# Patient Record
Sex: MALE | Race: WHITE | NOT HISPANIC OR LATINO
[De-identification: names, ages, dates, MRNs, and addresses within clinical notes are randomized per-mention and may not be internally consistent; named-entity substitution may affect disease eponyms.]

---

## 2017-02-13 ENCOUNTER — APPOINTMENT (OUTPATIENT)
Dept: ORTHOPEDIC SURGERY | Facility: CLINIC | Age: 52
End: 2017-02-13

## 2017-02-13 VITALS
SYSTOLIC BLOOD PRESSURE: 125 MMHG | WEIGHT: 190 LBS | DIASTOLIC BLOOD PRESSURE: 74 MMHG | BODY MASS INDEX: 25.73 KG/M2 | HEART RATE: 76 BPM | HEIGHT: 72 IN

## 2017-03-13 ENCOUNTER — APPOINTMENT (OUTPATIENT)
Dept: ORTHOPEDIC SURGERY | Facility: CLINIC | Age: 52
End: 2017-03-13

## 2017-07-10 ENCOUNTER — APPOINTMENT (OUTPATIENT)
Dept: ORTHOPEDIC SURGERY | Facility: CLINIC | Age: 52
End: 2017-07-10

## 2017-07-18 ENCOUNTER — FORM ENCOUNTER (OUTPATIENT)
Age: 52
End: 2017-07-18

## 2017-07-19 ENCOUNTER — APPOINTMENT (OUTPATIENT)
Dept: RADIOLOGY | Facility: HOSPITAL | Age: 52
End: 2017-07-19

## 2017-07-19 ENCOUNTER — OUTPATIENT (OUTPATIENT)
Dept: OUTPATIENT SERVICES | Facility: HOSPITAL | Age: 52
LOS: 1 days | End: 2017-07-19
Payer: COMMERCIAL

## 2017-07-19 DIAGNOSIS — M51.24 OTHER INTERVERTEBRAL DISC DISPLACEMENT, THORACIC REGION: ICD-10-CM

## 2017-07-19 DIAGNOSIS — M54.12 RADICULOPATHY, CERVICAL REGION: ICD-10-CM

## 2017-07-19 PROCEDURE — 62305 MYELOGRAPHY LUMBAR INJECTION: CPT

## 2017-07-19 PROCEDURE — 72129 CT CHEST SPINE W/DYE: CPT

## 2017-07-19 PROCEDURE — 72126 CT NECK SPINE W/DYE: CPT

## 2017-07-21 ENCOUNTER — TRANSCRIPTION ENCOUNTER (OUTPATIENT)
Age: 52
End: 2017-07-21

## 2017-07-24 ENCOUNTER — APPOINTMENT (OUTPATIENT)
Dept: ORTHOPEDIC SURGERY | Facility: CLINIC | Age: 52
End: 2017-07-24

## 2017-07-24 DIAGNOSIS — M51.24 OTHER INTERVERTEBRAL DISC DISPLACEMENT, THORACIC REGION: ICD-10-CM

## 2017-07-24 DIAGNOSIS — M50.20 OTHER CERVICAL DISC DISPLACEMENT, UNSPECIFIED CERVICAL REGION: ICD-10-CM

## 2017-11-06 ENCOUNTER — APPOINTMENT (OUTPATIENT)
Dept: ORTHOPEDIC SURGERY | Facility: CLINIC | Age: 52
End: 2017-11-06
Payer: COMMERCIAL

## 2017-11-06 DIAGNOSIS — M47.812 SPONDYLOSIS W/OUT MYELOPATHY OR RADICULOPATHY, CERVICAL REGION: ICD-10-CM

## 2017-11-06 DIAGNOSIS — M47.816 SPONDYLOSIS W/OUT MYELOPATHY OR RADICULOPATHY, CERVICAL REGION: ICD-10-CM

## 2017-11-06 PROCEDURE — 99214 OFFICE O/P EST MOD 30 MIN: CPT

## 2018-07-27 PROBLEM — M47.812 DEGENERATIVE JOINT DISEASE OF CERVICAL AND LUMBAR SPINE: Status: ACTIVE | Noted: 2017-02-13

## 2019-06-27 ENCOUNTER — APPOINTMENT (OUTPATIENT)
Dept: ORTHOPEDIC SURGERY | Facility: CLINIC | Age: 54
End: 2019-06-27
Payer: COMMERCIAL

## 2019-06-27 PROCEDURE — 99215 OFFICE O/P EST HI 40 MIN: CPT

## 2019-06-27 PROCEDURE — 72100 X-RAY EXAM L-S SPINE 2/3 VWS: CPT

## 2019-08-28 ENCOUNTER — APPOINTMENT (OUTPATIENT)
Dept: NEUROSURGERY | Facility: CLINIC | Age: 54
End: 2019-08-28
Payer: COMMERCIAL

## 2019-08-28 PROCEDURE — 99203 OFFICE O/P NEW LOW 30 MIN: CPT

## 2019-08-30 NOTE — CONSULT LETTER
[Dear  ___] : Dear  [unfilled], [Courtesy Letter:] : I had the pleasure of seeing your patient, [unfilled], in my office today. [Sincerely,] : Sincerely, [FreeTextEntry2] : John Francis\par [facility] [address]\[place] [address] [FreeTextEntry1] : Mr. Samm Marie is a 54-year-old male who has a long-standing history of chronic back pain. Patient was recently diagnosed with multiple myeloma in May of 2019. Patient states his back pain started shortly around this time after performing home improvements. Patient also states he is currently on immunotherapy medications. He has noticed that his pain becomes almost unbearable shortly after receiving his immunotherapy.  Patient reports a constant 10/10, at its worst, sharp and shooting lower back pain, right greater then left. He experiences mid back stiffness. Patient does not experience any radiating symptoms down his legs. He denies any numbness, tingling, or weakness to his legs. He denies any mechanical back pain. Patient does have difficulties with balance. He does state that pain worsens with activity. Lying down provides him with some relief. Cyclobenzaprine, tramadol, and gabapentin provide him with some relief.  Patient underwent epidural injections July 3, 2019 and July 24, 2019. He is not sure that this has helped his symptoms. Patient has also undergone a few sessions of acupuncture in August of 2019 with no relief noted.\par \par Patient has MRIs of the cervical, thoracic, and lumbar spine performed at [facility] on June 11, 2019 which indicate degenerative changes and disc bulges throughout the spine.\par \par Patient is alert. No distress noted. Strength of bilateral legs 5/5. Reflexes to bilateral lower extremities equal and normal. Sensation to light touch to bilateral legs symmetric and normal. Negative clonus. Patient is walking without difficulties.\par \par The patient's symptoms do not correlate with the MRI findings at this time. It is possible that his symptoms may be exacerbated due to the immunotherapy medications. I provided patient with a prescription for physical therapy for core strengthening exercises. Dr. Beverly was in to speak to the patient to discuss findings. Patient is aware to call our office with any new or worsening symptoms.\par  [FreeTextEntry3] : Jaqueline Bragg, MSN, FNP-BC\par Nurse Practitioner\par Department of Neurosurgery\par \par [facility]\par [address] [place] [address] [address],\[place] [address]\par \par Office: [phone]\par Fax: [phone]\par

## 2020-01-06 ENCOUNTER — RX RENEWAL (OUTPATIENT)
Age: 55
End: 2020-01-06

## 2020-01-22 ENCOUNTER — APPOINTMENT (OUTPATIENT)
Dept: NEUROSURGERY | Facility: CLINIC | Age: 55
End: 2020-01-22

## 2020-02-26 ENCOUNTER — APPOINTMENT (OUTPATIENT)
Dept: NEUROSURGERY | Facility: CLINIC | Age: 55
End: 2020-02-26

## 2020-05-21 ENCOUNTER — RX RENEWAL (OUTPATIENT)
Age: 55
End: 2020-05-21

## 2021-07-16 ENCOUNTER — TRANSCRIPTION ENCOUNTER (OUTPATIENT)
Age: 56
End: 2021-07-16

## 2021-07-17 ENCOUNTER — EMERGENCY (EMERGENCY)
Facility: HOSPITAL | Age: 56
LOS: 0 days | Discharge: HOME | End: 2021-07-17
Attending: STUDENT IN AN ORGANIZED HEALTH CARE EDUCATION/TRAINING PROGRAM
Payer: COMMERCIAL

## 2021-07-17 VITALS
WEIGHT: 192.02 LBS | OXYGEN SATURATION: 98 % | HEIGHT: 71 IN | HEART RATE: 62 BPM | DIASTOLIC BLOOD PRESSURE: 67 MMHG | RESPIRATION RATE: 14 BRPM | TEMPERATURE: 99 F | SYSTOLIC BLOOD PRESSURE: 118 MMHG

## 2021-07-17 VITALS
RESPIRATION RATE: 16 BRPM | DIASTOLIC BLOOD PRESSURE: 76 MMHG | HEART RATE: 60 BPM | SYSTOLIC BLOOD PRESSURE: 124 MMHG | OXYGEN SATURATION: 99 %

## 2021-07-17 DIAGNOSIS — R42 DIZZINESS AND GIDDINESS: ICD-10-CM

## 2021-07-17 DIAGNOSIS — R06.02 SHORTNESS OF BREATH: ICD-10-CM

## 2021-07-17 DIAGNOSIS — Z87.39 PERSONAL HISTORY OF OTHER DISEASES OF THE MUSCULOSKELETAL SYSTEM AND CONNECTIVE TISSUE: ICD-10-CM

## 2021-07-17 DIAGNOSIS — Z87.891 PERSONAL HISTORY OF NICOTINE DEPENDENCE: ICD-10-CM

## 2021-07-17 DIAGNOSIS — R07.89 OTHER CHEST PAIN: ICD-10-CM

## 2021-07-17 DIAGNOSIS — R06.00 DYSPNEA, UNSPECIFIED: ICD-10-CM

## 2021-07-17 LAB
ALBUMIN SERPL ELPH-MCNC: 4 G/DL — SIGNIFICANT CHANGE UP (ref 3.3–5)
ALP SERPL-CCNC: 83 U/L — SIGNIFICANT CHANGE UP (ref 40–120)
ALT FLD-CCNC: 32 U/L — SIGNIFICANT CHANGE UP (ref 12–78)
ANION GAP SERPL CALC-SCNC: 6 MMOL/L — SIGNIFICANT CHANGE UP (ref 5–17)
AST SERPL-CCNC: 29 U/L — SIGNIFICANT CHANGE UP (ref 15–37)
BASOPHILS # BLD AUTO: 0 K/UL — SIGNIFICANT CHANGE UP (ref 0–0.2)
BASOPHILS NFR BLD AUTO: 0 % — SIGNIFICANT CHANGE UP (ref 0–2)
BILIRUB SERPL-MCNC: 1.4 MG/DL — HIGH (ref 0.2–1.2)
BUN SERPL-MCNC: 15 MG/DL — SIGNIFICANT CHANGE UP (ref 7–23)
CALCIUM SERPL-MCNC: 8.7 MG/DL — SIGNIFICANT CHANGE UP (ref 8.5–10.1)
CHLORIDE SERPL-SCNC: 108 MMOL/L — SIGNIFICANT CHANGE UP (ref 96–108)
CO2 SERPL-SCNC: 27 MMOL/L — SIGNIFICANT CHANGE UP (ref 22–31)
CREAT SERPL-MCNC: 0.92 MG/DL — SIGNIFICANT CHANGE UP (ref 0.5–1.3)
D DIMER BLD IA.RAPID-MCNC: <150 NG/ML DDU — SIGNIFICANT CHANGE UP
EOSINOPHIL # BLD AUTO: 0 K/UL — SIGNIFICANT CHANGE UP (ref 0–0.5)
EOSINOPHIL NFR BLD AUTO: 0 % — SIGNIFICANT CHANGE UP (ref 0–6)
GLUCOSE SERPL-MCNC: 92 MG/DL — SIGNIFICANT CHANGE UP (ref 70–99)
HCT VFR BLD CALC: 40.2 % — SIGNIFICANT CHANGE UP (ref 39–50)
HGB BLD-MCNC: 13.5 G/DL — SIGNIFICANT CHANGE UP (ref 13–17)
IMM GRANULOCYTES NFR BLD AUTO: 0.4 % — SIGNIFICANT CHANGE UP (ref 0–1.5)
LYMPHOCYTES # BLD AUTO: 0.64 K/UL — LOW (ref 1–3.3)
LYMPHOCYTES # BLD AUTO: 12.2 % — LOW (ref 13–44)
MAGNESIUM SERPL-MCNC: 2.4 MG/DL — SIGNIFICANT CHANGE UP (ref 1.6–2.6)
MCHC RBC-ENTMCNC: 33.6 GM/DL — SIGNIFICANT CHANGE UP (ref 32–36)
MCHC RBC-ENTMCNC: 35.2 PG — HIGH (ref 27–34)
MCV RBC AUTO: 104.7 FL — HIGH (ref 80–100)
MONOCYTES # BLD AUTO: 0.87 K/UL — SIGNIFICANT CHANGE UP (ref 0–0.9)
MONOCYTES NFR BLD AUTO: 16.6 % — HIGH (ref 2–14)
NEUTROPHILS # BLD AUTO: 3.71 K/UL — SIGNIFICANT CHANGE UP (ref 1.8–7.4)
NEUTROPHILS NFR BLD AUTO: 70.8 % — SIGNIFICANT CHANGE UP (ref 43–77)
NT-PROBNP SERPL-SCNC: 69 PG/ML — SIGNIFICANT CHANGE UP (ref 0–125)
PLATELET # BLD AUTO: 116 K/UL — LOW (ref 150–400)
POTASSIUM SERPL-MCNC: 4.5 MMOL/L — SIGNIFICANT CHANGE UP (ref 3.5–5.3)
POTASSIUM SERPL-SCNC: 4.5 MMOL/L — SIGNIFICANT CHANGE UP (ref 3.5–5.3)
PROT SERPL-MCNC: 6.4 GM/DL — SIGNIFICANT CHANGE UP (ref 6–8.3)
RBC # BLD: 3.84 M/UL — LOW (ref 4.2–5.8)
RBC # FLD: 12 % — SIGNIFICANT CHANGE UP (ref 10.3–14.5)
SARS-COV-2 RNA SPEC QL NAA+PROBE: SIGNIFICANT CHANGE UP
SODIUM SERPL-SCNC: 141 MMOL/L — SIGNIFICANT CHANGE UP (ref 135–145)
TROPONIN I SERPL-MCNC: <0.015 NG/ML — SIGNIFICANT CHANGE UP (ref 0.01–0.04)
WBC # BLD: 5.24 K/UL — SIGNIFICANT CHANGE UP (ref 3.8–10.5)
WBC # FLD AUTO: 5.24 K/UL — SIGNIFICANT CHANGE UP (ref 3.8–10.5)

## 2021-07-17 PROCEDURE — 83735 ASSAY OF MAGNESIUM: CPT

## 2021-07-17 PROCEDURE — 93010 ELECTROCARDIOGRAM REPORT: CPT

## 2021-07-17 PROCEDURE — 36415 COLL VENOUS BLD VENIPUNCTURE: CPT

## 2021-07-17 PROCEDURE — U0005: CPT

## 2021-07-17 PROCEDURE — 71275 CT ANGIOGRAPHY CHEST: CPT | Mod: 26,MA

## 2021-07-17 PROCEDURE — U0003: CPT

## 2021-07-17 PROCEDURE — 71275 CT ANGIOGRAPHY CHEST: CPT

## 2021-07-17 PROCEDURE — 93970 EXTREMITY STUDY: CPT

## 2021-07-17 PROCEDURE — 99285 EMERGENCY DEPT VISIT HI MDM: CPT

## 2021-07-17 PROCEDURE — 93005 ELECTROCARDIOGRAM TRACING: CPT

## 2021-07-17 PROCEDURE — 99284 EMERGENCY DEPT VISIT MOD MDM: CPT | Mod: 25

## 2021-07-17 PROCEDURE — 80053 COMPREHEN METABOLIC PANEL: CPT

## 2021-07-17 PROCEDURE — 85025 COMPLETE CBC W/AUTO DIFF WBC: CPT

## 2021-07-17 PROCEDURE — 85379 FIBRIN DEGRADATION QUANT: CPT

## 2021-07-17 PROCEDURE — 71045 X-RAY EXAM CHEST 1 VIEW: CPT | Mod: 26

## 2021-07-17 PROCEDURE — 71045 X-RAY EXAM CHEST 1 VIEW: CPT

## 2021-07-17 PROCEDURE — 93970 EXTREMITY STUDY: CPT | Mod: 26

## 2021-07-17 PROCEDURE — 84484 ASSAY OF TROPONIN QUANT: CPT

## 2021-07-17 PROCEDURE — 83880 ASSAY OF NATRIURETIC PEPTIDE: CPT

## 2021-07-17 NOTE — ED PROVIDER NOTE - NS ED ROS FT
Constitutional: No fever.  Neurological: No headache.  Eyes: No vision changes.   Ears, Nose, Mouth, Throat: No congestion.  Cardiovascular: (+) chest tightness/ heaviness   Respiratory: (+) SOB   Gastrointestinal: No nausea or vomiting.  Genitourinary: No dysuria.  Musculoskeletal: No joint pain.  Integumentary (skin and/or breast): No rash.

## 2021-07-17 NOTE — ED PROVIDER NOTE - PHYSICAL EXAMINATION
Vital signs as available reviewed.  General:  Comfortable, no acute distress.  Head:  Normocephalic, atraumatic.  Eyes:  Conjunctiva pink, no icterus.  Cardiovascular:  Regular rate, (+) soft systolic murmur   Respiratory:  Clear to auscultation, good air entry bilaterally.  Abdomen:  Soft, non-tender.  Musculoskeletal:  No deformity or calf tenderness.  Neurologic: Alert and oriented, moving all extremities.  Skin:  Warm and dry.

## 2021-07-17 NOTE — ED ADULT TRIAGE NOTE - CHIEF COMPLAINT QUOTE
Pt comes to the ED complaining of shortness of breath, intermittent lightheadedness, near syncope. Pt was seen at [facility] yesterday and was recommended to come to the ED to r/o PE and be evaluated for MI. Pt refused at the time. Pt comes to the ED today for evaluation.

## 2021-07-17 NOTE — ED PROVIDER NOTE - CLINICAL SUMMARY MEDICAL DECISION MAKING FREE TEXT BOX
here with SOB and chest pressure. and lightheaded. former smoker. hx of cancer. r/o acs and pe. CTA chest. check labs. reassess.

## 2021-07-17 NOTE — ED ADULT NURSE NOTE - NS_ED_NURSE_TEACHING_TOPIC_ED_A_ED
Pt. DC as ordered. No acute or physical distress noted at this time - Pt. and family verbalizes understanding of DC, FU and Worsening of symptoms to return to ED/Respiratory

## 2021-07-17 NOTE — ED PROVIDER NOTE - NSFOLLOWUPINSTRUCTIONS_ED_ALL_ED_FT
Please follow up with your Primary Care Physician / cardiologist as soon as possible.  Please take your medications as prescribed.  If your symptoms persist or worsen, please seek care. Either return to the Emergency Department, go to urgent care or see your primary care doctor.   ====================================================================     Shortness of Breath    WHAT YOU NEED TO KNOW:    Shortness of breath is a feeling that you cannot get enough air when you breathe in. You may have this feeling only during activity, or all the time. Your symptoms can range from mild to severe. Shortness of breath may be a sign of a serious health condition that needs immediate care.    DISCHARGE INSTRUCTIONS:    Return to the emergency department if:     Your signs and symptoms are the same or worse within 24 hours of treatment.       The skin over your ribs or on your neck sinks in when you breathe.       You feel confused or dizzy.    Contact your healthcare provider if:     You have new or worsening symptoms.      You have questions or concerns about your condition or care.    Medicines:     Medicines may be used to treat the cause of your symptoms. You may need medicine to treat a bacterial infection or reduce anxiety. Other medicines may be used to open your airway, reduce swelling, or remove extra fluid. If you have a heart condition, you may need medicine to help your heart beat more strongly or regularly.      Take your medicine as directed. Contact your healthcare provider if you think your medicine is not helping or if you have side effects. Tell him or her if you are allergic to any medicine. Keep a list of the medicines, vitamins, and herbs you take. Include the amounts, and when and why you take them. Bring the list or the pill bottles to follow-up visits. Carry your medicine list with you in case of an emergency.    Manage shortness of breath:     Create an action plan. You and your healthcare provider can work together to create a plan for how to handle shortness of breath. The plan can include daily activities, treatment changes, and what to do if you have severe breathing problems.      Lean forward on your elbows when you sit. This helps your lungs expand and may make it easier to breathe.      Use pursed-lip breathing any time you feel short of breath. Breathe in through your nose and then slowly breathe out through your mouth with your lips slightly puckered. It should take you twice as long to breathe out as it did to breathe in.Breathe in Breathe out           Do not smoke. Nicotine and other chemicals in cigarettes and cigars can cause lung damage and make shortness of breath worse. Ask your healthcare provider for information if you currently smoke and need help to quit. E-cigarettes or smokeless tobacco still contain nicotine. Talk to your healthcare provider before you use these products.      Reach or maintain a healthy weight. Your healthcare provider can help you create a safe weight loss plan if you are overweight.      Exercise as directed. Exercise can help your lungs work more easily. Exercise can also help you lose weight if needed. Try to get at least 30 minutes of exercise most days of the week.    Follow up with your healthcare provider or specialist as directed: Write down your questions so you remember to ask them during your visits.

## 2021-07-17 NOTE — ED ADULT NURSE NOTE - CHIEF COMPLAINT QUOTE
Pt comes to the ED complaining of shortness of breath, intermittent lightheadedness, near syncope. Pt was seen at [facility] yesterday and was recommended to come to the ED to r/o PE and be evaluated for MI. Pt refused at the time. Pt comes to the ED today for evaluation.

## 2021-07-17 NOTE — ED PROVIDER NOTE - PROGRESS NOTE DETAILS
KV: patient offered admission for further work up of his symptoms, he refuses, states he will follow up with his outpatient team. plan for discharge if work up negative. Clifford

## 2021-07-17 NOTE — ED PROVIDER NOTE - PATIENT PORTAL LINK FT
You can access the FollowMyHealth Patient Portal offered by [facility] by registering at the following website: [email or web address]. By joining [facility]’s FollowMyHealth portal, you will also be able to view your health information using other applications (apps) compatible with our system.

## 2021-07-17 NOTE — ED PROVIDER NOTE - CARE PLAN
Principal Discharge DX:	Dyspnea  Secondary Diagnosis:	Dizziness  Secondary Diagnosis:	Chest pain  
no

## 2021-07-17 NOTE — ED ADULT NURSE REASSESSMENT NOTE - NS ED NURSE REASSESS COMMENT FT1
Menu provided and lunch ordered for pt. Pt resting comfortably with call bell within reach.
Plan of care reviewed with pt and girlfriend at bedside. MD made aware of pt wanting to be discharged.
Pt returned from CT scan. Pillow and call bell provided for comfort. Plan of care reviewed with pt and girlfriend at bedside.

## 2021-07-17 NOTE — ED PROVIDER NOTE - OBJECTIVE STATEMENT
55 y/o M with a PMHx of disc degeneration, cervical radiculopathy, mitral valve regurgitation, myeloma presents to the ED ambulatory c/o intermittent SOB x couple days. Reports waking up this morning feeling very SOB. Notes exertion worsens SOB, but also pt reports SOB will have a sudden onset even when he is at rest. Pt also states he feels lightheaded and has chest tightness/heaviness; no CP. Reports he "feels like I will faint" but denies any syncopal episodes. Reports shin/feet pain. Went to an urgent care to receive a COVID test yesterday, and was suggested to come in to the ED to r/o PE. Takes 1 ASA per day. Denies hx of MI. Former smoker, reports at he used to spoke 1.5 ppd. Drinks socially. No drug use. PCP: Dr. John Francis. Cardio: Dr. Rahul Alvarenga. Oncologist: Dr. Mian Chapin

## 2021-07-18 ENCOUNTER — TRANSCRIPTION ENCOUNTER (OUTPATIENT)
Age: 56
End: 2021-07-18

## 2022-02-06 ENCOUNTER — INPATIENT (INPATIENT)
Facility: HOSPITAL | Age: 57
LOS: 2 days | Discharge: HOME | DRG: 194 | End: 2022-02-09
Attending: INTERNAL MEDICINE | Admitting: INTERNAL MEDICINE
Payer: COMMERCIAL

## 2022-02-06 VITALS — WEIGHT: 188.94 LBS | HEIGHT: 71 IN

## 2022-02-06 DIAGNOSIS — J18.9 PNEUMONIA, UNSPECIFIED ORGANISM: ICD-10-CM

## 2022-02-06 PROBLEM — C90.00 MULTIPLE MYELOMA NOT HAVING ACHIEVED REMISSION: Chronic | Status: ACTIVE | Noted: 2021-07-17

## 2022-02-06 PROBLEM — I34.0 NONRHEUMATIC MITRAL (VALVE) INSUFFICIENCY: Chronic | Status: ACTIVE | Noted: 2021-07-17

## 2022-02-06 LAB
ADD ON TEST-SPECIMEN IN LAB: SIGNIFICANT CHANGE UP
ALBUMIN SERPL ELPH-MCNC: 3.1 G/DL — LOW (ref 3.3–5)
ALP SERPL-CCNC: 93 U/L — SIGNIFICANT CHANGE UP (ref 40–120)
ALT FLD-CCNC: 24 U/L — SIGNIFICANT CHANGE UP (ref 12–78)
ANION GAP SERPL CALC-SCNC: 5 MMOL/L — SIGNIFICANT CHANGE UP (ref 5–17)
APTT BLD: 30.8 SEC — SIGNIFICANT CHANGE UP (ref 27.5–35.5)
AST SERPL-CCNC: 14 U/L — LOW (ref 15–37)
BASOPHILS # BLD AUTO: 0.01 K/UL — SIGNIFICANT CHANGE UP (ref 0–0.2)
BASOPHILS NFR BLD AUTO: 0.1 % — SIGNIFICANT CHANGE UP (ref 0–2)
BILIRUB SERPL-MCNC: 1.7 MG/DL — HIGH (ref 0.2–1.2)
BUN SERPL-MCNC: 15 MG/DL — SIGNIFICANT CHANGE UP (ref 7–23)
CALCIUM SERPL-MCNC: 8.9 MG/DL — SIGNIFICANT CHANGE UP (ref 8.5–10.1)
CHLORIDE SERPL-SCNC: 108 MMOL/L — SIGNIFICANT CHANGE UP (ref 96–108)
CO2 SERPL-SCNC: 27 MMOL/L — SIGNIFICANT CHANGE UP (ref 22–31)
CREAT SERPL-MCNC: 1.06 MG/DL — SIGNIFICANT CHANGE UP (ref 0.5–1.3)
D DIMER BLD IA.RAPID-MCNC: 681 NG/ML DDU — HIGH
EOSINOPHIL # BLD AUTO: 0.05 K/UL — SIGNIFICANT CHANGE UP (ref 0–0.5)
EOSINOPHIL NFR BLD AUTO: 0.5 % — SIGNIFICANT CHANGE UP (ref 0–6)
GLUCOSE SERPL-MCNC: 104 MG/DL — HIGH (ref 70–99)
HCT VFR BLD CALC: 39.6 % — SIGNIFICANT CHANGE UP (ref 39–50)
HGB BLD-MCNC: 13.2 G/DL — SIGNIFICANT CHANGE UP (ref 13–17)
IMM GRANULOCYTES NFR BLD AUTO: 0.2 % — SIGNIFICANT CHANGE UP (ref 0–1.5)
INR BLD: 1.19 RATIO — HIGH (ref 0.88–1.16)
LACTATE SERPL-SCNC: 1.1 MMOL/L — SIGNIFICANT CHANGE UP (ref 0.7–2)
LYMPHOCYTES # BLD AUTO: 0.9 K/UL — LOW (ref 1–3.3)
LYMPHOCYTES # BLD AUTO: 9.4 % — LOW (ref 13–44)
MAGNESIUM SERPL-MCNC: 2.4 MG/DL — SIGNIFICANT CHANGE UP (ref 1.6–2.6)
MCHC RBC-ENTMCNC: 33.2 PG — SIGNIFICANT CHANGE UP (ref 27–34)
MCHC RBC-ENTMCNC: 33.3 GM/DL — SIGNIFICANT CHANGE UP (ref 32–36)
MCV RBC AUTO: 99.7 FL — SIGNIFICANT CHANGE UP (ref 80–100)
MONOCYTES # BLD AUTO: 1.12 K/UL — HIGH (ref 0–0.9)
MONOCYTES NFR BLD AUTO: 11.6 % — SIGNIFICANT CHANGE UP (ref 2–14)
NEUTROPHILS # BLD AUTO: 7.52 K/UL — HIGH (ref 1.8–7.4)
NEUTROPHILS NFR BLD AUTO: 78.2 % — HIGH (ref 43–77)
NT-PROBNP SERPL-SCNC: 751 PG/ML — HIGH (ref 0–125)
PLATELET # BLD AUTO: 148 K/UL — LOW (ref 150–400)
POTASSIUM SERPL-MCNC: 4.1 MMOL/L — SIGNIFICANT CHANGE UP (ref 3.5–5.3)
POTASSIUM SERPL-SCNC: 4.1 MMOL/L — SIGNIFICANT CHANGE UP (ref 3.5–5.3)
PROT SERPL-MCNC: 6.7 GM/DL — SIGNIFICANT CHANGE UP (ref 6–8.3)
PROTHROM AB SERPL-ACNC: 13.8 SEC — HIGH (ref 10.6–13.6)
RBC # BLD: 3.97 M/UL — LOW (ref 4.2–5.8)
RBC # FLD: 12.1 % — SIGNIFICANT CHANGE UP (ref 10.3–14.5)
SODIUM SERPL-SCNC: 140 MMOL/L — SIGNIFICANT CHANGE UP (ref 135–145)
TROPONIN I, HIGH SENSITIVITY RESULT: 5.9 NG/L — SIGNIFICANT CHANGE UP
TROPONIN I, HIGH SENSITIVITY RESULT: 6.27 NG/L — SIGNIFICANT CHANGE UP
WBC # BLD: 9.62 K/UL — SIGNIFICANT CHANGE UP (ref 3.8–10.5)
WBC # FLD AUTO: 9.62 K/UL — SIGNIFICANT CHANGE UP (ref 3.8–10.5)

## 2022-02-06 PROCEDURE — 87040 BLOOD CULTURE FOR BACTERIA: CPT

## 2022-02-06 PROCEDURE — 86803 HEPATITIS C AB TEST: CPT

## 2022-02-06 PROCEDURE — 36415 COLL VENOUS BLD VENIPUNCTURE: CPT

## 2022-02-06 PROCEDURE — 72148 MRI LUMBAR SPINE W/O DYE: CPT

## 2022-02-06 PROCEDURE — 80048 BASIC METABOLIC PNL TOTAL CA: CPT

## 2022-02-06 PROCEDURE — 86738 MYCOPLASMA ANTIBODY: CPT

## 2022-02-06 PROCEDURE — 85027 COMPLETE CBC AUTOMATED: CPT

## 2022-02-06 PROCEDURE — 99285 EMERGENCY DEPT VISIT HI MDM: CPT

## 2022-02-06 PROCEDURE — 83880 ASSAY OF NATRIURETIC PEPTIDE: CPT

## 2022-02-06 PROCEDURE — 71275 CT ANGIOGRAPHY CHEST: CPT | Mod: 26,MA

## 2022-02-06 PROCEDURE — 71045 X-RAY EXAM CHEST 1 VIEW: CPT | Mod: 26

## 2022-02-06 PROCEDURE — 83605 ASSAY OF LACTIC ACID: CPT

## 2022-02-06 PROCEDURE — 99223 1ST HOSP IP/OBS HIGH 75: CPT

## 2022-02-06 PROCEDURE — 93010 ELECTROCARDIOGRAM REPORT: CPT

## 2022-02-06 RX ORDER — ALPRAZOLAM 0.25 MG
0.5 TABLET ORAL AT BEDTIME
Refills: 0 | Status: DISCONTINUED | OUTPATIENT
Start: 2022-02-06 | End: 2022-02-09

## 2022-02-06 RX ORDER — AZITHROMYCIN 500 MG/1
500 TABLET, FILM COATED ORAL EVERY 24 HOURS
Refills: 0 | Status: DISCONTINUED | OUTPATIENT
Start: 2022-02-07 | End: 2022-02-09

## 2022-02-06 RX ORDER — ACETAMINOPHEN 500 MG
975 TABLET ORAL ONCE
Refills: 0 | Status: COMPLETED | OUTPATIENT
Start: 2022-02-06 | End: 2022-02-06

## 2022-02-06 RX ORDER — ACYCLOVIR SODIUM 500 MG
400 VIAL (EA) INTRAVENOUS DAILY
Refills: 0 | Status: DISCONTINUED | OUTPATIENT
Start: 2022-02-06 | End: 2022-02-09

## 2022-02-06 RX ORDER — ACETAMINOPHEN 500 MG
650 TABLET ORAL EVERY 6 HOURS
Refills: 0 | Status: DISCONTINUED | OUTPATIENT
Start: 2022-02-06 | End: 2022-02-09

## 2022-02-06 RX ORDER — ASPIRIN/CALCIUM CARB/MAGNESIUM 324 MG
81 TABLET ORAL ONCE
Refills: 0 | Status: COMPLETED | OUTPATIENT
Start: 2022-02-06 | End: 2022-02-06

## 2022-02-06 RX ORDER — MUPIROCIN 20 MG/G
1 OINTMENT TOPICAL ONCE
Refills: 0 | Status: DISCONTINUED | OUTPATIENT
Start: 2022-02-06 | End: 2022-02-06

## 2022-02-06 RX ORDER — ENOXAPARIN SODIUM 100 MG/ML
40 INJECTION SUBCUTANEOUS DAILY
Refills: 0 | Status: DISCONTINUED | OUTPATIENT
Start: 2022-02-06 | End: 2022-02-09

## 2022-02-06 RX ORDER — ONDANSETRON 8 MG/1
4 TABLET, FILM COATED ORAL EVERY 8 HOURS
Refills: 0 | Status: DISCONTINUED | OUTPATIENT
Start: 2022-02-06 | End: 2022-02-09

## 2022-02-06 RX ORDER — CEFTRIAXONE 500 MG/1
1000 INJECTION, POWDER, FOR SOLUTION INTRAMUSCULAR; INTRAVENOUS ONCE
Refills: 0 | Status: COMPLETED | OUTPATIENT
Start: 2022-02-06 | End: 2022-02-06

## 2022-02-06 RX ORDER — ATORVASTATIN CALCIUM 80 MG/1
40 TABLET, FILM COATED ORAL AT BEDTIME
Refills: 0 | Status: DISCONTINUED | OUTPATIENT
Start: 2022-02-06 | End: 2022-02-09

## 2022-02-06 RX ORDER — LANOLIN ALCOHOL/MO/W.PET/CERES
3 CREAM (GRAM) TOPICAL AT BEDTIME
Refills: 0 | Status: DISCONTINUED | OUTPATIENT
Start: 2022-02-06 | End: 2022-02-09

## 2022-02-06 RX ORDER — CEFTRIAXONE 500 MG/1
1000 INJECTION, POWDER, FOR SOLUTION INTRAMUSCULAR; INTRAVENOUS EVERY 24 HOURS
Refills: 0 | Status: DISCONTINUED | OUTPATIENT
Start: 2022-02-07 | End: 2022-02-09

## 2022-02-06 RX ORDER — AZITHROMYCIN 500 MG/1
500 TABLET, FILM COATED ORAL ONCE
Refills: 0 | Status: COMPLETED | OUTPATIENT
Start: 2022-02-06 | End: 2022-02-06

## 2022-02-06 RX ORDER — SODIUM CHLORIDE 9 MG/ML
500 INJECTION INTRAMUSCULAR; INTRAVENOUS; SUBCUTANEOUS ONCE
Refills: 0 | Status: COMPLETED | OUTPATIENT
Start: 2022-02-06 | End: 2022-02-06

## 2022-02-06 RX ORDER — OXYCODONE HYDROCHLORIDE 5 MG/1
2.5 TABLET ORAL EVERY 6 HOURS
Refills: 0 | Status: DISCONTINUED | OUTPATIENT
Start: 2022-02-06 | End: 2022-02-09

## 2022-02-06 RX ORDER — ESCITALOPRAM OXALATE 10 MG/1
10 TABLET, FILM COATED ORAL DAILY
Refills: 0 | Status: DISCONTINUED | OUTPATIENT
Start: 2022-02-06 | End: 2022-02-09

## 2022-02-06 RX ORDER — MUPIROCIN 20 MG/G
1 OINTMENT TOPICAL
Refills: 0 | Status: DISCONTINUED | OUTPATIENT
Start: 2022-02-06 | End: 2022-02-09

## 2022-02-06 RX ADMIN — ATORVASTATIN CALCIUM 40 MILLIGRAM(S): 80 TABLET, FILM COATED ORAL at 21:08

## 2022-02-06 RX ADMIN — Medication 0.5 MILLIGRAM(S): at 21:07

## 2022-02-06 RX ADMIN — ESCITALOPRAM OXALATE 10 MILLIGRAM(S): 10 TABLET, FILM COATED ORAL at 17:10

## 2022-02-06 RX ADMIN — AZITHROMYCIN 255 MILLIGRAM(S): 500 TABLET, FILM COATED ORAL at 11:31

## 2022-02-06 RX ADMIN — Medication 400 MILLIGRAM(S): at 17:10

## 2022-02-06 RX ADMIN — Medication 975 MILLIGRAM(S): at 09:00

## 2022-02-06 RX ADMIN — CEFTRIAXONE 100 MILLIGRAM(S): 500 INJECTION, POWDER, FOR SOLUTION INTRAMUSCULAR; INTRAVENOUS at 11:33

## 2022-02-06 RX ADMIN — SODIUM CHLORIDE 500 MILLILITER(S): 9 INJECTION INTRAMUSCULAR; INTRAVENOUS; SUBCUTANEOUS at 11:31

## 2022-02-06 RX ADMIN — MUPIROCIN 1 APPLICATION(S): 20 OINTMENT TOPICAL at 18:17

## 2022-02-06 RX ADMIN — Medication 81 MILLIGRAM(S): at 09:00

## 2022-02-06 RX ADMIN — ENOXAPARIN SODIUM 40 MILLIGRAM(S): 100 INJECTION SUBCUTANEOUS at 17:10

## 2022-02-06 NOTE — H&P ADULT - NSHPPHYSICALEXAM_GEN_ALL_CORE
heent nc at perrla  neck supple no jvd  chest CTA  cvs s1s2 reg no m/g/r  abd soft nt nd + bs  extr no e/c/c  + right RSL  neuro non focal  skin no rashes

## 2022-02-06 NOTE — ED PROVIDER NOTE - ABNORMAL RHYTHM
Dialectical Behavioral therapy , interpersonal therapy, supportive therapy and milleu therapy provided sinus tachycardia

## 2022-02-06 NOTE — ED PROVIDER NOTE - NS ED ROS FT
GENERAL: + low grade fever, chills, fatigue, weight loss, night sweats  HEENT: no eye pain, discharge, conjunctivitis, ear pain, hearing loss, rhinorrhea, congestion, throat pain  CARDIAC: no chest pain, +chest discomfort, palpitations, lightheadedness, syncope  PULM: no dyspnea, wheezing, +cough, +SOB  GI: no abdominal pain, nausea, vomiting, diarrhea, constipation, melena, hematochezia  : no urinary dysuria, frequency, incontinence, hematuria  NEURO: no headache, changes in vision, motor weakness, sensory changes  MSK: no joint pain, joint swelling, myalgias  SKIN: no rashes  HEME: no active bleeding, excessive bruising

## 2022-02-06 NOTE — H&P ADULT - HISTORY OF PRESENT ILLNESS
58 y/o male with a PMHx of  myeloma not on immunotherapy , MR, quadruple vaccinated ( last vaccinated  a week ago ) presents to the ED c/o low grade temp x4 days. Associated SOB, cough , left sided chest discomfort. Reports he feels like pain is worsened with deep breaths. Describes pain as sharp. Had Mitral valve repair and Tricuspid Valve repair few months ago ( Nov 2021). Tm was 101. Pt also describes back pain with right leg irradiation for 5 weeks; in view of his h/o MM I will order MRI lumbar spine. Admit for PNA, immunocompromised. He is considered in remission and will have to me on maintenance Rx until 2024, treatment not started yet.

## 2022-02-06 NOTE — ED ADULT NURSE NOTE - OBJECTIVE STATEMENT
4th dose on January 31, h/o OHS for MV and TV replacement.  h/o multiple myeloma May 2019, currently in remission.  Patient states he felt crackling sound on LLL with intermittent fever T max 101 two days ago.  Sx started four days ago.  He admits to night sweats.  Feels discomfort and shortness of breath on his left side worse with lying down.

## 2022-02-06 NOTE — H&P ADULT - NSHPLABSRESULTS_GEN_ALL_CORE
13.2   9.62  )-----------( 148      ( 06 Feb 2022 08:47 )             39.6   02-06    140  |  108  |  15  ----------------------------<  104<H>  4.1   |  27  |  1.06    Ca    8.9      06 Feb 2022 08:47  Mg     2.4     02-06    TPro  6.7  /  Alb  3.1<L>  /  TBili  1.7<H>  /  DBili  x   /  AST  14<L>  /  ALT  24  /  AlkPhos  93  02-06      < from: CT Angio Chest PE Protocol w/ IV Cont (02.06.22 @ 10:10) >    Small left pleural effusion and trace right pleural effusion, with   subsegmental bibasilar compressive atelectatic changes. Mild patchy   groundglass opacities are seen in the bilateral lower lobes and right   middle lobe, with a bibasilar predominance. Mild bronchial wall   thickening. Findings are nonspecific; differential includes multifocal   pneumonia versus pulmonary vascular congestion. Recommend clinical   correlation and follow-up imaging to document resolution.    Small pericardial effusion, new compared to the prior study from   7/17/2021, presumably related to recent tricuspid and mitral valve   surgery.    Mildly enlarged main pulmonary artery, correlate for pulmonary artery   hypertension.      1.4 cm hypodensity at the upper pole   of the left kidney, which is not characterized on the current exam but   probably represents a cyst. Incidental note is made of the common hepatic   artery replaced to the aorta, variant anatomy.

## 2022-02-06 NOTE — ED PROVIDER NOTE - OBJECTIVE STATEMENT
56 y/o male with a PMHx of  myeloma not on immunotherapy , MR, quadruple vaccinated ( last vaxed a week ago ) presents to the ED c/o low grade temp x4 days. Associated SOB, cough , left sided chest discomfort. Reports he feels like pain is worsened with deep breaths. Describes pain as sharp. Had Mitral valve repair and Tricuspid Valve repair few months ago. No recent immobilization, leg swelling, abd pain, n/v. Non-smoker.  Taking Tylenol. No hx of MI.

## 2022-02-06 NOTE — ED ADULT NURSE REASSESSMENT NOTE - COMFORT CARE
menu provided/plan of care explained/po fluids offered
plan of care explained
ambulated to bathroom/plan of care explained

## 2022-02-06 NOTE — ED PROVIDER NOTE - PHYSICAL EXAMINATION
GENERAL: non-toxic appearing, in NAD  HEAD: atraumatic, normocephalic  EYES: vision grossly intact, no conjunctivitis or discharge  EARS: hearing grossly intact  NOSE: no nasal discharge, epistaxis   CARDIAC: Regular, tachycardic, normal S1S2,  no appreciable murmurs, no cyanosis, cap refill < 2 seconds  PULM: no respiratory distress, oxygen saturation on RA wnl, +crackles at bases, rales, rhonchi, or wheezing  GI: abdomen nondistended, soft, nontender, no guarding or rebound tenderness, no palpable masses  NEURO: awake and alert, follows commands, normal speech, PERRLA, EOMI, no focal motor or sensory deficits, normal gait  MSK: spine appears normal, no joint swelling or erythema, no gross deformities of extremities  EXT: no peripheral edema, calf tenderness, redness or swelling  SKIN: warm, dry, and intact, no rashes  PSYCH: appropriate mood and affect

## 2022-02-06 NOTE — ED ADULT TRIAGE NOTE - CHIEF COMPLAINT QUOTE
Pt c/o flu like symptoms for about 4 days. fever of 101 Friday. Last took Tylenol last night. SOB, and "crackles" when he breathes. S/P open heart surgery two months ago

## 2022-02-06 NOTE — ED PROVIDER NOTE - PROGRESS NOTE DETAILS
Miguel Castillo for attending Dr. Anderson: elevate d-dimer, will obtain CTA chest. Rigoberto MALDONADO: Received s/o from Dr. Anderson pending CTA chest- CTA chest with concern for multifocal pna vs. PVC; in setting of patient with tmax: 101, rigors, cough, sob- will treat for infectious source; IV antibiotics given; full 30cc/kg held 2/2 to concern for PVC; spoke with wife on phone regarding CT results and plan of care. Endorsed to Dr. Harper for admission.

## 2022-02-06 NOTE — ED PROVIDER NOTE - CLINICAL SUMMARY MEDICAL DECISION MAKING FREE TEXT BOX
58 y/o male with a PMHx of  myeloma not on immunotherapy , MR, quadruple vaccinated ( last vaxed a week ago ) presents to the ED c/o low grade temp x4 day. On exam, well appearing, normal VS, +crackles at bases, rales, rhonchi, or wheezing. Given immunosuppression, c/f PNA. Will check labs, CXR, disposition pending work-up and response to treatment.

## 2022-02-06 NOTE — PATIENT PROFILE ADULT - FALL HARM RISK - UNIVERSAL INTERVENTIONS
Bed in lowest position, wheels locked, appropriate side rails in place/Call bell, personal items and telephone in reach/Instruct patient to call for assistance before getting out of bed or chair/Non-slip footwear when patient is out of bed/[place] to call system/Physically safe environment - no spills, clutter or unnecessary equipment/Purposeful Proactive Rounding/Room/bathroom lighting operational, light cord in reach

## 2022-02-07 LAB
HCV AB S/CO SERPL IA: 0.1 S/CO — SIGNIFICANT CHANGE UP (ref 0–0.99)
HCV AB SERPL-IMP: SIGNIFICANT CHANGE UP

## 2022-02-07 PROCEDURE — 72148 MRI LUMBAR SPINE W/O DYE: CPT | Mod: 26

## 2022-02-07 PROCEDURE — 99232 SBSQ HOSP IP/OBS MODERATE 35: CPT

## 2022-02-07 RX ADMIN — MUPIROCIN 1 APPLICATION(S): 20 OINTMENT TOPICAL at 22:17

## 2022-02-07 RX ADMIN — AZITHROMYCIN 255 MILLIGRAM(S): 500 TABLET, FILM COATED ORAL at 11:10

## 2022-02-07 RX ADMIN — MUPIROCIN 1 APPLICATION(S): 20 OINTMENT TOPICAL at 18:00

## 2022-02-07 RX ADMIN — ESCITALOPRAM OXALATE 10 MILLIGRAM(S): 10 TABLET, FILM COATED ORAL at 11:10

## 2022-02-07 RX ADMIN — ATORVASTATIN CALCIUM 40 MILLIGRAM(S): 80 TABLET, FILM COATED ORAL at 20:58

## 2022-02-07 RX ADMIN — Medication 0.5 MILLIGRAM(S): at 20:59

## 2022-02-07 RX ADMIN — CEFTRIAXONE 100 MILLIGRAM(S): 500 INJECTION, POWDER, FOR SOLUTION INTRAMUSCULAR; INTRAVENOUS at 12:17

## 2022-02-07 RX ADMIN — Medication 400 MILLIGRAM(S): at 11:10

## 2022-02-07 RX ADMIN — ENOXAPARIN SODIUM 40 MILLIGRAM(S): 100 INJECTION SUBCUTANEOUS at 17:57

## 2022-02-07 NOTE — CONSULT NOTE ADULT - SUBJECTIVE AND OBJECTIVE BOX
Patient is a 57y old  Male who presents with a chief complaint of fever CP    HPI:  58 y/o male with h/o multiple myeloma not on immunotherapy, MR and TR repairs in Nov 2021, HL, COVID quadruple vaccinated (last vaccinated  a week PTA) was admitted on 2/6 for fever, SOB, cough and CP. He developed low grade temp x 4 days. Associated SOB, cough , left sided chest discomfort. Reports he feels like pain is worsened with deep breaths. Describes pain as sharp. Tm was 101. Pt also describes back pain with right leg irradiation for 5 weeks. In ER he received ceftriaxone and azithromycin.     PMH: as above  PSH: as above  Meds: per reconciliation sheet, noted below  MEDICATIONS  (STANDING):  acyclovir   Oral Tab/Cap 400 milliGRAM(s) Oral daily  ALPRAZolam 0.5 milliGRAM(s) Oral at bedtime  atorvastatin 40 milliGRAM(s) Oral at bedtime  azithromycin  IVPB 500 milliGRAM(s) IV Intermittent every 24 hours  cefTRIAXone   IVPB 1000 milliGRAM(s) IV Intermittent every 24 hours  enoxaparin Injectable 40 milliGRAM(s) SubCutaneous daily  escitalopram 10 milliGRAM(s) Oral daily  mupirocin 2% Nasal 1 Application(s) Both Nostrils two times a day    MEDICATIONS  (PRN):  acetaminophen     Tablet .. 650 milliGRAM(s) Oral every 6 hours PRN Temp greater or equal to 38C (100.4F), Mild Pain (1 - 3)  aluminum hydroxide/magnesium hydroxide/simethicone Suspension 30 milliLiter(s) Oral every 4 hours PRN Dyspepsia  melatonin 3 milliGRAM(s) Oral at bedtime PRN Insomnia  ondansetron Injectable 4 milliGRAM(s) IV Push every 8 hours PRN Nausea and/or Vomiting  oxyCODONE    IR 2.5 milliGRAM(s) Oral every 6 hours PRN for moderate pain    Allergies  No Known Allergies  Intolerances    Social: no smoking, no alcohol, no illegal drugs; no recent travel, no exposure to TB  FAMILY HISTORY:  No pertinent family history in first degree relatives      no history of premature cardiovascular disease in first degree relatives    ROS: the patient denies HA, no seizures, no dizziness, no sore throat, no nasal congestion, no blurry vision, no CP, no palpitations, has SOB, has cough, no abdominal pain, no diarrhea, no N/V, no dysuria, no leg pain, no claudication, no rash, no joint aches, no rectal pain or bleeding, no night sweats  All other systems reviewed and are negative    Vital Signs Last 24 Hrs  T(C): 36.7 (07 Feb 2022 08:22), Max: 37.2 (06 Feb 2022 20:15)  T(F): 98.1 (07 Feb 2022 08:22), Max: 98.9 (06 Feb 2022 20:15)  HR: 94 (07 Feb 2022 08:22) (88 - 102)  BP: 108/70 (07 Feb 2022 08:22) (108/70 - 118/78)  BP(mean): --  RR: 17 (07 Feb 2022 08:22) (16 - 17)  SpO2: 95% (07 Feb 2022 08:22) (95% - 95%)  Daily       PE:    Constitutional:  No acute distress  HEENT: NC/AT, EOMI, PERRLA, conjunctivae clear; ears and nose atraumatic; pharynx benign  Neck: supple; thyroid not palpable  Back: no tenderness  Respiratory: respiratory effort normal; crackles at bases  Cardiovascular: S1S2 regular, no murmurs  Abdomen: soft, not tender, not distended, positive BS; no liver or spleen organomegaly  Genitourinary: no suprapubic tenderness  Lymphatic: no LN palpable  Musculoskeletal: no muscle tenderness, no joint swelling or tenderness  Extremities: no pedal edema  Neurological/ Psychiatric: AxOx3, judgement and insight normal; moving all extremities  Skin: no rashes; no palpable lesions    Labs: all available labs reviewed                        13.2   9.62  )-----------( 148      ( 06 Feb 2022 08:47 )             39.6     02-06    140  |  108  |  15  ----------------------------<  104<H>  4.1   |  27  |  1.06    Ca    8.9      06 Feb 2022 08:47  Mg     2.4     02-06    TPro  6.7  /  Alb  3.1<L>  /  TBili  1.7<H>  /  DBili  x   /  AST  14<L>  /  ALT  24  /  AlkPhos  93  02-06     LIVER FUNCTIONS - ( 06 Feb 2022 08:47 )  Alb: 3.1 g/dL / Pro: 6.7 gm/dL / ALK PHOS: 93 U/L / ALT: 24 U/L / AST: 14 U/L / GGT: x           (02-06 @ 08:47)  [facility]    Radiology: all available radiological tests reviewed    < from: MR Lumbar Spine No Cont (02.07.22 @ 10:21) >  IMPRESSION:  Moderate degenerative disc disease and and mild spondylosis   at L1-2 through L4-5 with mild bulges are noted at L2-3 through L4-5   which flatten the ventral thecal sac and narrow the BILATERAL neural   foramina. Mild central stenosis at L2-3 and L3-4 and severe central   stenosis at L4-5on a degenerative basis. Large superimposed RIGHT   paracentral 2 cm L4-5 disc herniation which markedly compresses the RIGHT   aspect of the thecal sac and the traversing RIGHT L5 nerve root.  < end of copied text >    < from: Xray Chest 1 View- PORTABLE-Urgent (Xray Chest 1 View- PORTABLE-Urgent .) (02.06.22 @ 10:32) >  IMPRESSION: No acute finding or change.  < end of copied text >    < from: CT Angio Chest PE Protocol w/ IV Cont (02.06.22 @ 10:10) >  No evidence for pulmonary thromboembolism.  Small left pleural effusion and trace right pleural effusion, with   subsegmental bibasilar compressive atelectatic changes. Mild patchy   groundglass opacities are seen in the bilateral lower lobes and right   middle lobe, with a bibasilar predominance. Mild bronchial wall   thickening. Findings are nonspecific; differential includes multifocal   pneumonia versus pulmonary vascular congestion. Recommend clinical   correlation and follow-up imaging to document resolution.    Small pericardial effusion, new compared to the prior study from   7/17/2021, presumably related to recent tricuspid and mitral valve surgery.  Mildly enlarged main pulmonary artery, correlate for pulmonary artery   hypertension.  < end of copied text >    Advanced directives addressed: full resuscitation Patient is a 57y old  Male who presents with a chief complaint of fever CP    HPI:  56 y/o male with h/o multiple myeloma on immunotherapy (until Nov 2021), MR and TR repairs in Nov 2021, HL, COVID quadruple vaccinated (last vaccinated  a week PTA) was admitted on 2/6 for fever, SOB, cough and CP. He developed low grade temp x 4 days. Associated SOB, cough , left sided chest discomfort. Reports he feels like pain is worsened with deep breaths. Describes pain as sharp. Tm was 101. Pt also describes back pain with right leg irradiation for 5 weeks. In ER he received ceftriaxone and azithromycin.     PMH: as above  PSH: as above  Meds: per reconciliation sheet, noted below  MEDICATIONS  (STANDING):  acyclovir   Oral Tab/Cap 400 milliGRAM(s) Oral daily  ALPRAZolam 0.5 milliGRAM(s) Oral at bedtime  atorvastatin 40 milliGRAM(s) Oral at bedtime  azithromycin  IVPB 500 milliGRAM(s) IV Intermittent every 24 hours  cefTRIAXone   IVPB 1000 milliGRAM(s) IV Intermittent every 24 hours  enoxaparin Injectable 40 milliGRAM(s) SubCutaneous daily  escitalopram 10 milliGRAM(s) Oral daily  mupirocin 2% Nasal 1 Application(s) Both Nostrils two times a day    MEDICATIONS  (PRN):  acetaminophen     Tablet .. 650 milliGRAM(s) Oral every 6 hours PRN Temp greater or equal to 38C (100.4F), Mild Pain (1 - 3)  aluminum hydroxide/magnesium hydroxide/simethicone Suspension 30 milliLiter(s) Oral every 4 hours PRN Dyspepsia  melatonin 3 milliGRAM(s) Oral at bedtime PRN Insomnia  ondansetron Injectable 4 milliGRAM(s) IV Push every 8 hours PRN Nausea and/or Vomiting  oxyCODONE    IR 2.5 milliGRAM(s) Oral every 6 hours PRN for moderate pain    Allergies  No Known Allergies  Intolerances    Social: no smoking, no alcohol, no illegal drugs; no recent travel, no exposure to TB  FAMILY HISTORY:  No pertinent family history in first degree relatives      no history of premature cardiovascular disease in first degree relatives    ROS: the patient denies HA, no seizures, no dizziness, no sore throat, no nasal congestion, no blurry vision, no CP, no palpitations, has SOB, has cough, no abdominal pain, no diarrhea, no N/V, no dysuria, no leg pain, no claudication, no rash, no joint aches, no rectal pain or bleeding, no night sweats  All other systems reviewed and are negative    Vital Signs Last 24 Hrs  T(C): 36.7 (07 Feb 2022 08:22), Max: 37.2 (06 Feb 2022 20:15)  T(F): 98.1 (07 Feb 2022 08:22), Max: 98.9 (06 Feb 2022 20:15)  HR: 94 (07 Feb 2022 08:22) (88 - 102)  BP: 108/70 (07 Feb 2022 08:22) (108/70 - 118/78)  BP(mean): --  RR: 17 (07 Feb 2022 08:22) (16 - 17)  SpO2: 95% (07 Feb 2022 08:22) (95% - 95%)  Daily       PE:    Constitutional:  No acute distress  HEENT: NC/AT, EOMI, PERRLA, conjunctivae clear; ears and nose atraumatic; pharynx benign  Neck: supple; thyroid not palpable  Back: no tenderness  Respiratory: respiratory effort normal; crackles at bases  Cardiovascular: S1S2 regular, no murmurs  Abdomen: soft, not tender, not distended, positive BS; no liver or spleen organomegaly  Genitourinary: no suprapubic tenderness  Lymphatic: no LN palpable  Musculoskeletal: no muscle tenderness, no joint swelling or tenderness  Extremities: no pedal edema  Neurological/ Psychiatric: AxOx3, judgement and insight normal; moving all extremities  Skin: no rashes; no palpable lesions    Labs: all available labs reviewed                        13.2   9.62  )-----------( 148      ( 06 Feb 2022 08:47 )             39.6     02-06    140  |  108  |  15  ----------------------------<  104<H>  4.1   |  27  |  1.06    Ca    8.9      06 Feb 2022 08:47  Mg     2.4     02-06    TPro  6.7  /  Alb  3.1<L>  /  TBili  1.7<H>  /  DBili  x   /  AST  14<L>  /  ALT  24  /  AlkPhos  93  02-06     LIVER FUNCTIONS - ( 06 Feb 2022 08:47 )  Alb: 3.1 g/dL / Pro: 6.7 gm/dL / ALK PHOS: 93 U/L / ALT: 24 U/L / AST: 14 U/L / GGT: x           (02-06 @ 08:47)  [facility]    Radiology: all available radiological tests reviewed    < from: MR Lumbar Spine No Cont (02.07.22 @ 10:21) >  IMPRESSION:  Moderate degenerative disc disease and and mild spondylosis   at L1-2 through L4-5 with mild bulges are noted at L2-3 through L4-5   which flatten the ventral thecal sac and narrow the BILATERAL neural   foramina. Mild central stenosis at L2-3 and L3-4 and severe central   stenosis at L4-5on a degenerative basis. Large superimposed RIGHT   paracentral 2 cm L4-5 disc herniation which markedly compresses the RIGHT   aspect of the thecal sac and the traversing RIGHT L5 nerve root.  < end of copied text >    < from: Xray Chest 1 View- PORTABLE-Urgent (Xray Chest 1 View- PORTABLE-Urgent .) (02.06.22 @ 10:32) >  IMPRESSION: No acute finding or change.  < end of copied text >    < from: CT Angio Chest PE Protocol w/ IV Cont (02.06.22 @ 10:10) >  No evidence for pulmonary thromboembolism.  Small left pleural effusion and trace right pleural effusion, with   subsegmental bibasilar compressive atelectatic changes. Mild patchy   groundglass opacities are seen in the bilateral lower lobes and right   middle lobe, with a bibasilar predominance. Mild bronchial wall   thickening. Findings are nonspecific; differential includes multifocal   pneumonia versus pulmonary vascular congestion. Recommend clinical   correlation and follow-up imaging to document resolution.    Small pericardial effusion, new compared to the prior study from   7/17/2021, presumably related to recent tricuspid and mitral valve surgery.  Mildly enlarged main pulmonary artery, correlate for pulmonary artery   hypertension.  < end of copied text >    Advanced directives addressed: full resuscitation

## 2022-02-08 LAB
ANION GAP SERPL CALC-SCNC: 3 MMOL/L — LOW (ref 5–17)
BUN SERPL-MCNC: 17 MG/DL — SIGNIFICANT CHANGE UP (ref 7–23)
CALCIUM SERPL-MCNC: 9.1 MG/DL — SIGNIFICANT CHANGE UP (ref 8.5–10.1)
CHLORIDE SERPL-SCNC: 105 MMOL/L — SIGNIFICANT CHANGE UP (ref 96–108)
CO2 SERPL-SCNC: 30 MMOL/L — SIGNIFICANT CHANGE UP (ref 22–31)
CREAT SERPL-MCNC: 1 MG/DL — SIGNIFICANT CHANGE UP (ref 0.5–1.3)
GLUCOSE SERPL-MCNC: 120 MG/DL — HIGH (ref 70–99)
HCT VFR BLD CALC: 37.7 % — LOW (ref 39–50)
HGB BLD-MCNC: 12.4 G/DL — LOW (ref 13–17)
MCHC RBC-ENTMCNC: 32.9 GM/DL — SIGNIFICANT CHANGE UP (ref 32–36)
MCHC RBC-ENTMCNC: 33.2 PG — SIGNIFICANT CHANGE UP (ref 27–34)
MCV RBC AUTO: 100.8 FL — HIGH (ref 80–100)
PLATELET # BLD AUTO: 190 K/UL — SIGNIFICANT CHANGE UP (ref 150–400)
POTASSIUM SERPL-MCNC: 4 MMOL/L — SIGNIFICANT CHANGE UP (ref 3.5–5.3)
POTASSIUM SERPL-SCNC: 4 MMOL/L — SIGNIFICANT CHANGE UP (ref 3.5–5.3)
RBC # BLD: 3.74 M/UL — LOW (ref 4.2–5.8)
RBC # FLD: 12.1 % — SIGNIFICANT CHANGE UP (ref 10.3–14.5)
SODIUM SERPL-SCNC: 138 MMOL/L — SIGNIFICANT CHANGE UP (ref 135–145)
WBC # BLD: 6.41 K/UL — SIGNIFICANT CHANGE UP (ref 3.8–10.5)
WBC # FLD AUTO: 6.41 K/UL — SIGNIFICANT CHANGE UP (ref 3.8–10.5)

## 2022-02-08 PROCEDURE — 99232 SBSQ HOSP IP/OBS MODERATE 35: CPT

## 2022-02-08 PROCEDURE — 99221 1ST HOSP IP/OBS SF/LOW 40: CPT

## 2022-02-08 RX ORDER — METOPROLOL TARTRATE 50 MG
12.5 TABLET ORAL DAILY
Refills: 0 | Status: DISCONTINUED | OUTPATIENT
Start: 2022-02-08 | End: 2022-02-09

## 2022-02-08 RX ADMIN — Medication 0.5 MILLIGRAM(S): at 21:07

## 2022-02-08 RX ADMIN — ESCITALOPRAM OXALATE 10 MILLIGRAM(S): 10 TABLET, FILM COATED ORAL at 09:30

## 2022-02-08 RX ADMIN — CEFTRIAXONE 100 MILLIGRAM(S): 500 INJECTION, POWDER, FOR SOLUTION INTRAMUSCULAR; INTRAVENOUS at 11:00

## 2022-02-08 RX ADMIN — Medication 400 MILLIGRAM(S): at 09:30

## 2022-02-08 RX ADMIN — ENOXAPARIN SODIUM 40 MILLIGRAM(S): 100 INJECTION SUBCUTANEOUS at 16:50

## 2022-02-08 RX ADMIN — Medication 12.5 MILLIGRAM(S): at 17:56

## 2022-02-08 RX ADMIN — MUPIROCIN 1 APPLICATION(S): 20 OINTMENT TOPICAL at 17:08

## 2022-02-08 RX ADMIN — AZITHROMYCIN 255 MILLIGRAM(S): 500 TABLET, FILM COATED ORAL at 10:00

## 2022-02-08 RX ADMIN — ATORVASTATIN CALCIUM 40 MILLIGRAM(S): 80 TABLET, FILM COATED ORAL at 21:08

## 2022-02-08 RX ADMIN — Medication 650 MILLIGRAM(S): at 17:55

## 2022-02-08 RX ADMIN — MUPIROCIN 1 APPLICATION(S): 20 OINTMENT TOPICAL at 22:00

## 2022-02-08 NOTE — CONSULT NOTE ADULT - ASSESSMENT
Patient is a 56 y/o male with a PMHx of myeloma not on immunotherapy ,MR, quadruple vaccinated (last vaccinated  a week ago ) presents to the ED c/o low grade temp x 4 days found to have multifocal PNA, on rocephin, and zithromax.  MRI L Spine significant for large right L4-5 disc herniation with impingement on right L5 nerve root.    Plan:  - Films to be reviewed by Dr. Beverly  - Continue antibiotics as per ID  - Hx MVR, TVR 11/2021 on baby aspirin, last dose 2/6

## 2022-02-09 ENCOUNTER — TRANSCRIPTION ENCOUNTER (OUTPATIENT)
Age: 57
End: 2022-02-09

## 2022-02-09 VITALS
DIASTOLIC BLOOD PRESSURE: 77 MMHG | HEART RATE: 87 BPM | OXYGEN SATURATION: 95 % | SYSTOLIC BLOOD PRESSURE: 102 MMHG | RESPIRATION RATE: 18 BRPM | TEMPERATURE: 98 F

## 2022-02-09 PROCEDURE — 99239 HOSP IP/OBS DSCHRG MGMT >30: CPT

## 2022-02-09 PROCEDURE — 99232 SBSQ HOSP IP/OBS MODERATE 35: CPT

## 2022-02-09 RX ORDER — METOPROLOL TARTRATE 50 MG
1 TABLET ORAL
Qty: 0 | Refills: 0 | DISCHARGE

## 2022-02-09 RX ORDER — INFLUENZA VIRUS VACCINE 15; 15; 15; 15 UG/.5ML; UG/.5ML; UG/.5ML; UG/.5ML
0.5 SUSPENSION INTRAMUSCULAR
Qty: 0 | Refills: 0 | DISCHARGE

## 2022-02-09 RX ORDER — CEFUROXIME AXETIL 250 MG
1 TABLET ORAL
Qty: 10 | Refills: 0
Start: 2022-02-09 | End: 2022-02-13

## 2022-02-09 RX ORDER — RNA INGREDIENT BNT-162B2 0.23 G/1.8ML
0.3 INJECTION, SUSPENSION INTRAMUSCULAR
Qty: 0 | Refills: 0 | DISCHARGE

## 2022-02-09 RX ADMIN — MUPIROCIN 1 APPLICATION(S): 20 OINTMENT TOPICAL at 10:11

## 2022-02-09 RX ADMIN — ESCITALOPRAM OXALATE 10 MILLIGRAM(S): 10 TABLET, FILM COATED ORAL at 10:16

## 2022-02-09 RX ADMIN — ENOXAPARIN SODIUM 40 MILLIGRAM(S): 100 INJECTION SUBCUTANEOUS at 10:11

## 2022-02-09 RX ADMIN — Medication 12.5 MILLIGRAM(S): at 10:11

## 2022-02-09 RX ADMIN — CEFTRIAXONE 100 MILLIGRAM(S): 500 INJECTION, POWDER, FOR SOLUTION INTRAMUSCULAR; INTRAVENOUS at 11:19

## 2022-02-09 RX ADMIN — Medication 400 MILLIGRAM(S): at 10:11

## 2022-02-09 RX ADMIN — AZITHROMYCIN 255 MILLIGRAM(S): 500 TABLET, FILM COATED ORAL at 10:15

## 2022-02-09 NOTE — DISCHARGE NOTE NURSING/CASE MANAGEMENT/SOCIAL WORK - PATIENT PORTAL LINK FT
You can access the FollowMyHealth Patient Portal offered by [facility] by registering at the following website: [email or web address] By joining [employer]’s FollowMyHealth portal, you will also be able to view your health information using other applications (apps) compatible with our system.

## 2022-02-09 NOTE — DISCHARGE NOTE PROVIDER - NSDCCPCAREPLAN_GEN_ALL_CORE_FT
PRINCIPAL DISCHARGE DIAGNOSIS  Diagnosis: Pneumonia  Assessment and Plan of Treatment: You completed 4 days of IV antibiotics.  COntinue oral antibiotics for 5 more days (sent to pharmacy)      SECONDARY DISCHARGE DIAGNOSES  Diagnosis: Tachycardia  Assessment and Plan of Treatment: Would recommend lower dose of metoprolol, start at 12.5mg with blood pressure monitoring.  Can increase to 25mg if blood pressure tolerates.  Please follow up with pcp and or cardiologist for adjustment.    Diagnosis: Chronic back pain  Assessment and Plan of Treatment: Due to degeneration, stenosis of L4-L5, and disc hernation.  Follow up with Dr. Beverly for further management options.

## 2022-02-09 NOTE — DISCHARGE NOTE PROVIDER - NSDCMRMEDTOKEN_GEN_ALL_CORE_FT
acyclovir 200 mg oral capsule: 2 cap(s) orally once a day  ALPRAZolam 0.5 mg oral tablet: 1 tab(s) orally once a day (at bedtime)  aspirin 81 mg oral delayed release tablet: 1 tab(s) orally once a day  atorvastatin 40 mg oral tablet: 1 tab(s) orally once a day  Bactroban 2% nasal ointment: 1  nasal once a day  Caltrate 600 + D oral tablet: 1 tab(s) orally once a day  cefuroxime 500 mg oral tablet: 1 tab(s) orally 2 times a day   escitalopram 10 mg oral tablet: 1 tab(s) orally once a day  metoprolol succinate 25 mg oral tablet, extended release: 0.5 tab(s) orally once a day  oxyCODONE 5 mg oral tablet: 0.5 tab(s) orally every 6 hours, As Needed - for moderate pain  Seysara 100 mg oral tablet: 1 tab(s) orally once a day  Tylenol 500 mg oral tablet: 2 tab(s) orally every 6 hours, As Needed

## 2022-02-09 NOTE — DISCHARGE NOTE PROVIDER - CARE PROVIDER_API CALL
Jeffry Beverly; PhD)  Neurosurgery  [address] [facility] [address] [place] [address]  Phone: [phone]  Fax: [phone]  Follow Up Time: Routine    follow up with pcp and or cardiologist for management of blood pressure,   Phone: (   )    -  Fax: (   )    -  Follow Up Time:

## 2022-02-09 NOTE — DISCHARGE NOTE PROVIDER - PROVIDER TOKENS
PROVIDER:[TOKEN:[[id number]],FOLLOWUP:[Routine]],FREE:[LAST:[follow up with pcp and or cardiologist for management of blood pressure],PHONE:[(   )    -],FAX:[(   )    -]]

## 2022-02-09 NOTE — DISCHARGE NOTE NURSING/CASE MANAGEMENT/SOCIAL WORK - NSDCPEFALRISK_GEN_ALL_CORE
For information on Fall & Injury Prevention, visit: [email or web address] OR  [email or web address] OR  https://www.cdc.gov/steadi/patient.html

## 2022-02-09 NOTE — PROGRESS NOTE ADULT - SUBJECTIVE AND OBJECTIVE BOX
CC: Fever, CP  History of Present Illness:   56 y/o male with a PMHx of  myeloma not on immunotherapy , MR, quadruple vaccinated ( last vaccinated  a week ago ) presents to the ED c/o low grade temp x4 days. Associated SOB, cough , left sided chest discomfort. Reports he feels like pain is worsened with deep breaths. Describes pain as sharp. Had Mitral valve repair and Tricuspid Valve repair few months ago ( Nov 2021). Tm was 101. Pt also describes back pain with right leg irradiation for 5 weeks; in view of his h/o MM I will order MRI lumbar spine. Admit for PNA, immunocompromised. He is considered in remission and will have to me on maintenance Rx until 2024, treatment not started yet.    2/8: In bed, feeling better, denies CP, SOB.  Symptoms improving.  Denies fever, chills, N, V, abd pain, CP, SOB.    REVIEW OF SYSTEMS: All other review of systems is negative unless indicated above.    Vital Signs Last 24 Hrs  T(C): 36.6 (08 Feb 2022 07:58), Max: 37.1 (07 Feb 2022 23:00)  T(F): 97.9 (08 Feb 2022 07:58), Max: 98.8 (07 Feb 2022 23:00)  HR: 93 (08 Feb 2022 07:58) (93 - 103)  BP: 101/63 (08 Feb 2022 07:58) (101/63 - 119/80)  BP(mean): --  RR: 16 (08 Feb 2022 07:58) (16 - 18)  SpO2: 95% (08 Feb 2022 07:58) (94% - 95%)    PHYSICAL EXAM:    Constitutional: NAD, awake and alert, well-developed  HEENT: PERR, EOMI, Normal Hearing, MMM  Neck: Soft and supple  Respiratory: Breath sounds are clear bilaterally, No wheezing, rales or rhonchi  Cardiovascular: S1 and S2, regular rate and rhythm, no Murmurs, gallops or rubs, midline scar clean, intact  Gastrointestinal: Bowel Sounds present, soft, nontender, nondistended, no guarding, no rebound  Extremities: No peripheral edema  Neurological: A/O x 3, no focal deficits in my limited exam        MEDICATIONS  (STANDING):  acyclovir   Oral Tab/Cap 400 milliGRAM(s) Oral daily  ALPRAZolam 0.5 milliGRAM(s) Oral at bedtime  atorvastatin 40 milliGRAM(s) Oral at bedtime  azithromycin  IVPB 500 milliGRAM(s) IV Intermittent every 24 hours  cefTRIAXone   IVPB 1000 milliGRAM(s) IV Intermittent every 24 hours  enoxaparin Injectable 40 milliGRAM(s) SubCutaneous daily  escitalopram 10 milliGRAM(s) Oral daily  metoprolol succinate ER 12.5 milliGRAM(s) Oral daily  mupirocin 2% Nasal 1 Application(s) Both Nostrils two times a day    MEDICATIONS  (PRN):  acetaminophen     Tablet .. 650 milliGRAM(s) Oral every 6 hours PRN Temp greater or equal to 38C (100.4F), Mild Pain (1 - 3)  aluminum hydroxide/magnesium hydroxide/simethicone Suspension 30 milliLiter(s) Oral every 4 hours PRN Dyspepsia  melatonin 3 milliGRAM(s) Oral at bedtime PRN Insomnia  ondansetron Injectable 4 milliGRAM(s) IV Push every 8 hours PRN Nausea and/or Vomiting  oxyCODONE    IR 2.5 milliGRAM(s) Oral every 6 hours PRN for moderate pain                                12.4   6.41  )-----------( 190      ( 08 Feb 2022 10:59 )             37.7     02-08    138  |  105  |  17  ----------------------------<  120<H>  4.0   |  30  |  1.00    Ca    9.1      08 Feb 2022 10:59      CAPILLARY BLOOD GLUCOSE            Assessment and Plan:  56 y/o male with a PMHx of  myeloma  was on immunotherapy , MR, quadruple vaccinated ( last vaccinated  a week ago ) presents to the ED c/o low grade temp x4 days. Associated SOB, cough , left sided chest discomfort.     1. Multifocal pneumonia:  Immunocompromised host  - Continue IV rocephin and zithromax per ID  - blood cultures neg x 2  -satting well on room air, acute respiratory failure ruled out    2. H/O Multiple myeloma  Was on immunotherapy  Continue acyclovir  Outpatient heme onc follow up    3. S/P recent MV and TR repair in 11/21  - Outpatient cardiology follow up  -tachycardia, was on home metoprolol 75mg, BP borderline low, start at 12.5mg and monitor    4. Back pain with H/O MM  -MRI concerning for degenerative disease, L4-L5 severe stenosis, hernation.  -Neurosurgical consult Dr. Beverly    5. DVT prophylaxis.         
Date of service: 02-08-22 @ 14:27    Lying in bed in NAD  SOB is improving  Mild cough  No further fever    ROS: no fever or chills; denies dizziness, no HA, no abdominal pain, no diarrhea or constipation; no dysuria, no legs pain, no rashes    MEDICATIONS  (STANDING):  acyclovir   Oral Tab/Cap 400 milliGRAM(s) Oral daily  ALPRAZolam 0.5 milliGRAM(s) Oral at bedtime  atorvastatin 40 milliGRAM(s) Oral at bedtime  azithromycin  IVPB 500 milliGRAM(s) IV Intermittent every 24 hours  cefTRIAXone   IVPB 1000 milliGRAM(s) IV Intermittent every 24 hours  enoxaparin Injectable 40 milliGRAM(s) SubCutaneous daily  escitalopram 10 milliGRAM(s) Oral daily  metoprolol succinate ER 12.5 milliGRAM(s) Oral daily  mupirocin 2% Nasal 1 Application(s) Both Nostrils two times a day    Vital Signs Last 24 Hrs  T(C): 36.6 (08 Feb 2022 07:58), Max: 37.1 (07 Feb 2022 23:00)  T(F): 97.9 (08 Feb 2022 07:58), Max: 98.8 (07 Feb 2022 23:00)  HR: 93 (08 Feb 2022 07:58) (93 - 103)  BP: 101/63 (08 Feb 2022 07:58) (101/63 - 119/80)  BP(mean): --  RR: 16 (08 Feb 2022 07:58) (16 - 18)  SpO2: 95% (08 Feb 2022 07:58) (94% - 95%)     Physical exam:    Constitutional:  No acute distress  HEENT: NC/AT, EOMI, PERRLA, conjunctivae clear; ears and nose atraumatic  Neck: supple; thyroid not palpable  Back: no tenderness  Respiratory: respiratory effort normal; crackles at bases  Cardiovascular: S1S2 regular, no murmurs  Abdomen: soft, not tender, not distended, positive BS  Genitourinary: no suprapubic tenderness  Lymphatic: no LN palpable  Musculoskeletal: no muscle tenderness, no joint swelling or tenderness  Extremities: no pedal edema  Neurological/ Psychiatric: AxOx3, moving all extremities  Skin: no rashes; no palpable lesions    Labs: reviewed                        12.4   6.41  )-----------( 190      ( 08 Feb 2022 10:59 )             37.7     02-08    138  |  105  |  17  ----------------------------<  120<H>  4.0   |  30  |  1.00    Ca    9.1      08 Feb 2022 10:59    D-Dimer Assay, Quantitative: 681 ng/mL DDU (02-06-22 @ 08:47)                        13.2   9.62  )-----------( 148      ( 06 Feb 2022 08:47 )             39.6     02-06    140  |  108  |  15  ----------------------------<  104<H>  4.1   |  27  |  1.06    Ca    8.9      06 Feb 2022 08:47  Mg     2.4     02-06    TPro  6.7  /  Alb  3.1<L>  /  TBili  1.7<H>  /  DBili  x   /  AST  14<L>  /  ALT  24  /  AlkPhos  93  02-06     LIVER FUNCTIONS - ( 06 Feb 2022 08:47 )  Alb: 3.1 g/dL / Pro: 6.7 gm/dL / ALK PHOS: 93 U/L / ALT: 24 U/L / AST: 14 U/L / GGT: x           (02-06 @ 08:47)  [facility]      Culture - Blood (collected 06 Feb 2022 12:09)  Source: .Blood None  Preliminary Report (07 Feb 2022 19:01):    No growth to date.    Culture - Blood (collected 06 Feb 2022 12:09)  Source: .Blood None  Preliminary Report (07 Feb 2022 19:01):    No growth to date.    Radiology: all available radiological tests reviewed    < from: MR Lumbar Spine No Cont (02.07.22 @ 10:21) >  IMPRESSION:  Moderate degenerative disc disease and and mild spondylosis   at L1-2 through L4-5 with mild bulges are noted at L2-3 through L4-5   which flatten the ventral thecal sac and narrow the BILATERAL neural   foramina. Mild central stenosis at L2-3 and L3-4 and severe central   stenosis at L4-5on a degenerative basis. Large superimposed RIGHT   paracentral 2 cm L4-5 disc herniation which markedly compresses the RIGHT   aspect of the thecal sac and the traversing RIGHT L5 nerve root.  < end of copied text >    < from: Xray Chest 1 View- PORTABLE-Urgent (Xray Chest 1 View- PORTABLE-Urgent .) (02.06.22 @ 10:32) >  IMPRESSION: No acute finding or change.  < end of copied text >    < from: CT Angio Chest PE Protocol w/ IV Cont (02.06.22 @ 10:10) >  No evidence for pulmonary thromboembolism.  Small left pleural effusion and trace right pleural effusion, with   subsegmental bibasilar compressive atelectatic changes. Mild patchy   groundglass opacities are seen in the bilateral lower lobes and right   middle lobe, with a bibasilar predominance. Mild bronchial wall   thickening. Findings are nonspecific; differential includes multifocal   pneumonia versus pulmonary vascular congestion. Recommend clinical   correlation and follow-up imaging to document resolution.    Small pericardial effusion, new compared to the prior study from   7/17/2021, presumably related to recent tricuspid and mitral valve surgery.  Mildly enlarged main pulmonary artery, correlate for pulmonary artery   hypertension.  < end of copied text >    Advanced directives addressed: full resuscitation
Date of service: 02-09-22 @ 08:34    Feels better  No SOB; cough is resolving  No fever    ROS: no fever or chills; denies dizziness, no HA, no abdominal pain, no diarrhea or constipation; no dysuria, no legs pain, no rashes    MEDICATIONS  (STANDING):  acyclovir   Oral Tab/Cap 400 milliGRAM(s) Oral daily  ALPRAZolam 0.5 milliGRAM(s) Oral at bedtime  atorvastatin 40 milliGRAM(s) Oral at bedtime  azithromycin  IVPB 500 milliGRAM(s) IV Intermittent every 24 hours  cefTRIAXone   IVPB 1000 milliGRAM(s) IV Intermittent every 24 hours  enoxaparin Injectable 40 milliGRAM(s) SubCutaneous daily  escitalopram 10 milliGRAM(s) Oral daily  metoprolol succinate ER 12.5 milliGRAM(s) Oral daily  mupirocin 2% Nasal 1 Application(s) Both Nostrils two times a day    Vital Signs Last 24 Hrs  T(C): 36.6 (09 Feb 2022 07:46), Max: 36.7 (08 Feb 2022 23:00)  T(F): 97.9 (09 Feb 2022 07:46), Max: 98.1 (08 Feb 2022 23:00)  HR: 87 (09 Feb 2022 07:46) (87 - 95)  BP: 102/77 (09 Feb 2022 07:46) (101/72 - 126/81)  BP(mean): --  RR: 18 (09 Feb 2022 07:46) (18 - 18)  SpO2: 95% (09 Feb 2022 07:46) (95% - 96%)     Physical exam:    Constitutional:  No acute distress  HEENT: NC/AT, EOMI, PERRLA, conjunctivae clear; ears and nose atraumatic  Neck: supple; thyroid not palpable  Back: no tenderness  Respiratory: respiratory effort normal; decreased BS at bases  Cardiovascular: S1S2 regular, no murmurs  Abdomen: soft, not tender, not distended, positive BS  Genitourinary: no suprapubic tenderness  Lymphatic: no LN palpable  Musculoskeletal: no muscle tenderness, no joint swelling or tenderness  Extremities: no pedal edema  Neurological/ Psychiatric: AxOx3, moving all extremities  Skin: no rashes; no palpable lesions    Labs: reviewed                        12.4   6.41  )-----------( 190      ( 08 Feb 2022 10:59 )             37.7     02-08    138  |  105  |  17  ----------------------------<  120<H>  4.0   |  30  |  1.00    Ca    9.1      08 Feb 2022 10:59    D-Dimer Assay, Quantitative: 681 ng/mL DDU (02-06-22 @ 08:47)                        13.2   9.62  )-----------( 148      ( 06 Feb 2022 08:47 )             39.6     02-06    140  |  108  |  15  ----------------------------<  104<H>  4.1   |  27  |  1.06    Ca    8.9      06 Feb 2022 08:47  Mg     2.4     02-06    TPro  6.7  /  Alb  3.1<L>  /  TBili  1.7<H>  /  DBili  x   /  AST  14<L>  /  ALT  24  /  AlkPhos  93  02-06     LIVER FUNCTIONS - ( 06 Feb 2022 08:47 )  Alb: 3.1 g/dL / Pro: 6.7 gm/dL / ALK PHOS: 93 U/L / ALT: 24 U/L / AST: 14 U/L / GGT: x           (02-06 @ 08:47)  [facility]      Culture - Blood (collected 06 Feb 2022 12:09)  Source: .Blood None  Preliminary Report (07 Feb 2022 19:01):    No growth to date.    Culture - Blood (collected 06 Feb 2022 12:09)  Source: .Blood None  Preliminary Report (07 Feb 2022 19:01):    No growth to date.    Radiology: all available radiological tests reviewed    < from: MR Lumbar Spine No Cont (02.07.22 @ 10:21) >  IMPRESSION:  Moderate degenerative disc disease and and mild spondylosis   at L1-2 through L4-5 with mild bulges are noted at L2-3 through L4-5   which flatten the ventral thecal sac and narrow the BILATERAL neural   foramina. Mild central stenosis at L2-3 and L3-4 and severe central   stenosis at L4-5on a degenerative basis. Large superimposed RIGHT   paracentral 2 cm L4-5 disc herniation which markedly compresses the RIGHT   aspect of the thecal sac and the traversing RIGHT L5 nerve root.  < end of copied text >    < from: Xray Chest 1 View- PORTABLE-Urgent (Xray Chest 1 View- PORTABLE-Urgent .) (02.06.22 @ 10:32) >  IMPRESSION: No acute finding or change.  < end of copied text >    < from: CT Angio Chest PE Protocol w/ IV Cont (02.06.22 @ 10:10) >  No evidence for pulmonary thromboembolism.  Small left pleural effusion and trace right pleural effusion, with   subsegmental bibasilar compressive atelectatic changes. Mild patchy   groundglass opacities are seen in the bilateral lower lobes and right   middle lobe, with a bibasilar predominance. Mild bronchial wall   thickening. Findings are nonspecific; differential includes multifocal   pneumonia versus pulmonary vascular congestion. Recommend clinical   correlation and follow-up imaging to document resolution.    Small pericardial effusion, new compared to the prior study from   7/17/2021, presumably related to recent tricuspid and mitral valve surgery.  Mildly enlarged main pulmonary artery, correlate for pulmonary artery   hypertension.  < end of copied text >    Advanced directives addressed: full resuscitation
58 y/o male with a PMHx of  myeloma  was on immunotherapy , MR, quadruple vaccinated ( last vaccinated  a week ago ) presents to the ED c/o low grade temp x4 days. Associated SOB, cough , left sided chest discomfort. Reports he feels like pain is worsened with deep breaths. Describes pain as sharp. Had Mitral valve repair and Tricuspid Valve repair few months ago ( Nov 2021). Tm was 101. Pt also describes back pain with right leg radiation for 5 weeks; in view of his h/o MM I will order MRI lumbar spine. Admit for PNA, immunocompromised. He is considered in remission and will be on maintenance Rx until 2024, treatment not started yet.    02/07/22: Patient seen and examined. Feels better today. He denies any new complaints. Discussed with patient and wife Camila Min in length regarding management and d/c plan.       Vital Signs Last 24 Hrs  T(C): 36.7 (07 Feb 2022 08:22), Max: 37.2 (06 Feb 2022 20:15)  T(F): 98.1 (07 Feb 2022 08:22), Max: 98.9 (06 Feb 2022 20:15)  HR: 94 (07 Feb 2022 08:22) (88 - 102)  BP: 108/70 (07 Feb 2022 08:22) (108/70 - 118/78)  BP(mean): 81 (06 Feb 2022 14:56) (81 - 81)  RR: 17 (07 Feb 2022 08:22) (16 - 18)  SpO2: 95% (07 Feb 2022 08:22) (95% - 96%)        Physical Exam:       HEENT:  nc at perrla  Neck supple no jvd no goitre  chest No ra;es or rhonchi  cvs s1s2 reg no m/g/r  abd soft nt nd + bs  extr no e/c/c  neuro non focal AAOX3  skin no rashes      Labs:                             13.2   9.62  )-----------( 148      ( 06 Feb 2022 08:47 )             39.6     06 Feb 2022 08:47    140    |  108    |  15     ----------------------------<  104    4.1     |  27     |  1.06     Ca    8.9        06 Feb 2022 08:47  Mg     2.4       06 Feb 2022 08:47    TPro  6.7    /  Alb  3.1    /  TBili  1.7    /  DBili  x      /  AST  14     /  ALT  24     /  AlkPhos  93     06 Feb 2022 08:47    LIVER FUNCTIONS - ( 06 Feb 2022 08:47 )  Alb: 3.1 g/dL / Pro: 6.7 gm/dL / ALK PHOS: 93 U/L / ALT: 24 U/L / AST: 14 U/L / GGT: x           PT/INR - ( 06 Feb 2022 08:47 )   PT: 13.8 sec;   INR: 1.19 ratio         PTT - ( 06 Feb 2022 08:47 )  PTT:30.8 sec  CAPILLARY BLOOD GLUCOSE            MEDICATIONS  (STANDING):  acyclovir   Oral Tab/Cap 400 milliGRAM(s) Oral daily  ALPRAZolam 0.5 milliGRAM(s) Oral at bedtime  atorvastatin 40 milliGRAM(s) Oral at bedtime  azithromycin  IVPB 500 milliGRAM(s) IV Intermittent every 24 hours  cefTRIAXone   IVPB 1000 milliGRAM(s) IV Intermittent every 24 hours  enoxaparin Injectable 40 milliGRAM(s) SubCutaneous daily  escitalopram 10 milliGRAM(s) Oral daily  mupirocin 2% Nasal 1 Application(s) Both Nostrils two times a day    MEDICATIONS  (PRN):  acetaminophen     Tablet .. 650 milliGRAM(s) Oral every 6 hours PRN Temp greater or equal to 38C (100.4F), Mild Pain (1 - 3)  aluminum hydroxide/magnesium hydroxide/simethicone Suspension 30 milliLiter(s) Oral every 4 hours PRN Dyspepsia  melatonin 3 milliGRAM(s) Oral at bedtime PRN Insomnia  ondansetron Injectable 4 milliGRAM(s) IV Push every 8 hours PRN Nausea and/or Vomiting  oxyCODONE    IR 2.5 milliGRAM(s) Oral every 6 hours PRN for moderate pain          
HPI:  Patient is a 56 y/o male with a PMHx of  myeloma not on immunotherapy , MR, quadruple vaccinated ( last vaccinated  a week ago ) presents to the ED c/o low grade temp x4 days. Associated SOB, cough , left sided chest discomfort. Reports he feels like pain is worsened with deep breaths. Describes pain as sharp. Had Mitral valve repair and Tricuspid Valve repair few months ago ( Nov 2021). Tm was 101. Pt also describes back pain with right leg irradiation for 5 weeks; in view of his h/o MM I will order MRI lumbar spine. Admit for PNA, immunocompromised. He is considered in remission and will have to me on maintenance Rx until 2024, treatment not started yet.     Neurosurgery consulted for MRI findings, lower back pain, pain radiating down right leg which he states has been going on for the past 5 weeks. He denies trauma, falls, or specific activity that brought on his pain. Pain is intermittent, radiates into right hamstrings; worse when sitting prolonged period of time. He denies numbness/tingling, saddles parathesias, urinary or bowel incontinence. MRI L Spine significant for large right L4-5 disc herniation with impingement on right L5 nerve root.    2/9- Patient seen and examined with Dr. Beverly this AM, no acute events overnight. Films reviewed.    Vital Signs Last 24 Hrs  T(C): 36.6 (09 Feb 2022 07:46), Max: 36.7 (08 Feb 2022 23:00)  T(F): 97.9 (09 Feb 2022 07:46), Max: 98.1 (08 Feb 2022 23:00)  HR: 87 (09 Feb 2022 07:46) (87 - 95)  BP: 102/77 (09 Feb 2022 07:46) (101/72 - 126/81)  BP(mean): --  RR: 18 (09 Feb 2022 07:46) (18 - 18)  SpO2: 95% (09 Feb 2022 07:46) (95% - 96%)    MEDICATIONS  (STANDING):  acyclovir   Oral Tab/Cap 400 milliGRAM(s) Oral daily  ALPRAZolam 0.5 milliGRAM(s) Oral at bedtime  atorvastatin 40 milliGRAM(s) Oral at bedtime  azithromycin  IVPB 500 milliGRAM(s) IV Intermittent every 24 hours  cefTRIAXone   IVPB 1000 milliGRAM(s) IV Intermittent every 24 hours  enoxaparin Injectable 40 milliGRAM(s) SubCutaneous daily  escitalopram 10 milliGRAM(s) Oral daily  metoprolol succinate ER 12.5 milliGRAM(s) Oral daily  mupirocin 2% Nasal 1 Application(s) Both Nostrils two times a day    MEDICATIONS  (PRN):  acetaminophen     Tablet .. 650 milliGRAM(s) Oral every 6 hours PRN Temp greater or equal to 38C (100.4F), Mild Pain (1 - 3)  aluminum hydroxide/magnesium hydroxide/simethicone Suspension 30 milliLiter(s) Oral every 4 hours PRN Dyspepsia  melatonin 3 milliGRAM(s) Oral at bedtime PRN Insomnia  ondansetron Injectable 4 milliGRAM(s) IV Push every 8 hours PRN Nausea and/or Vomiting  oxyCODONE    IR 2.5 milliGRAM(s) Oral every 6 hours PRN for moderate pain      PHYSICAL EXAM:  Constitutional: awake and alert  HEENT: PERRLA, EOMI,   Neck: Supple  Respiratory: Breath sounds are clear bilaterally  Cardiovascular: S1 and S2, regular  rhythm  Gastrointestinal: soft, nontender  Extremities:  no edema  Musculoskeletal: no joint swelling/tenderness, no abnormal movements  Skin: No rashes    Neurological exam:  HF: A x O x 3. Appropriately interactive, normal affect. Speech fluent, No Aphasia or paraphasic errors. Naming /repetition intact   CN: BILLIE, EOMI, VFF, facial sensation normal, no NLFD, tongue midline, Palate moves equally, SCM equal bilaterally  Motor: No pronator drift, Strength 5/5 except right EHL 4/5  Sens: Intact to light touch  Reflexes: Symmetric and normal KJ 2+, AJ 1+, downgoing toes b/l, neg clonus. + SLR on right  Gait/Balance: Normal        LABS:                         12.4   6.41  )-----------( 190      ( 08 Feb 2022 10:59 )             37.7     02-08    138  |  105  |  17  ----------------------------<  120<H>  4.0   |  30  |  1.00    Ca    9.1      08 Feb 2022 10:59

## 2022-02-09 NOTE — DISCHARGE NOTE PROVIDER - HOSPITAL COURSE
CC: Fever, CP  History of Present Illness:   58 y/o male with a PMHx of  myeloma not on immunotherapy , MR, quadruple vaccinated ( last vaccinated  a week ago ) presents to the ED c/o low grade temp x4 days. Associated SOB, cough , left sided chest discomfort. Reports he feels like pain is worsened with deep breaths. Describes pain as sharp. Had Mitral valve repair and Tricuspid Valve repair few months ago ( Nov 2021). Tm was 101. Pt also describes back pain with right leg irradiation for 5 weeks; in view of his h/o MM I will order MRI lumbar spine. Admit for PNA, immunocompromised. He is considered in remission and will have to me on maintenance Rx until 2024, treatment not started yet.    Hospital course:  Pt admitted for fever, chest pain, found to have and treated for multifocal pneumonia.  Pt placed on ceftriaxone and azithromycin for 4 days.  Pt feeling much better, denies CP, fever, chills, SOB, cough.  Per ID to go home for 5 more days with oral ceftin.  Pt also with acute on chronic low back pain due to degeneration, stenosis and disc herniation for which he is being followed by Dr. Beverly.  Pt HD stable for discharge home with out patient follow up.    REVIEW OF SYSTEMS: All other review of systems is negative unless indicated above.    Vital Signs Last 24 Hrs  T(C): 36.6 (09 Feb 2022 07:46), Max: 36.7 (08 Feb 2022 23:00)  T(F): 97.9 (09 Feb 2022 07:46), Max: 98.1 (08 Feb 2022 23:00)  HR: 87 (09 Feb 2022 07:46) (87 - 95)  BP: 102/77 (09 Feb 2022 07:46) (101/72 - 126/81)  BP(mean): --  RR: 18 (09 Feb 2022 07:46) (18 - 18)  SpO2: 95% (09 Feb 2022 07:46) (95% - 96%)    PHYSICAL EXAM:    Constitutional: NAD, awake and alert, well-developed  HEENT: PERR, EOMI, Normal Hearing, MMM  Neck: Soft and supple  Respiratory: Breath sounds are clear bilaterally, No wheezing, rales or rhonchi  Cardiovascular: S1 and S2, regular rate and rhythm, no Murmurs, gallops or rubs  Gastrointestinal: Bowel Sounds present, soft, nontender, nondistended, no guarding, no rebound  Extremities: No peripheral edema  Neurological: A/O x 3, no focal deficits in my limited exam    med/labs: Reviewed and interpreted     Assessment and Plan:  58 y/o male with a PMHx of  myeloma  was on immunotherapy , MR quadruple vaccinated ( last vaccinated  a week ago ) presents to the ED c/o low grade temp x4 days. Associated SOB, cough , left sided chest discomfort.     1. Multifocal pneumonia:  Immunocompromised host  - Continue IV rocephin and zithromax per ID day 4 and d/c on oral ceftin for 5 days.  - blood cultures neg x 2  -satting well on room air, acute respiratory failure ruled out    2. H/O Multiple myeloma  Was on immunotherapy  Continue acyclovir  Outpatient heme onc follow up    3. S/P recent MV and TR repair in 11/21  - Outpatient cardiology follow up  -tachycardia, was on home metoprolol 75mg, BP borderline low, start at 12.5mg and monitor.  would recommend low dose upon discharge until out patient pcp follow up.    4. Back pain with H/O MM  -MRI concerning for degenerative disease, L4-L5 severe stenosis, hernation.  -Neurosurgical consult Dr. Beverly appreciated, out patient follow up.    discharge home    Attending Statement: 40 minutes spent on total encounter and discharge planning.

## 2022-02-09 NOTE — PROGRESS NOTE ADULT - ASSESSMENT
56 y/o male with a PMHx of  myeloma  was on immunotherapy , MR, quadruple vaccinated ( last vaccinated  a week ago ) presents to the ED c/o low grade temp x4 days. Associated SOB, cough , left sided chest discomfort. Reports he feels like pain is worsened with deep breaths. Describes pain as sharp. Had Mitral valve repair and Tricuspid Valve repair few months ago ( Nov 2021). Tm was 101. Pt also describes back pain with right leg radiation for 5 weeks; in view of his h/o MM I will order MRI lumbar spine. Admit for PNA, immunocompromised. He is considered in remission and will be on maintenance Rx until 2024, treatment not started yet.      1. Multifocal pneumonia  Immunocompromised host  Continue IV rocephin and zithromax  Follow blood cultures  Consult ID  O2 support if needed to keep sats>92%.    2. H/O Multiple myeloma  Was on immunotherapy  Continue acyclovir  Outpatient heme onc follow up    3. S/P recent MV and TR repair in 11/21  Outpatient cardiology follow up    4. Back pain with H/O MM  Follow MRI official report    5. DVT prophylaxis. 
Patient is a 58 y/o male with a PMHx of myeloma not on immunotherapy ,MR, quadruple vaccinated (last vaccinated  a week ago ) presents to the ED c/o low grade temp x 4 days found to have multifocal PNA, on rocephin, and zithromax.  MRI L Spine significant for large right L4-5 disc herniation with impingement on right L5 nerve root.    Plan:  - No acute neurosurgical intervention at this time  - Given minimal symptoms, recent cardiac surgery and current treatment of PNA, would recommend conservative treatment at this time  - No heavy lifting >10lbs, no bending twisting the back  - Would hold off on steroids  - Instructed patient to follow up in office with Dr. Beverly in 4 weeks or sooner if new symptoms develop  - Continue antibiotics as per ID  - D/c planning today    Discussed with Dr. Beverly who reviewed films and is in agreement with the plan
58 y/o male with h/o multiple myeloma on immunotherapy (until Nov 2021), MR and TR repairs in Nov 2021, HL, COVID quadruple vaccinated (last vaccinated  a week PTA) was admitted on 2/6 for fever, SOB, cough and CP. He developed low grade temp x 4 days. Associated SOB, cough , left sided chest discomfort. Reports he feels like pain is worsened with deep breaths. Describes pain as sharp. Tm was 101. Pt also describes back pain with right leg irradiation for 5 weeks. In ER he received ceftriaxone and azithromycin.     1. Febrile syndrome improving. Multifocal pneumonia. MM. Immunocompromised host. Lumbar DJD.  -BC x 2 noted  -respiratory improving  -on ceftriaxone 1 gm IV qd and azithromycin 500 mg IV qd # 2  -tolerating abx well so far; no side effects noted  -respiratory care   -continue abx coverage; plan to change to oral regimen in AM if continues to improve  -spine evaluation underway  -monitor temps  -f/u CBC  -supportive care  2. Other issues:   -care per medicine    d/w Dr. Hernández      
56 y/o male with h/o multiple myeloma on immunotherapy (until Nov 2021), MR and TR repairs in Nov 2021, HL, COVID quadruple vaccinated (last vaccinated  a week PTA) was admitted on 2/6 for fever, SOB, cough and CP. He developed low grade temp x 4 days. Associated SOB, cough , left sided chest discomfort. Reports he feels like pain is worsened with deep breaths. Describes pain as sharp. Tm was 101. Pt also describes back pain with right leg irradiation for 5 weeks. In ER he received ceftriaxone and azithromycin.     1. Febrile syndrome improving. Multifocal pneumonia. MM. Immunocompromised host. Lumbar DJD.  -BC x 2 noted  -respiratory improving  -on ceftriaxone 1 gm IV qd and azithromycin 500 mg IV qd # 4  -tolerating abx well so far; no side effects noted  -respiratory care   -d/c azithromycin  -change ceftriaxone to ceftin 500 mg PO q12h for 5 more days  -spine evaluation underway  -monitor temps  -f/u CBC  -supportive care  2. Other issues:   -care per medicine    d/w Dr. Hernández

## 2022-02-11 DIAGNOSIS — R00.0 TACHYCARDIA, UNSPECIFIED: ICD-10-CM

## 2022-02-11 DIAGNOSIS — D84.9 IMMUNODEFICIENCY, UNSPECIFIED: ICD-10-CM

## 2022-02-11 DIAGNOSIS — I34.0 NONRHEUMATIC MITRAL (VALVE) INSUFFICIENCY: ICD-10-CM

## 2022-02-11 DIAGNOSIS — Z79.82 LONG TERM (CURRENT) USE OF ASPIRIN: ICD-10-CM

## 2022-02-11 DIAGNOSIS — J18.9 PNEUMONIA, UNSPECIFIED ORGANISM: ICD-10-CM

## 2022-02-11 DIAGNOSIS — M51.26 OTHER INTERVERTEBRAL DISC DISPLACEMENT, LUMBAR REGION: ICD-10-CM

## 2022-02-11 DIAGNOSIS — C90.01 MULTIPLE MYELOMA IN REMISSION: ICD-10-CM

## 2022-02-11 DIAGNOSIS — M48.061 SPINAL STENOSIS, LUMBAR REGION WITHOUT NEUROGENIC CLAUDICATION: ICD-10-CM

## 2022-02-11 DIAGNOSIS — G58.8 OTHER SPECIFIED MONONEUROPATHIES: ICD-10-CM

## 2022-02-11 LAB
CULTURE RESULTS: SIGNIFICANT CHANGE UP
CULTURE RESULTS: SIGNIFICANT CHANGE UP
M PNEUMO IGG SER IA-ACNC: 3.93 INDEX — HIGH (ref 0–0.9)
M PNEUMO IGG SER IA-ACNC: POSITIVE
M PNEUMO IGM SER-ACNC: 0.05 INDEX — SIGNIFICANT CHANGE UP (ref 0–0.9)
MYCOPLASMA AG SPEC QL: NEGATIVE — SIGNIFICANT CHANGE UP
SPECIMEN SOURCE: SIGNIFICANT CHANGE UP
SPECIMEN SOURCE: SIGNIFICANT CHANGE UP

## 2022-03-02 ENCOUNTER — APPOINTMENT (OUTPATIENT)
Dept: NEUROSURGERY | Facility: CLINIC | Age: 57
End: 2022-03-02
Payer: COMMERCIAL

## 2022-03-02 VITALS
HEART RATE: 80 BPM | SYSTOLIC BLOOD PRESSURE: 124 MMHG | HEIGHT: 70 IN | BODY MASS INDEX: 27.49 KG/M2 | WEIGHT: 192 LBS | DIASTOLIC BLOOD PRESSURE: 86 MMHG

## 2022-03-02 DIAGNOSIS — R35.0 FREQUENCY OF MICTURITION: ICD-10-CM

## 2022-03-02 DIAGNOSIS — N39.41 URGE INCONTINENCE: ICD-10-CM

## 2022-03-02 PROCEDURE — 99214 OFFICE O/P EST MOD 30 MIN: CPT

## 2022-03-02 RX ORDER — CYCLOBENZAPRINE HYDROCHLORIDE 5 MG/1
5 TABLET, FILM COATED ORAL
Qty: 90 | Refills: 1 | Status: DISCONTINUED | COMMUNITY
Start: 2019-06-27 | End: 2022-03-02

## 2022-03-02 RX ORDER — GABAPENTIN 100 MG/1
100 CAPSULE ORAL
Qty: 90 | Refills: 0 | Status: DISCONTINUED | COMMUNITY
Start: 2019-06-27 | End: 2022-03-02

## 2022-03-02 RX ORDER — METHOCARBAMOL 500 MG/1
500 TABLET, FILM COATED ORAL EVERY 6 HOURS
Qty: 15 | Refills: 0 | Status: DISCONTINUED | COMMUNITY
Start: 2019-08-28 | End: 2022-03-02

## 2022-03-02 RX ORDER — DOXYCYCLINE HYCLATE 150 MG/1
150 TABLET, DELAYED RELEASE ORAL
Refills: 0 | Status: DISCONTINUED | COMMUNITY
End: 2022-03-02

## 2022-03-02 RX ORDER — METHYLPREDNISOLONE 4 MG/1
4 TABLET ORAL
Qty: 1 | Refills: 0 | Status: DISCONTINUED | COMMUNITY
Start: 2020-01-06 | End: 2022-03-02

## 2022-03-15 ENCOUNTER — OUTPATIENT (OUTPATIENT)
Dept: INPATIENT UNIT | Facility: HOSPITAL | Age: 57
LOS: 1 days | Discharge: HOME | End: 2022-03-15
Payer: COMMERCIAL

## 2022-03-15 ENCOUNTER — APPOINTMENT (OUTPATIENT)
Dept: NEUROSURGERY | Facility: HOSPITAL | Age: 57
End: 2022-03-15

## 2022-03-15 ENCOUNTER — RESULT REVIEW (OUTPATIENT)
Age: 57
End: 2022-03-15

## 2022-03-15 VITALS
TEMPERATURE: 97 F | DIASTOLIC BLOOD PRESSURE: 90 MMHG | HEIGHT: 71 IN | RESPIRATION RATE: 16 BRPM | HEART RATE: 84 BPM | WEIGHT: 192.02 LBS | SYSTOLIC BLOOD PRESSURE: 125 MMHG | OXYGEN SATURATION: 99 %

## 2022-03-15 DIAGNOSIS — C90.00 MULTIPLE MYELOMA NOT HAVING ACHIEVED REMISSION: ICD-10-CM

## 2022-03-15 DIAGNOSIS — Z87.891 PERSONAL HISTORY OF NICOTINE DEPENDENCE: ICD-10-CM

## 2022-03-15 DIAGNOSIS — M48.07 SPINAL STENOSIS, LUMBOSACRAL REGION: ICD-10-CM

## 2022-03-15 DIAGNOSIS — M51.36 OTHER INTERVERTEBRAL DISC DEGENERATION, LUMBAR REGION: ICD-10-CM

## 2022-03-15 DIAGNOSIS — L42 PITYRIASIS ROSEA: ICD-10-CM

## 2022-03-15 DIAGNOSIS — M51.16 INTERVERTEBRAL DISC DISORDERS WITH RADICULOPATHY, LUMBAR REGION: ICD-10-CM

## 2022-03-15 DIAGNOSIS — I34.0 NONRHEUMATIC MITRAL (VALVE) INSUFFICIENCY: ICD-10-CM

## 2022-03-15 DIAGNOSIS — E78.5 HYPERLIPIDEMIA, UNSPECIFIED: ICD-10-CM

## 2022-03-15 DIAGNOSIS — I10 ESSENTIAL (PRIMARY) HYPERTENSION: ICD-10-CM

## 2022-03-15 DIAGNOSIS — I48.0 PAROXYSMAL ATRIAL FIBRILLATION: ICD-10-CM

## 2022-03-15 PROBLEM — N39.41 URGE INCONTINENCE OF URINE: Status: ACTIVE | Noted: 2022-03-15

## 2022-03-15 PROBLEM — R35.0 URINARY FREQUENCY: Status: ACTIVE | Noted: 2022-03-15

## 2022-03-15 PROCEDURE — 76000 FLUOROSCOPY <1 HR PHYS/QHP: CPT

## 2022-03-15 PROCEDURE — 97161 PT EVAL LOW COMPLEX 20 MIN: CPT | Mod: GP

## 2022-03-15 PROCEDURE — 97116 GAIT TRAINING THERAPY: CPT | Mod: GP

## 2022-03-15 PROCEDURE — 86901 BLOOD TYPING SEROLOGIC RH(D): CPT

## 2022-03-15 PROCEDURE — C1889: CPT

## 2022-03-15 PROCEDURE — C9399: CPT

## 2022-03-15 PROCEDURE — 86850 RBC ANTIBODY SCREEN: CPT

## 2022-03-15 PROCEDURE — 85025 COMPLETE CBC W/AUTO DIFF WBC: CPT

## 2022-03-15 PROCEDURE — 36415 COLL VENOUS BLD VENIPUNCTURE: CPT

## 2022-03-15 PROCEDURE — 63030 LAMOT DCMPRN NRV RT 1 LMBR: CPT | Mod: RT

## 2022-03-15 PROCEDURE — 80048 BASIC METABOLIC PNL TOTAL CA: CPT

## 2022-03-15 PROCEDURE — 88304 TISSUE EXAM BY PATHOLOGIST: CPT

## 2022-03-15 PROCEDURE — 82962 GLUCOSE BLOOD TEST: CPT

## 2022-03-15 PROCEDURE — 86900 BLOOD TYPING SEROLOGIC ABO: CPT

## 2022-03-15 PROCEDURE — 88304 TISSUE EXAM BY PATHOLOGIST: CPT | Mod: 26

## 2022-03-15 PROCEDURE — 63030 LAMOT DCMPRN NRV RT 1 LMBR: CPT | Mod: AS,RT

## 2022-03-15 RX ORDER — CYCLOBENZAPRINE HYDROCHLORIDE 10 MG/1
5 TABLET, FILM COATED ORAL THREE TIMES A DAY
Refills: 0 | Status: DISCONTINUED | OUTPATIENT
Start: 2022-03-15 | End: 2022-03-16

## 2022-03-15 RX ORDER — SARECYCLINE HYDROCHLORIDE 60 MG/1
1 TABLET, COATED ORAL
Qty: 0 | Refills: 0 | DISCHARGE

## 2022-03-15 RX ORDER — SODIUM CHLORIDE 9 MG/ML
1000 INJECTION, SOLUTION INTRAVENOUS
Refills: 0 | Status: DISCONTINUED | OUTPATIENT
Start: 2022-03-15 | End: 2022-03-15

## 2022-03-15 RX ORDER — ALPRAZOLAM 0.25 MG
1 TABLET ORAL
Qty: 0 | Refills: 0 | DISCHARGE

## 2022-03-15 RX ORDER — ESCITALOPRAM OXALATE 10 MG/1
10 TABLET, FILM COATED ORAL DAILY
Refills: 0 | Status: DISCONTINUED | OUTPATIENT
Start: 2022-03-15 | End: 2022-03-16

## 2022-03-15 RX ORDER — ATORVASTATIN CALCIUM 80 MG/1
1 TABLET, FILM COATED ORAL
Qty: 0 | Refills: 0 | DISCHARGE

## 2022-03-15 RX ORDER — ACYCLOVIR SODIUM 500 MG
2 VIAL (EA) INTRAVENOUS
Qty: 0 | Refills: 0 | DISCHARGE

## 2022-03-15 RX ORDER — ACETAMINOPHEN 500 MG
2 TABLET ORAL
Qty: 0 | Refills: 0 | DISCHARGE

## 2022-03-15 RX ORDER — ATORVASTATIN CALCIUM 80 MG/1
40 TABLET, FILM COATED ORAL AT BEDTIME
Refills: 0 | Status: DISCONTINUED | OUTPATIENT
Start: 2022-03-15 | End: 2022-03-16

## 2022-03-15 RX ORDER — OXYCODONE HYDROCHLORIDE 5 MG/1
5 TABLET ORAL ONCE
Refills: 0 | Status: DISCONTINUED | OUTPATIENT
Start: 2022-03-15 | End: 2022-03-15

## 2022-03-15 RX ORDER — ACETAMINOPHEN 500 MG
650 TABLET ORAL EVERY 6 HOURS
Refills: 0 | Status: DISCONTINUED | OUTPATIENT
Start: 2022-03-15 | End: 2022-03-16

## 2022-03-15 RX ORDER — ALPRAZOLAM 0.25 MG
0.25 TABLET ORAL AT BEDTIME
Refills: 0 | Status: DISCONTINUED | OUTPATIENT
Start: 2022-03-15 | End: 2022-03-16

## 2022-03-15 RX ORDER — METOPROLOL TARTRATE 50 MG
0.5 TABLET ORAL
Qty: 0 | Refills: 0 | DISCHARGE

## 2022-03-15 RX ORDER — OXYCODONE HYDROCHLORIDE 5 MG/1
5 TABLET ORAL EVERY 4 HOURS
Refills: 0 | Status: DISCONTINUED | OUTPATIENT
Start: 2022-03-15 | End: 2022-03-16

## 2022-03-15 RX ORDER — ONDANSETRON 8 MG/1
4 TABLET, FILM COATED ORAL ONCE
Refills: 0 | Status: DISCONTINUED | OUTPATIENT
Start: 2022-03-15 | End: 2022-03-15

## 2022-03-15 RX ORDER — FENTANYL CITRATE 50 UG/ML
50 INJECTION INTRAVENOUS
Refills: 0 | Status: DISCONTINUED | OUTPATIENT
Start: 2022-03-15 | End: 2022-03-15

## 2022-03-15 RX ORDER — METOPROLOL TARTRATE 50 MG
12.5 TABLET ORAL DAILY
Refills: 0 | Status: DISCONTINUED | OUTPATIENT
Start: 2022-03-15 | End: 2022-03-16

## 2022-03-15 RX ORDER — ACYCLOVIR SODIUM 500 MG
400 VIAL (EA) INTRAVENOUS DAILY
Refills: 0 | Status: DISCONTINUED | OUTPATIENT
Start: 2022-03-15 | End: 2022-03-16

## 2022-03-15 RX ORDER — ESCITALOPRAM OXALATE 10 MG/1
1 TABLET, FILM COATED ORAL
Qty: 0 | Refills: 0 | DISCHARGE

## 2022-03-15 RX ADMIN — Medication 0.25 MILLIGRAM(S): at 22:35

## 2022-03-15 RX ADMIN — ATORVASTATIN CALCIUM 40 MILLIGRAM(S): 80 TABLET, FILM COATED ORAL at 22:35

## 2022-03-15 RX ADMIN — Medication 24 MILLIGRAM(S): at 22:36

## 2022-03-15 NOTE — BRIEF OPERATIVE NOTE - NSICDXBRIEFPOSTOP_GEN_ALL_CORE_FT
POST-OP DIAGNOSIS:  Lumbar disc prolapse with compression radiculopathy 15-Mar-2022 18:46:38  Jeffry Beverly

## 2022-03-15 NOTE — BRIEF OPERATIVE NOTE - NSICDXBRIEFPROCEDURE_GEN_ALL_CORE_FT
PROCEDURES:  Microdiscectomy, spine, lumbar, open 15-Mar-2022 18:46:06  Jeffry Beverly   Medical Necessity Clause: This procedure was medically necessary because the lesion that was treated was:

## 2022-03-15 NOTE — ASU PATIENT PROFILE, ADULT - FALL HARM RISK - UNIVERSAL INTERVENTIONS
Bed in lowest position, wheels locked, appropriate side rails in place/Call bell, personal items and telephone in reach/Instruct patient to call for assistance before getting out of bed or chair/Non-slip footwear when patient is out of bed/[place] to call system/Physically safe environment - no spills, clutter or unnecessary equipment/Purposeful Proactive Rounding/Room/bathroom lighting operational, light cord in reach

## 2022-03-15 NOTE — ASU PREOP CHECKLIST - LAST DOSE WITHIN LAST 24HRS
Okay, but we need to get the information about which physical therapy  she is going to see so that we can put in the referral  
Yes

## 2022-03-15 NOTE — BRIEF OPERATIVE NOTE - NSICDXBRIEFPREOP_GEN_ALL_CORE_FT
PRE-OP DIAGNOSIS:  Lumbar disc prolapse with compression radiculopathy 15-Mar-2022 18:46:21  Jeffry Beverly

## 2022-03-16 ENCOUNTER — TRANSCRIPTION ENCOUNTER (OUTPATIENT)
Age: 57
End: 2022-03-16

## 2022-03-16 VITALS
OXYGEN SATURATION: 93 % | RESPIRATION RATE: 17 BRPM | TEMPERATURE: 98 F | HEART RATE: 105 BPM | DIASTOLIC BLOOD PRESSURE: 82 MMHG | SYSTOLIC BLOOD PRESSURE: 109 MMHG

## 2022-03-16 LAB
ANION GAP SERPL CALC-SCNC: 5 MMOL/L — SIGNIFICANT CHANGE UP (ref 5–17)
BASOPHILS # BLD AUTO: 0.01 K/UL — SIGNIFICANT CHANGE UP (ref 0–0.2)
BASOPHILS NFR BLD AUTO: 0.1 % — SIGNIFICANT CHANGE UP (ref 0–2)
BUN SERPL-MCNC: 15 MG/DL — SIGNIFICANT CHANGE UP (ref 7–23)
CALCIUM SERPL-MCNC: 9.2 MG/DL — SIGNIFICANT CHANGE UP (ref 8.5–10.1)
CHLORIDE SERPL-SCNC: 105 MMOL/L — SIGNIFICANT CHANGE UP (ref 96–108)
CO2 SERPL-SCNC: 28 MMOL/L — SIGNIFICANT CHANGE UP (ref 22–31)
CREAT SERPL-MCNC: 1.38 MG/DL — HIGH (ref 0.5–1.3)
EGFR: 59 ML/MIN/1.73M2 — LOW
EOSINOPHIL # BLD AUTO: 0 K/UL — SIGNIFICANT CHANGE UP (ref 0–0.5)
EOSINOPHIL NFR BLD AUTO: 0 % — SIGNIFICANT CHANGE UP (ref 0–6)
GLUCOSE SERPL-MCNC: 203 MG/DL — HIGH (ref 70–99)
HCT VFR BLD CALC: 43.7 % — SIGNIFICANT CHANGE UP (ref 39–50)
HGB BLD-MCNC: 14.5 G/DL — SIGNIFICANT CHANGE UP (ref 13–17)
IMM GRANULOCYTES NFR BLD AUTO: 0.4 % — SIGNIFICANT CHANGE UP (ref 0–1.5)
LYMPHOCYTES # BLD AUTO: 0.54 K/UL — LOW (ref 1–3.3)
LYMPHOCYTES # BLD AUTO: 3.4 % — LOW (ref 13–44)
MCHC RBC-ENTMCNC: 33.1 PG — SIGNIFICANT CHANGE UP (ref 27–34)
MCHC RBC-ENTMCNC: 33.2 GM/DL — SIGNIFICANT CHANGE UP (ref 32–36)
MCV RBC AUTO: 99.8 FL — SIGNIFICANT CHANGE UP (ref 80–100)
MONOCYTES # BLD AUTO: 0.19 K/UL — SIGNIFICANT CHANGE UP (ref 0–0.9)
MONOCYTES NFR BLD AUTO: 1.2 % — LOW (ref 2–14)
NEUTROPHILS # BLD AUTO: 15.18 K/UL — HIGH (ref 1.8–7.4)
NEUTROPHILS NFR BLD AUTO: 94.9 % — HIGH (ref 43–77)
PLATELET # BLD AUTO: 187 K/UL — SIGNIFICANT CHANGE UP (ref 150–400)
POTASSIUM SERPL-MCNC: 5.1 MMOL/L — SIGNIFICANT CHANGE UP (ref 3.5–5.3)
POTASSIUM SERPL-SCNC: 5.1 MMOL/L — SIGNIFICANT CHANGE UP (ref 3.5–5.3)
RBC # BLD: 4.38 M/UL — SIGNIFICANT CHANGE UP (ref 4.2–5.8)
RBC # FLD: 12.6 % — SIGNIFICANT CHANGE UP (ref 10.3–14.5)
SODIUM SERPL-SCNC: 138 MMOL/L — SIGNIFICANT CHANGE UP (ref 135–145)
WBC # BLD: 15.98 K/UL — HIGH (ref 3.8–10.5)
WBC # FLD AUTO: 15.98 K/UL — HIGH (ref 3.8–10.5)

## 2022-03-16 RX ORDER — OXYCODONE HYDROCHLORIDE 5 MG/1
0.5 TABLET ORAL
Qty: 0 | Refills: 0 | DISCHARGE

## 2022-03-16 RX ORDER — ASPIRIN/CALCIUM CARB/MAGNESIUM 324 MG
1 TABLET ORAL
Qty: 0 | Refills: 0 | DISCHARGE

## 2022-03-16 RX ORDER — OXYCODONE HYDROCHLORIDE 5 MG/1
1 TABLET ORAL
Qty: 30 | Refills: 0
Start: 2022-03-16 | End: 2022-03-22

## 2022-03-16 RX ORDER — ACETAMINOPHEN 500 MG
1000 TABLET ORAL ONCE
Refills: 0 | Status: COMPLETED | OUTPATIENT
Start: 2022-03-16 | End: 2022-03-16

## 2022-03-16 RX ORDER — MUPIROCIN 20 MG/G
1 OINTMENT TOPICAL
Qty: 0 | Refills: 0 | DISCHARGE

## 2022-03-16 RX ORDER — CYCLOBENZAPRINE HYDROCHLORIDE 10 MG/1
1 TABLET, FILM COATED ORAL
Qty: 42 | Refills: 0
Start: 2022-03-16 | End: 2022-03-29

## 2022-03-16 RX ADMIN — Medication 1000 MILLIGRAM(S): at 06:08

## 2022-03-16 RX ADMIN — Medication 400 MILLIGRAM(S): at 06:08

## 2022-03-16 RX ADMIN — ESCITALOPRAM OXALATE 10 MILLIGRAM(S): 10 TABLET, FILM COATED ORAL at 10:21

## 2022-03-16 RX ADMIN — Medication 400 MILLIGRAM(S): at 10:21

## 2022-03-16 RX ADMIN — Medication 12.5 MILLIGRAM(S): at 10:21

## 2022-03-16 RX ADMIN — Medication 4 MILLIGRAM(S): at 06:08

## 2022-03-16 NOTE — DISCHARGE NOTE PROVIDER - CARE PROVIDER_API CALL
Jeffry Beverly; PhD)  Neurosurgery  [address] [facility], [address] [place] [address]  Phone: [phone]  Fax: [phone]  Follow Up Time: 2 weeks

## 2022-03-16 NOTE — DISCHARGE NOTE PROVIDER - NSDCCPCAREPLAN_GEN_ALL_CORE_FT
PRINCIPAL DISCHARGE DIAGNOSIS  Diagnosis: Herniation of lumbar intervertebral disc with radiculopathy  Assessment and Plan of Treatment: Follow up with Dr. Beverly in 2 weeks. Call the office, or visit the nearest ER if you experience redness, swelling, drainage from your wound, fevers >101.3F, sudden weakness of the legs. You may shower, keep waterproof dressing on for 5 more days. Then you may remove and get wound wet, wash with mild soap, rinse and pat dry. Leave open to air. No tub baths, no swimming do not submerge in water. No bending, lifting, twisting of the back. Do not lift anything heavier than 10lbs, Do not drive under the influence of narcotic medication.

## 2022-03-16 NOTE — DISCHARGE NOTE PROVIDER - NSDCFUSCHEDAPPT_GEN_ALL_CORE_FT
PABLO SINGH ; 03/22/2022 ; [facility] Cardio [address] ; 03/23/2022 ; [facility] IntMed [address] ; 03/30/2022 ; [facility] Rahat  PABLO Velasquez ; 06/06/2022 ; [facility] Rad Nucmed [address] ; 06/06/2022 ; [facility] Rad Nucmed [address]

## 2022-03-16 NOTE — DISCHARGE NOTE NURSING/CASE MANAGEMENT/SOCIAL WORK - PATIENT PORTAL LINK FT
You can access the FollowMyHealth Patient Portal offered by [facility] by registering at the following website: [email or web address]. By joining [facility] FollowMyHealth portal, you will also be able to view your health information using other applications (apps) compatible with our system.

## 2022-03-16 NOTE — PHYSICAL THERAPY INITIAL EVALUATION ADULT - MODALITIES TREATMENT COMMENTS
Pt educated on spinal precautions, falls risk reduction, therex review and the overall impact on the pt's ADLs and safety. Pt demonstrates baseline level of independence across all functional mobility and does not require further skilled PT intervention while at .

## 2022-03-16 NOTE — DISCHARGE NOTE NURSING/CASE MANAGEMENT/SOCIAL WORK - NSDCPEFALRISK_GEN_ALL_CORE
For information on Fall & Injury Prevention, visit: [email or web address] OR  [email or web address] OR  https://www.cdc.gov/steadi/patient.html

## 2022-03-16 NOTE — DISCHARGE NOTE PROVIDER - NSDCCPTREATMENT_GEN_ALL_CORE_FT
PRINCIPAL PROCEDURE  Procedure: Microdiscectomy, spine, lumbar, open  Findings and Treatment:

## 2022-03-16 NOTE — DISCHARGE NOTE PROVIDER - NSDCMRMEDTOKEN_GEN_ALL_CORE_FT
acyclovir 200 mg oral capsule: 2 cap(s) orally once a day  ALPRAZolam 0.5 mg oral tablet: 1 tab(s) orally once a day (at bedtime)  aspirin 81 mg oral delayed release tablet: 1 tab(s) orally once a day  Resume tomorrow 3/17  atorvastatin 40 mg oral tablet: 1 tab(s) orally once a day  Caltrate 600 + D oral tablet: 1 tab(s) orally once a day  cyclobenzaprine 5 mg oral tablet: 1 tab(s) orally 3 times a day, As needed, Muscle Spasm  escitalopram 10 mg oral tablet: 1 tab(s) orally once a day  metoprolol succinate 25 mg oral tablet, extended release: 0.5 tab(s) orally once a day  oxyCODONE 5 mg oral tablet: 1 tab(s) orally every 4 to 6 hours, As Needed -for severe pain MDD:6  Seysara 100 mg oral tablet: 1 tab(s) orally once a day  Tylenol 500 mg oral tablet: 2 tab(s) orally every 6 hours, As Needed

## 2022-03-16 NOTE — DISCHARGE NOTE PROVIDER - HOSPITAL COURSE
Patient is a 58 y/o male with a PMHx of myeloma not on immunotherapy, MR, presents to  for scheduled procedure. He underwent L4-L5 microdiscectomy without complication. Postop overnight complained of restlessness, unable to sleep. Tachycardia, related to postop pain and anxiety now resolved. Steroids discontinued this morning as patient has had previous reactions including insomnia and anxiety in the past with use. Dressing clean/dry/intact was replaced overnight. Patient is voiding, tolerating diet. Ambulating without issue. Patient is cleared from neurosurgical standpoint for discharge home.

## 2022-03-16 NOTE — PHYSICAL THERAPY INITIAL EVALUATION ADULT - GENERAL OBSERVATIONS, REHAB EVAL
Pt rec'd ambulating independently in room with wife present, pt pleasant and cooperative with PT, no acute complaints, but does endorse some soreness in his right buttock.

## 2022-03-16 NOTE — CHART NOTE - NSCHARTNOTEFT_GEN_A_CORE
called by RN noted pt to have dressing off, and pt tachy with , pt denies any cp, sob, dizziness, headache. Pt tolerating PO well, voiding well.   pt seen and examined,   pt in NAD, skin warm and dry   incision steri strips intact c/d, clean dry dressing applied   TALAMANTES x4 antigravity in the bed, strong thru out   Pt given IV tylenol for pain will cont to observe HR

## 2022-03-17 ENCOUNTER — APPOINTMENT (OUTPATIENT)
Dept: INTERNAL MEDICINE | Facility: CLINIC | Age: 57
End: 2022-03-17

## 2022-03-18 ENCOUNTER — APPOINTMENT (OUTPATIENT)
Dept: CARDIOLOGY | Facility: CLINIC | Age: 57
End: 2022-03-18

## 2022-03-22 ENCOUNTER — NON-APPOINTMENT (OUTPATIENT)
Age: 57
End: 2022-03-22

## 2022-03-22 ENCOUNTER — APPOINTMENT (OUTPATIENT)
Dept: CARDIOLOGY | Facility: CLINIC | Age: 57
End: 2022-03-22
Payer: COMMERCIAL

## 2022-03-22 VITALS
HEIGHT: 70 IN | DIASTOLIC BLOOD PRESSURE: 79 MMHG | TEMPERATURE: 97.9 F | HEART RATE: 89 BPM | WEIGHT: 195 LBS | BODY MASS INDEX: 27.92 KG/M2 | SYSTOLIC BLOOD PRESSURE: 116 MMHG | OXYGEN SATURATION: 97 %

## 2022-03-22 DIAGNOSIS — E80.4 GILBERT SYNDROME: ICD-10-CM

## 2022-03-22 PROCEDURE — 93000 ELECTROCARDIOGRAM COMPLETE: CPT

## 2022-03-22 PROCEDURE — 99204 OFFICE O/P NEW MOD 45 MIN: CPT

## 2022-03-22 NOTE — HISTORY OF PRESENT ILLNESS
[FreeTextEntry1] : Previous Cardiologist:Dr. Rahul Alvarenga\par \par 57M hx of myeloma, MVP with ruptured chordae s/p MV/TV repair 11/30/2021, post-op Afib, HFrEF EF 40% who presents to establish cardiac care due to change in insurance\par \par MM 5/2019 in remission since 1/2021- previously on immunotherapy with ?cardiac complications\par \par His GDMT was being optimized until recently when he had hypotension during hospitalization for multifocal pneumonia. Also underwent back surgery 3/2022\par \par Pt is doing well, has been more active.\par Denies any active cardiac sx.\par \par \par 12/16/21:\par Chol 118/TG 87/HDL 37/LDL 81

## 2022-03-22 NOTE — CARDIOLOGY SUMMARY
[de-identified] : 3/22/2022: SR, nonspecific twave flattening [de-identified] : 3/4/20: SR, PVC 2.96% [de-identified] : 3/18/20: No ischemia. 12 minutes [de-identified] : 3/11/2021: \par EF 40%, s/p MVR,TVR

## 2022-03-22 NOTE — DISCUSSION/SUMMARY
[FreeTextEntry1] : HFrEF EF 40%, NYHA Class I\par hx of MV/TV repair\par MM\par \par -cont asa 81mg daily\par -IE ppx is indicated and was reviewed\par -increase metoprolol succinate to 50mg daily. will increase as tolerated in two weeks (pt previously tolerating 75mg once daily)\par -as part of GDMT will likely add losartan 12.5mg daily as BP may tolerate\par -given ongoing immunotherapy (different from initial regimen) he will require routine surveillance TTE's\par will reach out in two week by phone

## 2022-03-23 ENCOUNTER — APPOINTMENT (OUTPATIENT)
Dept: INTERNAL MEDICINE | Facility: CLINIC | Age: 57
End: 2022-03-23
Payer: COMMERCIAL

## 2022-03-23 VITALS
BODY MASS INDEX: 27.77 KG/M2 | WEIGHT: 194 LBS | HEIGHT: 70 IN | RESPIRATION RATE: 14 BRPM | TEMPERATURE: 97.7 F | DIASTOLIC BLOOD PRESSURE: 71 MMHG | HEART RATE: 92 BPM | OXYGEN SATURATION: 97 % | SYSTOLIC BLOOD PRESSURE: 109 MMHG

## 2022-03-23 DIAGNOSIS — Z80.0 FAMILY HISTORY OF MALIGNANT NEOPLASM OF DIGESTIVE ORGANS: ICD-10-CM

## 2022-03-23 DIAGNOSIS — Z87.891 PERSONAL HISTORY OF NICOTINE DEPENDENCE: ICD-10-CM

## 2022-03-23 DIAGNOSIS — I48.0 PAROXYSMAL ATRIAL FIBRILLATION: ICD-10-CM

## 2022-03-23 DIAGNOSIS — Z82.49 FAMILY HISTORY OF ISCHEMIC HEART DISEASE AND OTHER DISEASES OF THE CIRCULATORY SYSTEM: ICD-10-CM

## 2022-03-23 DIAGNOSIS — L73.9 FOLLICULAR DISORDER, UNSPECIFIED: ICD-10-CM

## 2022-03-23 DIAGNOSIS — Z80.42 FAMILY HISTORY OF MALIGNANT NEOPLASM OF PROSTATE: ICD-10-CM

## 2022-03-23 DIAGNOSIS — Z83.438 FAMILY HISTORY OF OTHER DISORDER OF LIPOPROTEIN METABOLISM AND OTHER LIPIDEMIA: ICD-10-CM

## 2022-03-23 DIAGNOSIS — M48.07 SPINAL STENOSIS, LUMBOSACRAL REGION: ICD-10-CM

## 2022-03-23 PROCEDURE — 99214 OFFICE O/P EST MOD 30 MIN: CPT

## 2022-03-23 RX ORDER — ASPIRIN ENTERIC COATED TABLETS 81 MG 81 MG/1
81 TABLET, DELAYED RELEASE ORAL
Refills: 0 | Status: ACTIVE | COMMUNITY

## 2022-03-23 RX ORDER — OXYCODONE 5 MG/1
5 TABLET ORAL
Qty: 30 | Refills: 0 | Status: DISCONTINUED | COMMUNITY
Start: 2022-03-16

## 2022-03-23 NOTE — HEALTH RISK ASSESSMENT
[Former] : Former [No] : In the past 12 months have you used drugs other than those required for medical reasons? No [1] : 2) Feeling down, depressed, or hopeless for several days (1) [PHQ-2 Negative - No further assessment needed] : PHQ-2 Negative - No further assessment needed [YearQuit] : 1988 [Audit-CScore] : 0 [FLQ5Ulxtj] : 2

## 2022-03-23 NOTE — PLAN
[FreeTextEntry1] : Renew meds. I-STOP checked. Reviewed medical hx. Pt advised to sign up for [facility] portal to review labs and communicate any questions or concerns directly. Yearly physical and return as needed for illness, medication refills, and new or existing complaints. CPE to schedule.

## 2022-03-23 NOTE — HISTORY OF PRESENT ILLNESS
[FreeTextEntry8] : 57 year M with PMH MM, CHF, HLD, and anxiety presents to establish care. Needs medication refills. Pt denies CP/SOB, fever/chills, n/v/d/c.

## 2022-03-28 ENCOUNTER — APPOINTMENT (OUTPATIENT)
Dept: NEUROSURGERY | Facility: CLINIC | Age: 57
End: 2022-03-28
Payer: COMMERCIAL

## 2022-03-28 ENCOUNTER — OUTPATIENT (OUTPATIENT)
Dept: OUTPATIENT SERVICES | Facility: HOSPITAL | Age: 57
LOS: 1 days | Discharge: HOME | End: 2022-03-28

## 2022-03-28 VITALS
TEMPERATURE: 96 F | DIASTOLIC BLOOD PRESSURE: 86 MMHG | BODY MASS INDEX: 27.63 KG/M2 | WEIGHT: 193 LBS | OXYGEN SATURATION: 98 % | SYSTOLIC BLOOD PRESSURE: 122 MMHG | HEIGHT: 70 IN | HEART RATE: 83 BPM

## 2022-03-28 DIAGNOSIS — C90.00 MULTIPLE MYELOMA NOT HAVING ACHIEVED REMISSION: ICD-10-CM

## 2022-03-28 PROCEDURE — 99024 POSTOP FOLLOW-UP VISIT: CPT

## 2022-03-28 NOTE — CONSULT LETTER
[Dear  ___] : Dear  [unfilled], [Courtesy Letter:] : I had the pleasure of seeing your patient, [unfilled], in my office today. [Sincerely,] : Sincerely, [FreeTextEntry2] : John Francis MD\par [address]\[place] [address] \par  \par  [FreeTextEntry1] : Samm Marie is a 57-year-old male who presents today for evaluation.  On 3/15/2022 patient underwent L4/5 microdiscectomy for lumbar disc prolapse with compression radiculopathy.  Patient reports he has noted improvement in his symptoms.  Patient reports lower back ache and discomfort.  He reports urinary urgency has improved.  He reports  pain in his right buttocks and right hamstring however right leg shooting pains have resolved.  He denies numbness or tingling.  He denies bowel dysfunction or saddle anesthesia.  He denies any difficulties with walking.  Patient denies fever or chills.  He denies any redness, drainage, swelling to incision site or surrounding skin.  There is no imaging to review with the patient.  Patient is alert and oriented.  No distress noted.  Steri-Strips removed without difficulties.  Incision has healed well.  There is no redness, drainage, swelling noted.  No fever or chills noted.  Strength to bilateral legs 5/5.  Negative clonus.  Reflexes to lower extremity present and equal.  Sensation to light touch to lower extremities equal and normal.  Patient is walking well.  I provided the patient with a refill for gabapentin.  Patient should continue with this for another 4 weeks.  Patient may initiate physical therapy in 1 week.  We will follow-up in office in 4 weeks.  Patient will call office to schedule appointment.  Patient is aware to call with any further questions or concerns or with any new or worsening symptoms. [FreeTextEntry3] : Jaqueline Bragg, MSN, FNP-BC\par Nurse Practitioner\par Neurosurgery\par [address] [place] [address], [address]nd floor \[place] [address] \par Office: [phone] \par Fax: [phone]\par \par

## 2022-03-30 ENCOUNTER — RESULT REVIEW (OUTPATIENT)
Age: 57
End: 2022-03-30

## 2022-03-30 ENCOUNTER — NON-APPOINTMENT (OUTPATIENT)
Age: 57
End: 2022-03-30

## 2022-03-30 ENCOUNTER — APPOINTMENT (OUTPATIENT)
Dept: HEMATOLOGY ONCOLOGY | Facility: CLINIC | Age: 57
End: 2022-03-30
Payer: COMMERCIAL

## 2022-03-30 ENCOUNTER — OUTPATIENT (OUTPATIENT)
Dept: OUTPATIENT SERVICES | Facility: HOSPITAL | Age: 57
LOS: 1 days | End: 2022-03-30
Payer: COMMERCIAL

## 2022-03-30 VITALS
DIASTOLIC BLOOD PRESSURE: 78 MMHG | HEIGHT: 69.49 IN | HEART RATE: 82 BPM | WEIGHT: 195.11 LBS | SYSTOLIC BLOOD PRESSURE: 121 MMHG | OXYGEN SATURATION: 96 % | BODY MASS INDEX: 28.25 KG/M2 | TEMPERATURE: 98.1 F | RESPIRATION RATE: 16 BRPM

## 2022-03-30 DIAGNOSIS — C90.01 MULTIPLE MYELOMA IN REMISSION: ICD-10-CM

## 2022-03-30 LAB
BASOPHILS # BLD AUTO: 0.02 K/UL — SIGNIFICANT CHANGE UP (ref 0–0.2)
BASOPHILS NFR BLD AUTO: 0.3 % — SIGNIFICANT CHANGE UP (ref 0–2)
EOSINOPHIL # BLD AUTO: 0.1 K/UL — SIGNIFICANT CHANGE UP (ref 0–0.5)
EOSINOPHIL NFR BLD AUTO: 1.4 % — SIGNIFICANT CHANGE UP (ref 0–6)
HCT VFR BLD CALC: 41.3 % — SIGNIFICANT CHANGE UP (ref 39–50)
HGB BLD-MCNC: 14.3 G/DL — SIGNIFICANT CHANGE UP (ref 13–17)
IMM GRANULOCYTES NFR BLD AUTO: 1.4 % — SIGNIFICANT CHANGE UP (ref 0–1.5)
LYMPHOCYTES # BLD AUTO: 1.19 K/UL — SIGNIFICANT CHANGE UP (ref 1–3.3)
LYMPHOCYTES # BLD AUTO: 16.8 % — SIGNIFICANT CHANGE UP (ref 13–44)
MCHC RBC-ENTMCNC: 33.7 PG — SIGNIFICANT CHANGE UP (ref 27–34)
MCHC RBC-ENTMCNC: 34.6 G/DL — SIGNIFICANT CHANGE UP (ref 32–36)
MCV RBC AUTO: 97.4 FL — SIGNIFICANT CHANGE UP (ref 80–100)
MONOCYTES # BLD AUTO: 0.54 K/UL — SIGNIFICANT CHANGE UP (ref 0–0.9)
MONOCYTES NFR BLD AUTO: 7.6 % — SIGNIFICANT CHANGE UP (ref 2–14)
NEUTROPHILS # BLD AUTO: 5.13 K/UL — SIGNIFICANT CHANGE UP (ref 1.8–7.4)
NEUTROPHILS NFR BLD AUTO: 72.5 % — SIGNIFICANT CHANGE UP (ref 43–77)
NRBC # BLD: 0 /100 WBCS — SIGNIFICANT CHANGE UP (ref 0–0)
PLATELET # BLD AUTO: 209 K/UL — SIGNIFICANT CHANGE UP (ref 150–400)
RBC # BLD: 4.24 M/UL — SIGNIFICANT CHANGE UP (ref 4.2–5.8)
RBC # FLD: 12.5 % — SIGNIFICANT CHANGE UP (ref 10.3–14.5)
WBC # BLD: 7.08 K/UL — SIGNIFICANT CHANGE UP (ref 3.8–10.5)
WBC # FLD AUTO: 7.08 K/UL — SIGNIFICANT CHANGE UP (ref 3.8–10.5)

## 2022-03-30 PROCEDURE — 99205 OFFICE O/P NEW HI 60 MIN: CPT

## 2022-03-30 PROCEDURE — 86900 BLOOD TYPING SEROLOGIC ABO: CPT

## 2022-03-30 PROCEDURE — 86850 RBC ANTIBODY SCREEN: CPT

## 2022-03-30 PROCEDURE — 86905 BLOOD TYPING RBC ANTIGENS: CPT

## 2022-03-30 PROCEDURE — 86901 BLOOD TYPING SEROLOGIC RH(D): CPT

## 2022-04-04 ENCOUNTER — TRANSCRIPTION ENCOUNTER (OUTPATIENT)
Age: 57
End: 2022-04-04

## 2022-04-08 ENCOUNTER — LABORATORY RESULT (OUTPATIENT)
Age: 57
End: 2022-04-08

## 2022-04-08 ENCOUNTER — APPOINTMENT (OUTPATIENT)
Dept: HEMATOLOGY ONCOLOGY | Facility: CLINIC | Age: 57
End: 2022-04-08
Payer: COMMERCIAL

## 2022-04-08 ENCOUNTER — RESULT REVIEW (OUTPATIENT)
Age: 57
End: 2022-04-08

## 2022-04-08 VITALS
HEIGHT: 69.5 IN | BODY MASS INDEX: 28.23 KG/M2 | OXYGEN SATURATION: 98 % | WEIGHT: 195 LBS | SYSTOLIC BLOOD PRESSURE: 113 MMHG | TEMPERATURE: 97.7 F | RESPIRATION RATE: 17 BRPM | HEART RATE: 78 BPM | DIASTOLIC BLOOD PRESSURE: 75 MMHG

## 2022-04-08 LAB
ALBUMIN MFR SERPL ELPH: 64.8 %
ALBUMIN SERPL ELPH-MCNC: 4.8 G/DL
ALBUMIN SERPL-MCNC: 4.5 G/DL
ALBUMIN/GLOB SERPL: 1.8 RATIO
ALBUPE: 18.6 %
ALP BLD-CCNC: 130 U/L
ALPHA1 GLOB MFR SERPL ELPH: 4.7 %
ALPHA1 GLOB SERPL ELPH-MCNC: 0.3 G/DL
ALPHA1UPE: 35.1 %
ALPHA2 GLOB MFR SERPL ELPH: 10 %
ALPHA2 GLOB SERPL ELPH-MCNC: 0.7 G/DL
ALPHA2UPE: 19.3 %
ALT SERPL-CCNC: 21 U/L
ANION GAP SERPL CALC-SCNC: 12 MMOL/L
AST SERPL-CCNC: 21 U/L
B-GLOBULIN MFR SERPL ELPH: 11 %
B-GLOBULIN SERPL ELPH-MCNC: 0.8 G/DL
B2 MICROGLOB SERPL-MCNC: 1.5 MG/L
BASOPHILS # BLD AUTO: 0.01 K/UL — SIGNIFICANT CHANGE UP (ref 0–0.2)
BASOPHILS NFR BLD AUTO: 0.2 % — SIGNIFICANT CHANGE UP (ref 0–2)
BENCE JONES EXCRETION: 0 MG/24HR
BETAUPE: 13.9 %
BILIRUB SERPL-MCNC: 1.8 MG/DL
BUN SERPL-MCNC: 12 MG/DL
CALCIUM SERPL-MCNC: 9.8 MG/DL
CHLORIDE SERPL-SCNC: 104 MMOL/L
CO2 SERPL-SCNC: 28 MMOL/L
CREAT 24H UR-MCNC: 2.2 G/24 H
CREAT 24H UR-MCNC: 2.2 G/24 H
CREAT ?TM UR-MCNC: 124 MG/DL
CREAT SERPL-MCNC: 1.09 MG/DL
CREATININE UR (MAYO): 124 MG/DL
DEPRECATED KAPPA LC FREE/LAMBDA SER: 1.83 RATIO
EGFR: 79 ML/MIN/1.73M2
EOSINOPHIL # BLD AUTO: 0.05 K/UL — SIGNIFICANT CHANGE UP (ref 0–0.5)
EOSINOPHIL NFR BLD AUTO: 1 % — SIGNIFICANT CHANGE UP (ref 0–6)
GAMMA GLOB FLD ELPH-MCNC: 0.7 G/DL
GAMMA GLOB MFR SERPL ELPH: 9.5 %
GAMMAUPE: 13.1 %
GLUCOSE SERPL-MCNC: 100 MG/DL
HCT VFR BLD CALC: 40.3 % — SIGNIFICANT CHANGE UP (ref 39–50)
HGB BLD-MCNC: 13.8 G/DL — SIGNIFICANT CHANGE UP (ref 13–17)
IGA 24H UR QL IFE: NORMAL
IGA SER QL IEP: 76 MG/DL
IGG SER QL IEP: 800 MG/DL
IGM SER QL IEP: 30 MG/DL
IMM GRANULOCYTES NFR BLD AUTO: 0.6 % — SIGNIFICANT CHANGE UP (ref 0–1.5)
INTERPRETATION SERPL IEP-IMP: NORMAL
KAPPA LC 24H UR QL: 0 MG/DL
KAPPA LC CSF-MCNC: 0.29 MG/DL
KAPPA LC SERPL-MCNC: 0.53 MG/DL
LDH SERPL-CCNC: 184 U/L
LYMPHOCYTES # BLD AUTO: 0.86 K/UL — LOW (ref 1–3.3)
LYMPHOCYTES # BLD AUTO: 16.8 % — SIGNIFICANT CHANGE UP (ref 13–44)
M PROTEIN 24H MFR UR ELPH: 0 %
M PROTEIN SPEC IFE-MCNC: NORMAL
MCHC RBC-ENTMCNC: 33.5 PG — SIGNIFICANT CHANGE UP (ref 27–34)
MCHC RBC-ENTMCNC: 34.2 G/DL — SIGNIFICANT CHANGE UP (ref 32–36)
MCV RBC AUTO: 97.8 FL — SIGNIFICANT CHANGE UP (ref 80–100)
MONOCYTES # BLD AUTO: 0.63 K/UL — SIGNIFICANT CHANGE UP (ref 0–0.9)
MONOCYTES NFR BLD AUTO: 12.3 % — SIGNIFICANT CHANGE UP (ref 2–14)
NEUTROPHILS # BLD AUTO: 3.54 K/UL — SIGNIFICANT CHANGE UP (ref 1.8–7.4)
NEUTROPHILS NFR BLD AUTO: 69.1 % — SIGNIFICANT CHANGE UP (ref 43–77)
NRBC # BLD: 0 /100 WBCS — SIGNIFICANT CHANGE UP (ref 0–0)
PLATELET # BLD AUTO: 146 K/UL — LOW (ref 150–400)
POTASSIUM SERPL-SCNC: 5.2 MMOL/L
PROT 24H UR-MRATE: 6 MG/DL
PROT ?TM UR-MCNC: 24 HR
PROT ?TM UR-MCNC: 24 HR
PROT PATTERN 24H UR ELPH-IMP: NORMAL
PROT SERPL-MCNC: 6.8 G/DL
PROT SERPL-MCNC: 7 G/DL
PROT SERPL-MCNC: 7 G/DL
PROT UR-MCNC: 105 MG/24 H
PROT UR-MCNC: 7 MG/DL
PROT UR-MCNC: 7 MG/DL
RBC # BLD: 4.12 M/UL — LOW (ref 4.2–5.8)
RBC # FLD: 12.6 % — SIGNIFICANT CHANGE UP (ref 10.3–14.5)
SODIUM SERPL-SCNC: 145 MMOL/L
SPECIMEN VOL 24H UR: 1750 ML
SPECIMEN VOL 24H UR: 1750 ML
U PROTEIN QNT CALCULATION: 122 MG/24 H
WBC # BLD: 5.12 K/UL — SIGNIFICANT CHANGE UP (ref 3.8–10.5)
WBC # FLD AUTO: 5.12 K/UL — SIGNIFICANT CHANGE UP (ref 3.8–10.5)

## 2022-04-08 PROCEDURE — 38222 DX BONE MARROW BX & ASPIR: CPT | Mod: RT

## 2022-04-08 NOTE — PROCEDURE
[Bone Marrow Biopsy] : bone marrow biopsy [Bone Marrow Aspiration] : bone marrow aspiration  [Patient] : the patient [Verbal Consent Obtained] : verbal consent was obtained prior to the procedure [Patient identification verified] : patient identification verified [Procedure verified and consent obtained] : procedure verified and consent obtained [Laterality verified and correct site marked] : laterality verified and correct site marked [Right] : site: right [Correct positioning] : correct positioning [Prone] : prone [Superior iliac spine was identified] : the superior iliac spine was identified. [The right posterior iliac crest was prepped with betadine and draped, using sterile technique.] : The right posterior iliac crest was prepped with betadine and draped, using sterile technique. [Lidocaine was injected and into the periosteum overlying the site.] : Lidocaine was injected and into the periosteum overlying the site. [Aspirate] : aspirate [Cytogenetics] : cytogenetics [FISH] : FISH [Biopsy] : biopsy [Flow Cytometry] : flow cytometry [] : The patient was instructed to remove the bandage the following AM. The patient may bathe. Acetaminophen may be taken for discomfort, as per package directions.If there are any other problems, the patient was instructed to call the office. The patient verbalized understanding, and is aware of the office contact numbers. [FreeTextEntry1] : History of multiple myeloma [FreeTextEntry2] : 9 cc of lidocaine was used for the procedure.\par \par WBC: 5.12\par Hgb: 13.8\par Hct: 40.3\par Plts: 146\par \par Bone marrow aspiration and biopsy were done. MM panel, congo red stain, and clonoseq sent\par On ASA, extra pressure (>20min) applied post-procedure.\par Clonoseq tracking #: [id number]

## 2022-04-08 NOTE — REASON FOR VISIT
[Bone Marrow Biopsy] : bone marrow biopsy [Bone Marrow Aspiration] : bone marrow aspiration [FreeTextEntry2] : History of multiple myeloma

## 2022-04-10 NOTE — ADDENDUM
[FreeTextEntry1] : All medical record entries made by the Scribe were at my, Dr. Hong, direction and personally dictated by me on 03/30/2022. I have reviewed the chart and agree that the record accurately reflects my personal performance of the history, physical exam, assessment and plan.  I have also personally directed, reviewed, and agreed with the chart.\par

## 2022-04-10 NOTE — HISTORY OF PRESENT ILLNESS
[Disease:__________________________] : Disease: [unfilled] [Treatment Protocol] : Treatment Protocol [de-identified] : Mr Marie is a 58 yo male here for further management of his multiple myeloma diagnosed initially in 2019. \par \par He was initially started on Revlimid, Velcade and Dexamethasone with a community oncologist in 2019. He reports he developed eye styes from Velcade and subsequently discontinued the medication. He may have had a brief time on Kyprolis with Rev and Dex, however he developed severe back spasms on the Revlimid and this led to its discotninuation. He also had insomnia while taking steroids. He switched his care over to [facility] in Oct 2019.\par \par In January 2020 he had a BM biopsy, he was found to have a CR while being seen by Dr Mian Chapin at [facility], who advised against autologous stem cell transplantation in first CR, he then underwent stem cell collection (frozen at a cryobank at [facility]) and he was subsequently started on off-label therapy with Ninlaro and Venetoclax maintenance (still in the front-line setting). 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 His maintenance therapy was discontinued Nov 10, 2021 and has yet to be restarted. Since achieving a CR in 2020, imaging with PET-CT and Bone marrow biopsies up to this point have been negative for residual disease and lesions. His last BM biopsy was in June 2021 at [facility].He did have clonoseq testing for the ID sample, but has yet to have any MRD measurements. He was on Ninlaro 3 mg once weekly for 3/4 weeks every 28 day cycle and venetoclax was taken at 400 mg PO 5/7 days a week of every 28 day cycle.\par \par He reports that he had back surgery in March 2022. He was having back pain which is when they discovered he has herniated disks L4 and L5 and underwent microdiscectomy on 3/15/22. He has had a change in insurance to [employer], which is no longer covered at [facility] and he came to [facility] [place] to discuss further management.\par \par Today he overall feels well. He has not experienced any changes in his health since his back surgery. He is currently not on any myeloma-directed therapy. He denies any new bone pain, fevers, chills, night sweats, neuropathic pain, urinary / kidney issues.\par \par Treatment Hx:\par VRd - unspecified number of cycles\par KRd? - unclear\par Ninlaro + Venetoclax (last dose 11/10/2021) [de-identified] : slides requested [FreeTextEntry1] : Off treatment [de-identified] : 3/30/22: Initial visit.\par \par ____\par A comprehensive review of systems was performed including constitutional, eyes, ENT, cardiovascular, respiratory, gastrointestinal, genitourinary, musculoskeletal, integumentary, neurological, psychiatric and hematologic / lymphatic. All pertinent positives are included in the H&P under interval history above and the remaining review of systems listed are negative.

## 2022-04-10 NOTE — ASSESSMENT
[FreeTextEntry1] : 58 yo male with Multiple Myeloma (Dx 2019) currently in a CR at last assessment, here to transfer his care due to insurance coverage. He has been off all maintenance therapy since 11/10/2021.\par \par Plan:\par - Contact Dr. Mian Chapin [phone]\par - Bone marrow biopsy and aspirate with myeloma panel and clonoseq MRD testing\par - Will discuss if whether to restart medications, there may not be a need if MRD negative, will discuss with Dr Chapin\par - UPEP 24 hour\par - RTC 1 month\par \par ___\par I personally have spent a total of 60 minutes of time on the date of this encounter reviewing test results, documenting findings, providing education, coordinating care and directly consulting with the patient and/or designated family member.

## 2022-04-15 ENCOUNTER — TRANSCRIPTION ENCOUNTER (OUTPATIENT)
Age: 57
End: 2022-04-15

## 2022-04-25 ENCOUNTER — APPOINTMENT (OUTPATIENT)
Dept: HEMATOLOGY ONCOLOGY | Facility: CLINIC | Age: 57
End: 2022-04-25

## 2022-04-27 ENCOUNTER — APPOINTMENT (OUTPATIENT)
Dept: HEMATOLOGY ONCOLOGY | Facility: CLINIC | Age: 57
End: 2022-04-27
Payer: COMMERCIAL

## 2022-04-27 ENCOUNTER — RESULT REVIEW (OUTPATIENT)
Age: 57
End: 2022-04-27

## 2022-04-27 ENCOUNTER — APPOINTMENT (OUTPATIENT)
Dept: NEUROSURGERY | Facility: CLINIC | Age: 57
End: 2022-04-27
Payer: COMMERCIAL

## 2022-04-27 VITALS
BODY MASS INDEX: 28.63 KG/M2 | DIASTOLIC BLOOD PRESSURE: 73 MMHG | WEIGHT: 200 LBS | SYSTOLIC BLOOD PRESSURE: 132 MMHG | TEMPERATURE: 97.7 F | OXYGEN SATURATION: 96 % | HEIGHT: 70 IN | HEART RATE: 83 BPM

## 2022-04-27 VITALS
TEMPERATURE: 97.5 F | WEIGHT: 200.62 LBS | HEART RATE: 72 BPM | OXYGEN SATURATION: 98 % | RESPIRATION RATE: 18 BRPM | DIASTOLIC BLOOD PRESSURE: 89 MMHG | BODY MASS INDEX: 29.2 KG/M2 | SYSTOLIC BLOOD PRESSURE: 132 MMHG

## 2022-04-27 DIAGNOSIS — M51.36 OTHER INTERVERTEBRAL DISC DEGENERATION, LUMBAR REGION: ICD-10-CM

## 2022-04-27 LAB
BASOPHILS # BLD AUTO: 0.01 K/UL — SIGNIFICANT CHANGE UP (ref 0–0.2)
BASOPHILS NFR BLD AUTO: 0.2 % — SIGNIFICANT CHANGE UP (ref 0–2)
EOSINOPHIL # BLD AUTO: 0.06 K/UL — SIGNIFICANT CHANGE UP (ref 0–0.5)
EOSINOPHIL NFR BLD AUTO: 1.1 % — SIGNIFICANT CHANGE UP (ref 0–6)
HCT VFR BLD CALC: 43.3 % — SIGNIFICANT CHANGE UP (ref 39–50)
HGB BLD-MCNC: 14.3 G/DL — SIGNIFICANT CHANGE UP (ref 13–17)
IMM GRANULOCYTES NFR BLD AUTO: 0.5 % — SIGNIFICANT CHANGE UP (ref 0–1.5)
LYMPHOCYTES # BLD AUTO: 0.92 K/UL — LOW (ref 1–3.3)
LYMPHOCYTES # BLD AUTO: 16.3 % — SIGNIFICANT CHANGE UP (ref 13–44)
MCHC RBC-ENTMCNC: 33 G/DL — SIGNIFICANT CHANGE UP (ref 32–36)
MCHC RBC-ENTMCNC: 34 PG — SIGNIFICANT CHANGE UP (ref 27–34)
MCV RBC AUTO: 102.9 FL — HIGH (ref 80–100)
MONOCYTES # BLD AUTO: 0.52 K/UL — SIGNIFICANT CHANGE UP (ref 0–0.9)
MONOCYTES NFR BLD AUTO: 9.2 % — SIGNIFICANT CHANGE UP (ref 2–14)
NEUTROPHILS # BLD AUTO: 4.11 K/UL — SIGNIFICANT CHANGE UP (ref 1.8–7.4)
NEUTROPHILS NFR BLD AUTO: 72.7 % — SIGNIFICANT CHANGE UP (ref 43–77)
NRBC # BLD: 0 /100 WBCS — SIGNIFICANT CHANGE UP (ref 0–0)
PLATELET # BLD AUTO: 166 K/UL — SIGNIFICANT CHANGE UP (ref 150–400)
RBC # BLD: 4.21 M/UL — SIGNIFICANT CHANGE UP (ref 4.2–5.8)
RBC # FLD: 12.6 % — SIGNIFICANT CHANGE UP (ref 10.3–14.5)
WBC # BLD: 5.65 K/UL — SIGNIFICANT CHANGE UP (ref 3.8–10.5)
WBC # FLD AUTO: 5.65 K/UL — SIGNIFICANT CHANGE UP (ref 3.8–10.5)

## 2022-04-27 PROCEDURE — 99024 POSTOP FOLLOW-UP VISIT: CPT

## 2022-04-27 PROCEDURE — 99213 OFFICE O/P EST LOW 20 MIN: CPT

## 2022-04-28 ENCOUNTER — NON-APPOINTMENT (OUTPATIENT)
Age: 57
End: 2022-04-28

## 2022-04-28 PROBLEM — M51.36 DISC DEGENERATION, LUMBAR: Status: ACTIVE | Noted: 2019-06-27

## 2022-04-28 NOTE — CONSULT LETTER
[Dear  ___] : Dear  [unfilled], [Courtesy Letter:] : I had the pleasure of seeing your patient, [unfilled], in my office today. [Sincerely,] : Sincerely, [FreeTextEntry2] : John Francis MD\par [facility] [address]\[place] [address]  [FreeTextEntry1] : This very pleasant 57-year-old gentleman is seen in the office for a postop follow up visit after undergoing a L4 L5 microdiscectomy for a lumbar disc prolapse.   Initially, the patient has an important background history of a diagnosis of multiple myeloma which is currently in remission and is not undergoing any current immunotherapy or maintenance therapy.  He also underwent heart valve repair surgery in November 2021.  He has a longstanding history of back pain going back to early 2019 and preoperatively had suffered from a sharp lower back pain with shooting and burning pain down both legs posteriorly right greater than left.  Neuro-imaging of the lumbar spine which identified a very large extrusion of disc at L4-5 consistent with the patient's symptoms.  In the past, he had experienced several episodes of incontinence.  \par \par Today, the patient is now 6 weeks post op and the pain in his right buttock is now completely resolved.  He no longer has any radicular leg pain.  The patient  is reporting good strength of his lower extremities.  No longer is he taking Gabapentin.  He continues to have bladder urgency and no incontinence.  The lumbar incision is well healed and no signs of erythema, edema or drainage.  Skin intact.  \par \par The patient is alert and oriented.  The patellar reflexes are symmetric and normal but the right ankle reflex is absent and the left ankle reflex is significantly diminished.  The patient also has decreased sensation to light touch on the lateral calf and top of the foot on the right-hand side.    Right sided dropfoot with mild dorsiflexion weakness, otherwise 5/5 strength of LE.  Walking independently.  \par \par No images to review at today visit. \par \par MR. Marie has recovered extremely well and I am pleased to see he has complete resolution of his pain.  We discussed body mechanics and safe ways to protect his lower back muscles.  Ongoing physical rehab was recommended to include strengthening and core stretching modalities would be of benefit as well as developing a lower back maintenance program.    Unfortunately, at this time, this patient no longer has medical insurance and is not working so we reviewed ways to continue physical rehab at home and a self driven program.    The incision is well healed.  The patient will return to the office if his symptoms reoccur, for now he will be seen on a PRN  basis.      \par \par Please do not hesitate to call me if you have any concerns or questions regarding this evaluation, the patient's diagnosis of a large disc extrusion at L4-5, surgical intervention, or the recommended proposed plan of care.   [FreeTextEntry3] : Kimberly Lombardo, DNP, NP\par Nurse Practitioner\par Neurosurgery and Spine\par [facility]\par Assistant \par [employer]\par and Physician Assistant Studies\par email: [email or web address] [address] [place] [address]\[place]  [address]\par P- [phone]\par F- [phone]\par

## 2022-04-30 ENCOUNTER — APPOINTMENT (OUTPATIENT)
Dept: NUCLEAR MEDICINE | Facility: CLINIC | Age: 57
End: 2022-04-30
Payer: COMMERCIAL

## 2022-04-30 ENCOUNTER — OUTPATIENT (OUTPATIENT)
Dept: OUTPATIENT SERVICES | Facility: HOSPITAL | Age: 57
LOS: 1 days | End: 2022-04-30
Payer: COMMERCIAL

## 2022-04-30 DIAGNOSIS — C90.00 MULTIPLE MYELOMA NOT HAVING ACHIEVED REMISSION: ICD-10-CM

## 2022-04-30 PROCEDURE — 78816 PET IMAGE W/CT FULL BODY: CPT

## 2022-04-30 PROCEDURE — A9552: CPT

## 2022-04-30 PROCEDURE — 78816 PET IMAGE W/CT FULL BODY: CPT | Mod: 26,PS

## 2022-05-01 NOTE — HISTORY OF PRESENT ILLNESS
[Disease:__________________________] : Disease: [unfilled] [Treatment Protocol] : Treatment Protocol [de-identified] : slides requested [de-identified] : 3/30/22: Initial visit.\par \par 4/27/22: Follow up. \par ____\par A comprehensive review of systems was performed including constitutional, eyes, ENT, cardiovascular, respiratory, gastrointestinal, genitourinary, musculoskeletal, integumentary, neurological, psychiatric and hematologic / lymphatic. All pertinent positives are included in the H&P under interval history above and the remaining review of systems listed are negative.  [de-identified] : =====================================================\par Treatment Hx:\par \par VRd - unspecified number of cycles\par KRd? - unclear\par Ninlaro + Venetoclax (last dose 11/10/2021) [FreeTextEntry1] : Off treatment

## 2022-05-01 NOTE — ASSESSMENT
[FreeTextEntry1] : 58 yo male with Multiple Myeloma (Dx 2019) currently in a CR at last assessment, here to transfer his care due to insurance coverage. He has been off all maintenance therapy since 11/10/2021.\par \par SPEP and 24-hour UPEP were negative for monoclonal protein. Clonoseq testing on BM biopsy showed a population of 1/3 clones below the limit of detection. Plan for PET-CT this month.\par \par Plan:\par - Discussed Clonoseq results\par - Hold treatment for now\par - Will discuss with Dr. Mian Chapin \par - Bone marrow biopsy and aspirate with myeloma panel and clonoseq MRD testing\par - Will discuss if whether to restart medications, given clonoseq results will discuss with Dr Chapin [phone]\par - UPEP 24 hour\par - RTC 1 month\par \par ___\par I personally have spent a total of 25 minutes of time on the date of this encounter reviewing test results, documenting findings, providing education, coordinating care and directly consulting with the patient and/or designated family member.

## 2022-05-04 LAB
ALBUMIN MFR SERPL ELPH: 66.5 %
ALBUMIN SERPL ELPH-MCNC: 4.6 G/DL
ALBUMIN SERPL-MCNC: 4.4 G/DL
ALBUMIN/GLOB SERPL: 2 RATIO
ALP BLD-CCNC: 129 U/L
ALPHA1 GLOB MFR SERPL ELPH: 4 %
ALPHA1 GLOB SERPL ELPH-MCNC: 0.3 G/DL
ALPHA2 GLOB MFR SERPL ELPH: 9.6 %
ALPHA2 GLOB SERPL ELPH-MCNC: 0.6 G/DL
ALT SERPL-CCNC: 23 U/L
ANION GAP SERPL CALC-SCNC: 12 MMOL/L
AST SERPL-CCNC: 41 U/L
B-GLOBULIN MFR SERPL ELPH: 10.4 %
B-GLOBULIN SERPL ELPH-MCNC: 0.7 G/DL
B2 MICROGLOB SERPL-MCNC: 1.6 MG/L
BILIRUB SERPL-MCNC: 2.3 MG/DL
BUN SERPL-MCNC: 14 MG/DL
CALCIUM SERPL-MCNC: 9.3 MG/DL
CHLORIDE SERPL-SCNC: 104 MMOL/L
CO2 SERPL-SCNC: 26 MMOL/L
CREAT SERPL-MCNC: 0.98 MG/DL
DEPRECATED KAPPA LC FREE/LAMBDA SER: 1.72 RATIO
EGFR: 89 ML/MIN/1.73M2
GAMMA GLOB FLD ELPH-MCNC: 0.6 G/DL
GAMMA GLOB MFR SERPL ELPH: 9.5 %
GLUCOSE SERPL-MCNC: 74 MG/DL
IGA SER QL IEP: 71 MG/DL
IGG SER QL IEP: 668 MG/DL
IGM SER QL IEP: 28 MG/DL
INTERPRETATION SERPL IEP-IMP: NORMAL
KAPPA LC CSF-MCNC: 0.32 MG/DL
KAPPA LC SERPL-MCNC: 0.55 MG/DL
M PROTEIN SPEC IFE-MCNC: NORMAL
POTASSIUM SERPL-SCNC: 4.8 MMOL/L
PROT SERPL-MCNC: 6.6 G/DL
SODIUM SERPL-SCNC: 143 MMOL/L

## 2022-05-06 ENCOUNTER — OUTPATIENT (OUTPATIENT)
Dept: OUTPATIENT SERVICES | Facility: HOSPITAL | Age: 57
LOS: 1 days | Discharge: HOME | End: 2022-05-06
Payer: COMMERCIAL

## 2022-05-06 DIAGNOSIS — C90.00 MULTIPLE MYELOMA NOT HAVING ACHIEVED REMISSION: ICD-10-CM

## 2022-05-09 ENCOUNTER — RESULT REVIEW (OUTPATIENT)
Age: 57
End: 2022-05-09

## 2022-05-09 ENCOUNTER — TRANSCRIPTION ENCOUNTER (OUTPATIENT)
Age: 57
End: 2022-05-09

## 2022-05-09 PROCEDURE — 88321 CONSLTJ&REPRT SLD PREP ELSWR: CPT

## 2022-05-18 ENCOUNTER — MED ADMIN CHARGE (OUTPATIENT)
Age: 57
End: 2022-05-18

## 2022-05-18 ENCOUNTER — APPOINTMENT (OUTPATIENT)
Dept: INTERNAL MEDICINE | Facility: CLINIC | Age: 57
End: 2022-05-18
Payer: COMMERCIAL

## 2022-05-18 VITALS
BODY MASS INDEX: 27.92 KG/M2 | SYSTOLIC BLOOD PRESSURE: 122 MMHG | HEART RATE: 75 BPM | TEMPERATURE: 98 F | HEIGHT: 70 IN | WEIGHT: 195 LBS | DIASTOLIC BLOOD PRESSURE: 87 MMHG | OXYGEN SATURATION: 97 %

## 2022-05-18 DIAGNOSIS — J30.2 OTHER SEASONAL ALLERGIC RHINITIS: ICD-10-CM

## 2022-05-18 LAB — SURGICAL PATHOLOGY STUDY: SIGNIFICANT CHANGE UP

## 2022-05-18 PROCEDURE — 90471 IMMUNIZATION ADMIN: CPT

## 2022-05-18 PROCEDURE — 90677 PCV20 VACCINE IM: CPT

## 2022-05-18 PROCEDURE — 36415 COLL VENOUS BLD VENIPUNCTURE: CPT

## 2022-05-18 PROCEDURE — 99396 PREV VISIT EST AGE 40-64: CPT | Mod: 25

## 2022-05-18 RX ORDER — ESCITALOPRAM OXALATE 10 MG/1
10 TABLET ORAL
Qty: 90 | Refills: 1 | Status: DISCONTINUED | COMMUNITY
End: 2022-05-18

## 2022-05-18 RX ORDER — ALPRAZOLAM 0.5 MG/1
0.5 TABLET ORAL TWICE DAILY
Qty: 60 | Refills: 0 | Status: DISCONTINUED | COMMUNITY
End: 2022-05-18

## 2022-05-18 NOTE — HISTORY OF PRESENT ILLNESS
[de-identified] : 57 year M with PMH MM in remission, HLD, and new seasonal allergies presents for CPE. He is requesting pneumonia vaccine. Pt denies CP/SOB, fever/chills, n/v/d/c.

## 2022-05-18 NOTE — HEALTH RISK ASSESSMENT
[Good] : ~his/her~  mood as  good [Former] : Former [Yes] : Yes [Monthly or less (1 pt)] : Monthly or less (1 point) [1 or 2 (0 pts)] : 1 or 2 (0 points) [Never (0 pts)] : Never (0 points) [No] : In the past 12 months have you used drugs other than those required for medical reasons? No [0] : 2) Feeling down, depressed, or hopeless: Not at all (0) [PHQ-2 Negative - No further assessment needed] : PHQ-2 Negative - No further assessment needed [YearQuit] : 1988 [Audit-CScore] : 1 [[id number]] : 0

## 2022-05-19 ENCOUNTER — TRANSCRIPTION ENCOUNTER (OUTPATIENT)
Age: 57
End: 2022-05-19

## 2022-05-19 LAB
APPEARANCE: CLEAR
BACTERIA: NEGATIVE
BILIRUBIN URINE: NEGATIVE
BLOOD URINE: NEGATIVE
CHOLEST SERPL-MCNC: 144 MG/DL
COLOR: NORMAL
ESTIMATED AVERAGE GLUCOSE: 100 MG/DL
GLUCOSE QUALITATIVE U: NEGATIVE
HBA1C MFR BLD HPLC: 5.1 %
HDLC SERPL-MCNC: 50 MG/DL
HYALINE CASTS: 0 /LPF
KETONES URINE: NEGATIVE
LDLC SERPL CALC-MCNC: 73 MG/DL
LEUKOCYTE ESTERASE URINE: NEGATIVE
MICROSCOPIC-UA: NORMAL
NITRITE URINE: NEGATIVE
NONHDLC SERPL-MCNC: 93 MG/DL
PH URINE: 6
PROTEIN URINE: NEGATIVE
PSA SERPL-MCNC: 3.46 NG/ML
RED BLOOD CELLS URINE: 0 /HPF
SPECIFIC GRAVITY URINE: 1.01
SQUAMOUS EPITHELIAL CELLS: 0 /HPF
TRIGL SERPL-MCNC: 104 MG/DL
TSH SERPL-ACNC: 1.63 UIU/ML
UROBILINOGEN URINE: NORMAL
WHITE BLOOD CELLS URINE: 0 /HPF

## 2022-05-23 ENCOUNTER — TRANSCRIPTION ENCOUNTER (OUTPATIENT)
Age: 57
End: 2022-05-23

## 2022-05-25 ENCOUNTER — RESULT REVIEW (OUTPATIENT)
Age: 57
End: 2022-05-25

## 2022-05-25 ENCOUNTER — APPOINTMENT (OUTPATIENT)
Dept: HEMATOLOGY ONCOLOGY | Facility: CLINIC | Age: 57
End: 2022-05-25
Payer: COMMERCIAL

## 2022-05-25 ENCOUNTER — NON-APPOINTMENT (OUTPATIENT)
Age: 57
End: 2022-05-25

## 2022-05-25 ENCOUNTER — TRANSCRIPTION ENCOUNTER (OUTPATIENT)
Age: 57
End: 2022-05-25

## 2022-05-25 VITALS
WEIGHT: 198.39 LBS | HEIGHT: 70.47 IN | BODY MASS INDEX: 28.09 KG/M2 | SYSTOLIC BLOOD PRESSURE: 108 MMHG | TEMPERATURE: 98.1 F | OXYGEN SATURATION: 98 % | HEART RATE: 78 BPM | DIASTOLIC BLOOD PRESSURE: 72 MMHG | RESPIRATION RATE: 18 BRPM

## 2022-05-25 LAB
BASOPHILS # BLD AUTO: 0 K/UL — SIGNIFICANT CHANGE UP (ref 0–0.2)
BASOPHILS NFR BLD AUTO: 0 % — SIGNIFICANT CHANGE UP (ref 0–2)
EOSINOPHIL # BLD AUTO: 0.04 K/UL — SIGNIFICANT CHANGE UP (ref 0–0.5)
EOSINOPHIL NFR BLD AUTO: 0.8 % — SIGNIFICANT CHANGE UP (ref 0–6)
HCT VFR BLD CALC: 50.1 % — HIGH (ref 39–50)
HGB BLD-MCNC: 17.1 G/DL — HIGH (ref 13–17)
IMM GRANULOCYTES NFR BLD AUTO: 0.2 % — SIGNIFICANT CHANGE UP (ref 0–1.5)
LYMPHOCYTES # BLD AUTO: 0.71 K/UL — LOW (ref 1–3.3)
LYMPHOCYTES # BLD AUTO: 13.7 % — SIGNIFICANT CHANGE UP (ref 13–44)
MCHC RBC-ENTMCNC: 34.1 G/DL — SIGNIFICANT CHANGE UP (ref 32–36)
MCHC RBC-ENTMCNC: 34.1 PG — HIGH (ref 27–34)
MCV RBC AUTO: 100 FL — SIGNIFICANT CHANGE UP (ref 80–100)
MONOCYTES # BLD AUTO: 0.41 K/UL — SIGNIFICANT CHANGE UP (ref 0–0.9)
MONOCYTES NFR BLD AUTO: 7.9 % — SIGNIFICANT CHANGE UP (ref 2–14)
NEUTROPHILS # BLD AUTO: 4.02 K/UL — SIGNIFICANT CHANGE UP (ref 1.8–7.4)
NEUTROPHILS NFR BLD AUTO: 77.4 % — HIGH (ref 43–77)
NRBC # BLD: 0 /100 WBCS — SIGNIFICANT CHANGE UP (ref 0–0)
PLATELET # BLD AUTO: 151 K/UL — SIGNIFICANT CHANGE UP (ref 150–400)
RBC # BLD: 5.01 M/UL — SIGNIFICANT CHANGE UP (ref 4.2–5.8)
RBC # FLD: 11.6 % — SIGNIFICANT CHANGE UP (ref 10.3–14.5)
WBC # BLD: 5.19 K/UL — SIGNIFICANT CHANGE UP (ref 3.8–10.5)
WBC # FLD AUTO: 5.19 K/UL — SIGNIFICANT CHANGE UP (ref 3.8–10.5)

## 2022-05-25 PROCEDURE — 99215 OFFICE O/P EST HI 40 MIN: CPT

## 2022-05-25 RX ORDER — LOSARTAN POTASSIUM 25 MG/1
25 TABLET, FILM COATED ORAL DAILY
Qty: 15 | Refills: 5 | Status: DISCONTINUED | COMMUNITY
Start: 2022-05-23 | End: 2022-05-25

## 2022-06-06 ENCOUNTER — TRANSCRIPTION ENCOUNTER (OUTPATIENT)
Age: 57
End: 2022-06-06

## 2022-06-06 RX ORDER — LISINOPRIL 5 MG/1
5 TABLET ORAL
Qty: 30 | Refills: 5 | Status: DISCONTINUED | COMMUNITY
Start: 2022-05-25 | End: 2022-06-06

## 2022-06-07 ENCOUNTER — NON-APPOINTMENT (OUTPATIENT)
Age: 57
End: 2022-06-07

## 2022-06-07 ENCOUNTER — APPOINTMENT (OUTPATIENT)
Dept: ORTHOPEDIC SURGERY | Facility: CLINIC | Age: 57
End: 2022-06-07
Payer: COMMERCIAL

## 2022-06-07 DIAGNOSIS — M75.82 OTHER SHOULDER LESIONS, LEFT SHOULDER: ICD-10-CM

## 2022-06-07 DIAGNOSIS — M75.21 BICIPITAL TENDINITIS, RIGHT SHOULDER: ICD-10-CM

## 2022-06-07 PROCEDURE — 99204 OFFICE O/P NEW MOD 45 MIN: CPT | Mod: 25

## 2022-06-07 PROCEDURE — 73030 X-RAY EXAM OF SHOULDER: CPT | Mod: RT

## 2022-06-07 PROCEDURE — 20610 DRAIN/INJ JOINT/BURSA W/O US: CPT | Mod: RT

## 2022-06-08 NOTE — PROCEDURE
[de-identified] : Injection: Right (R) shoulder (biceps tendon sheath).\par Indication: Biceps tendinitis.\par \par A discussion was had with the patient regarding this procedure and all questions were answered. All risks, benefits and alternatives were discussed. These included but were not limited to bleeding, infection, and allergic reaction. Alcohol was used to clean the skin, and betadine was used to sterilize and prep the area in the anterior aspect of the shoulder. Ethyl chloride spray was then used as a topical anesthetic. A 22-gauge needle was used to inject 1cc of 1% Xylocaine, 1cc of 0.25% Bupivacaine and 1cc of 40mg/mL Triamcinolone Acetonide into the biceps tendon sheath. A sterile bandage was then applied. The patient tolerated the procedure well and there were no complications.

## 2022-06-08 NOTE — HISTORY OF PRESENT ILLNESS
[Stable] : stable [___ yrs] : [unfilled] year(s) ago [5] : a current pain level of 5/10 [7] : an average pain level of 7/10 [Lifting] : worsened by lifting [de-identified] : PABLO SINGH is a 57 year y/o male who presents for initial visit of Right (R) shoulder pain.  The patient reports he has had numerous medical issues over the last 4 to 5 years.  In 2017 he was experiencing shoulder and neck pain.  He had an MRI of his neck and of his right shoulder.  The MRI of his right shoulder showed a high-grade partial rotator cuff tear.  There was some evidence of biceps tendinitis without tearing.  The patient states his cervical spine took precedence and he had 3 epidurals and his pain resolved.  He has dealt with COVID as well as cardiac surgery over the last few years.  He had a lumbar discectomy 2 months ago.  He presents today after returning to the gym 3 to 4 months ago.  He has been gradually feeling worsening pain in both shoulders as well as multiple body parts.  He states his primary care doctor has worked him up in the past for rheumatoid arthritis and Lyme's disease.  He currently is not working.  He localizes the pain to the anterior right shoulder.  The left shoulder started bothering him to a lesser extent and may be compensatory.  He denies paresthesias in his hands. He presents with MRI. MRI reveals: \par \par Rotator cuff tendinosis with focal moderate to high-grade partial-thickness tear of the supraspinatus tendon.\par Low-grade partial thickness tear of the infraspinatus tendon.\par Biceps tendinosis and tenosynovitis without tear.\par Moderate to severe acromioclavicular joint osteoarthrosis with mild bursitis.\par Posterior and anterior labral tears.\par   [Exercise Regimen] : not relieved by exercise regimen

## 2022-06-08 NOTE — PHYSICAL EXAM
[de-identified] : Physical Exam: \par General: Well appearing, no acute distress \par Neurologic: A&Ox3, No focal deficits \par Head: NCAT without abrasions, lacerations, or ecchymosis to head, face, or scalp \par Eyes: No scleral icterus, no gross abnormalities \par Respiratory: Equal chest wall expansion bilaterally, no accessory muscle use \par Lymphatic: No lymphadenopathy palpated \par Skin: Warm and dry \par Psychiatric: Normal mood and affect\par \par Examination of the Right shoulder shows no obvious deformity, swelling or erythema. Mild tenderness to palpation over the anterior shoulder over the proximal biceps tendon. No AC joint tenderness. Mild TTP biceps groove. The patient demonstrates active/passive ROM of Forward Flexion to 170 degrees, External Rotation to 80 degrees and Internal Rotation to a mid lumbar level. The patient has a negative Jones and Neers test. No pain with cross body adduction, lift off testing, AC compression testing or Yergason testing. positive Obrein. negative both impingement signs. The patient has 4.5/5 strength to forward flexion with pronation, internal and external rotation, although hitching with abduction. Compartments are soft and nontender. The patient has 2+ cap refill and sensation is intact in the hand.\par \par Left shoulder shows no deformity. No tenderness to palpation over the biceps or AC joint. The patient has Forward Flexion to 170 degrees, External Rotation to 85 degrees and Internal Rotation to a mid lumbar level. 5/5 strength to forward flexion with pronation, internal and external rotation. Compartments are soft and nontender. 2+ cap refill. Sensation intact distally.  Negative Jones and Neer's test.\par  [de-identified] : The following radiographs were ordered and read by me during this patients visit. I reviewed each radiograph in detail with the patient and discussed the findings as highlighted below. \par \par 4 views of the Right (R) shoulder show no fracture, dislocation or bony lesions. There is evidence of degenerative change in the glenohumeral and acromioclavicular joints with mild to no narrowing of the joint space. Otherwise unremarkable.  There is a type II acromion present.\par \par Procedure: MRI of Right (R) shoulder \par Dated: 9/27/2017\par \par Impression:\par \par Rotator cuff tendinosis with focal moderate to high-grade partial-thickness tear of the supraspinatus tendon.\par Low-grade partial thickness tear of the infraspinatus tendon.\par Biceps tendinosis and tenosynovitis without tear.\par Moderate to severe acromioclavicular joint osteoarthrosis with mild bursitis.\par Posterior and anterior labral tears.\par

## 2022-06-08 NOTE — ADDENDUM
[FreeTextEntry1] : Documented by Ramon Ashraf acting as a scribe for Dr. Fletcher and Dez Mckeon PA-C on 06/07/2022. \par \par All medical record entries made by the Scribe were at my, Dr. Fletcher, and Dez Mckeon's, direction and\par personally dictated by me on 06/07/2022. I have reviewed the chart and agree that the record\par accurately reflects my personal performance of the history, physical exam, procedure and imaging.

## 2022-06-09 RX ORDER — VENETOCLAX 100 MG/1
100 TABLET, FILM COATED ORAL DAILY
Refills: 0 | Status: DISCONTINUED | COMMUNITY
End: 2022-06-09

## 2022-06-09 RX ORDER — IXAZOMIB 3 MG/1
3 CAPSULE ORAL
Refills: 0 | Status: DISCONTINUED | COMMUNITY
End: 2022-06-09

## 2022-06-09 NOTE — ASSESSMENT
[FreeTextEntry1] : 56 yo male with high risk IgA lambda Multiple Myeloma (Dx 2019) currently in a morphologic and serologic CR at last assessment here for further monitoring and management. He has been on 3 different treatment / maintenance regimens (including VRd, KRd, Venetoclax + Ninlaro) all in the front-line setting. 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 His last TTE in Feb 2022 showed heart failure with reduced ejection fraction at 39%. He has been off all maintenance therapy since 11/10/2021.\par \par During our first visit on 3/30/22 we ordered immunoelectrophoresis studies which did not detect a monoclonal protein or elevated FLC in either the serum or urine. A repeat PET-CT was performed on 4/30/22 which did not show any areas of disease. In addition we performed a BM biopsy and aspirate on 4/8/22 in which no morphologic, cytogenetic, or immunophenotypic features of plasma cell myeloma were identified thus showing continued CR. Clonoseq MRD testing was sent on the first 1.5 - 2 mL aspirate pull which showed the presence of a residual sequence below the limit of detection (">0-1 residual clonal cells per million nucleated cell"). Only 1 of the original 3 sequences was detected (IGH Sequence A). Sequences for IGK and IGL patient-specific mutations were not detected.\par \par Today I discussed the results of the above tests with Mr Marie. We also discussed that I had a brief telephone discussion with Dr Chapin regarding the next course of action in his care. Given his heart issues, which are being medically managed and optimized by cardiology), long interval off treatment (since 11/2021) and MRD below the limit of detection, holding treatment at this time may be appropriate with serial BM assessment and monitoring. Dr Chapin will continue to be involved in Mr Marie care and plans to see him at the end of June and will discuss options and risks vs benefits of further treatment in this setting in additional detail.\par \par Plan:\par - Repeat myeloma panel today\par - Asymptomatic increase in T-bili on CMP during last visits, will monitor, ? Gilbert syndrome\par - Will await discussion from Dr Chapin, however would prefer serial BM monitoring (peripheral blood has reduced sensitivity) with clonoseq MRD testing if he is to remain off treatment, with the next test in 6-12 months, however this is open to discussion as there is no standard to the frequency of this testing at this time\par - Healthcare Maintenance: COVID-19 vaccinated, Pneumonia vaccine status (in progress, recently received Prevnar 20), ? shingles vaccination\par - Send note, BM reports and PET-CT reports to Dr Chapin's Office fax [phone]  \par - Follow up in 2 months.\par \par ___\par I personally have spent a total of 45 minutes of time on the date of this encounter reviewing test results, documenting findings, providing education, coordinating care and directly consulting with the patient.

## 2022-06-09 NOTE — CONSULT LETTER
[Dear  ___] : Dear ~ARELY, [Courtesy Letter:] : I had the pleasure of seeing your patient, [unfilled], in my office today. [Please see my note below.] : Please see my note below. [Referral Closing:] : Thank you very much for seeing this patient.  If you have any questions, please do not hesitate to contact me. [Sincerely,] : Sincerely, [FreeTextEntry3] : Pranav Hong DO, MSc\par , [facility]\par Attending Physician - Malignant Hematology\par [facility]\par [address] [address]\par Tel: [phone]\par Fax: [phone]

## 2022-06-09 NOTE — RESULTS/DATA
[FreeTextEntry1] : =========================================================================\par  EXAM: [id number] - PETCT WB ONC FDG SUBS  - ORDERED BY: ASIF HARTMANN\par \par PROCEDURE DATE:  04/30/2022  \par  \par INTERPRETATION:  PROCEDURE:  PET/CT WHOLE BODY ([id number])\par \par CLINICAL INFORMATION: 57-year-old male diagnosed with multiple myeloma in \par 2019, status post therapy. PET/CT is done as part of subsequent treatment \par strategy evaluation.\par \par RADIOPHARMACEUTICAL:  11.64 mCi F-18, FDG, I.V.\par \par TECHNIQUE:  Fasting blood sugar measured prior to injection of \par radiopharmaceutical was 78 mg/dl. Following intravenous injection of the \par radiopharmaceutical and an uptake period of approximately 60 minutes, \par FDG-PET/CT imaging was performed on a Siemens Biograph mCT 64 PET-CT from \par vertex of the skull to the toes. Oral contrast was given during the \par uptake period. CT was performed during shallow respiration. The CT \par protocol was optimized for PET attenuation correction and anatomic \par localization. The CT protocol was not designed to produce and cannot \par replace state-of-the-art diagnostic CT images with specific imaging \par protocols for different body parts and indications. Images were reviewed \par on a dedicated workstation using multiplanar reconstruction.\par \par The standardized uptake values (SUV) are normalized to patient body \par weight and indicate the highest activity concentration (SUVmax) in a \par given disease site. All image numbers refer to axial image number.\par \par COMPARISON:  No prior PET/CT is available for prior PET/CT..\par \par OTHER STUDIES USED FOR CORRELATION: MRI of lumbar spine from 2/7/2022 and \par CT angiogram chest from 2/6/2022.\par \par FINDINGS:\par \par HEAD/NECK: Physiologic FDG activity in the brain, major salivary glands, \par and neck muscles.\par CHEST: No abnormal FDG activity. No lymphadenopathy.\par LUNGS: No abnormal FDG activity. No nodule.\par PLEURA/PERICARDIUM:Physiologic FDG activity. No pleural or pericardial \par effusion.\par \par HEPATOBILIARY/PANCREAS: Physiologic FDG activity. Liver SUV mean is 2.7.\par SPLEEN: Physiologic FDG activity. Normal in size.\par ADRENAL GLANDS: No abnormal FDG activity. No pleural or pericardial \par effusion.\par KIDNEYS/URINARY BLADDER: Physiologic excreted FDG activity.\par PELVIC ORGANS: No abnormal FDG activity.\par ABDOMINOPELVIC NODES: No enlarged or FDG avid lymph nodes.\par BOWEL/PERITONEUM/MESENTERY: No abnormal FDG activity.\par BONES/SOFT TISSUES:Nonspecific heterogeneous FDG activity in the spinous \par processes of L3 to L5 with no corresponding abnormalities on CT, and \par associated overlying soft tissue stranding (SUV 9.7; image 251).\par \par IMPRESSION:  FDG-PET/CT scan demonstrates:\par \par Nonspecific heterogeneous FDG activity in the spinous processes of L3-L5 without CT correlate, with associated mildly FDG avid overlying soft tissue stranding. This may be secondary to post traumatic changes. Please correlate clinically.\par \par Direct comparison will be performed and an addendum issued when prior PET/CT is made available.\par \par --- End of Report ---\par \par BEN LOYA MD; Attending Nuclear Medicine \par This document has been electronically signed. May  1 2022  2:57PM\par \par =========================================================================

## 2022-06-09 NOTE — HISTORY OF PRESENT ILLNESS
[Treatment Protocol] : Treatment Protocol [Disease:__________________________] : Disease: [unfilled] [Date: ____________] : Patient's last distress assessment performed on [unfilled]. 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 Fib / A. Flutter (post-surgical, s/p cardioversion, not on AC), lumbar radiculopathy (s/p lumbar microdiscectomy 3/15/22) here for further management of his high-risk (TP53 gene deletion, del13q, t(11;14)) IgA Lambda multiple myeloma initially diagnosed in 2019. He presents here as a transfer of care related to change in insurance from [facility] system where he was most recently followed by Dr Mian Chapin.\par \par He was diagnosed with IgA lambda multiple myeloma with anemia (no bone lesions - on skeletal survey, renal issues or hypercalcemia) in May 2019. He was started on Revlimid, Velcade and Dexamethasone (RVd) in June 2019 under the care of Dr Sharif Arteaga (oncologist associated with [facility]). He reports he developed eye styes / chalazia from Velcade and subsequently discontinued the medication. He may have had a brief time on Kyprolis with Rev and Dex (he believes it was started by Dr Chapin), however he developed severe back spasms on the Revlimid and this led to its discontinuation. He also had insomnia while taking steroids. He switched his care over to [facility] in Oct 2019.\par \par In January 2020 he had a repeat BM biopsy and was found to have a CR with undetectable M-spike and normalization of his FLC. During this time he was being seen by Dr Chapin at [facility], who advised against autologous stem cell transplantation in first CR. He then underwent stem cell collection (stored at a cryobank at [facility]) and he was subsequently started on therapy with Ninlaro and Venetoclax maintenance (still in the front-line setting). 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 His maintenance therapy was discontinued Nov 10, 2021 and has yet to be restarted. His LVEF while on RVd in Oct 2019 was estimated at 69% ([facility]). His most recent LVEF checked at [facility] in Feb 2022 was reduced down to 39%. He has been following cardiology for optimization of his heart failure medications, for which he is taking metoprolol and losartan.\par \par Since achieving a CR in 2020, imaging with PET-CT and Bone marrow biopsies up to this point have been negative for residual disease and lesions. His last BM biopsy was in June 2021 at [facility]. He did have clonoseq testing for the ID sample, but has yet to have any MRD measurements. He was previously on Ninlaro 3 mg once weekly for 3/4 weeks every 28 day cycle and venetoclax was taken at 400 mg PO 5/7 days a week of every 28 day cycle.\par \par He reports that he had back surgery in March 2022. He was having back pain which is when they discovered he has herniated disks L4 and L5 and underwent microdiscectomy on 3/15/22. He has had a change in insurance to [facility] [employer], which is no longer covered at [facility] and he came to [facility] to discuss further management.\par \par Today he overall feels well. He has not experienced any changes in his health since his back surgery. He is currently not on any myeloma-directed therapy. He denies any new bone pain, fevers, chills, night sweats, neuropathic pain, urinary / kidney issues.\par \par ==================================================================\par Treatment Hx:\par \par VRd - unspecified number of cycles, started June 2019\par KRd? - unclear\par Ninlaro + Venetoclax (last dose 11/10/2021)\par \par ________________________________________________________\par Care Providers - in process of transferring all his care to [facility]\par \par PMD: Dr Apolinar Ballard ([facility])\par Cardiology: Dr Gurwinder Roblero ([facility])\par Oncologist / Hematologist: Dr Mian Chapin ([facility]) Tel: [phone], Fax: [phone]  \par \par ________________________________________________________ [de-identified] : ? [de-identified] : Outside pathology at [facility]:\par BM biopsy and aspirate May 2019: \par BM core - numerous plasma cells estimated up to 70%.\par BM aspirate - 40% lambda restricted plasma cells\par \par FISH: t(11;14) CCND1-IGH gene in 45% of cells, deletion 13q14.3 region in 40% of cells and TP53 gene deletion in 28% of cells. [FreeTextEntry1] : Off treatment [de-identified] : 3/30/22: Initial visit.\par \par 5/25/22: Follow-up. He has been feeling well overall. He has joined the gym 3 weeks ago and has been more active. He recently went on a cruise without any limitations in his activities. He feels he has more energy lately. His next echocardiogram is in September. No new lumps or bumps.  Weight is stable and appetite is good. He denies any constitutional complaints. He has a planned virtual appointment with Dr. Mian Chapin on 6/21/22.\par \par ____\par A comprehensive review of systems was performed including constitutional, eyes, ENT, cardiovascular, respiratory, gastrointestinal, genitourinary, musculoskeletal, integumentary, neurological, psychiatric and hematologic / lymphatic. All pertinent positives are included in the H&P under interval history above and the remaining review of systems listed are negative.

## 2022-06-09 NOTE — ED PROVIDER NOTE - WR ORDER ID 1
Follow  up with Primary care provider if this persist tomorrow for further evaluation.   If symptoms get worse, develops paralysis, uncontrollable Nausea Vomiting, confusion, disorientation. Chest Pain, or Shortness Of Breath,  go to emergency room.  
[id number]

## 2022-06-13 LAB
ALBUMIN MFR SERPL ELPH: 65.3 %
ALBUMIN SERPL ELPH-MCNC: 5 G/DL
ALBUMIN SERPL-MCNC: 4.4 G/DL
ALBUMIN/GLOB SERPL: 1.9 RATIO
ALP BLD-CCNC: 138 U/L
ALPHA1 GLOB MFR SERPL ELPH: 4.2 %
ALPHA1 GLOB SERPL ELPH-MCNC: 0.3 G/DL
ALPHA2 GLOB MFR SERPL ELPH: 10.4 %
ALPHA2 GLOB SERPL ELPH-MCNC: 0.7 G/DL
ALT SERPL-CCNC: 29 U/L
ANION GAP SERPL CALC-SCNC: 13 MMOL/L
AST SERPL-CCNC: 34 U/L
B-GLOBULIN MFR SERPL ELPH: 10.8 %
B-GLOBULIN SERPL ELPH-MCNC: 0.7 G/DL
B2 MICROGLOB SERPL-MCNC: 1.6 MG/L
BILIRUB SERPL-MCNC: 2.6 MG/DL
BUN SERPL-MCNC: 16 MG/DL
CALCIUM SERPL-MCNC: 9.5 MG/DL
CHLORIDE SERPL-SCNC: 105 MMOL/L
CO2 SERPL-SCNC: 24 MMOL/L
CREAT SERPL-MCNC: 1.01 MG/DL
DEPRECATED KAPPA LC FREE/LAMBDA SER: 1.75 RATIO
EGFR: 86 ML/MIN/1.73M2
GAMMA GLOB FLD ELPH-MCNC: 0.6 G/DL
GAMMA GLOB MFR SERPL ELPH: 9.3 %
GLUCOSE SERPL-MCNC: 88 MG/DL
IGA SER QL IEP: 88 MG/DL
IGG SER QL IEP: 730 MG/DL
IGM SER QL IEP: 35 MG/DL
INTERPRETATION SERPL IEP-IMP: NORMAL
KAPPA LC CSF-MCNC: 0.36 MG/DL
KAPPA LC SERPL-MCNC: 0.63 MG/DL
M PROTEIN SPEC IFE-MCNC: NORMAL
POTASSIUM SERPL-SCNC: 4.7 MMOL/L
PROT SERPL-MCNC: 6.7 G/DL
SODIUM SERPL-SCNC: 142 MMOL/L

## 2022-06-21 ENCOUNTER — TRANSCRIPTION ENCOUNTER (OUTPATIENT)
Age: 57
End: 2022-06-21

## 2022-07-05 ENCOUNTER — TRANSCRIPTION ENCOUNTER (OUTPATIENT)
Age: 57
End: 2022-07-05

## 2022-07-13 ENCOUNTER — RESULT REVIEW (OUTPATIENT)
Age: 57
End: 2022-07-13

## 2022-07-13 ENCOUNTER — OUTPATIENT (OUTPATIENT)
Dept: OUTPATIENT SERVICES | Facility: HOSPITAL | Age: 57
LOS: 1 days | Discharge: HOME | End: 2022-07-13

## 2022-07-13 ENCOUNTER — APPOINTMENT (OUTPATIENT)
Dept: HEMATOLOGY ONCOLOGY | Facility: CLINIC | Age: 57
End: 2022-07-13

## 2022-07-13 DIAGNOSIS — C90.00 MULTIPLE MYELOMA NOT HAVING ACHIEVED REMISSION: ICD-10-CM

## 2022-07-13 LAB
BASOPHILS # BLD AUTO: 0.01 K/UL — SIGNIFICANT CHANGE UP (ref 0–0.2)
BASOPHILS NFR BLD AUTO: 0.2 % — SIGNIFICANT CHANGE UP (ref 0–2)
EOSINOPHIL # BLD AUTO: 0.01 K/UL — SIGNIFICANT CHANGE UP (ref 0–0.5)
EOSINOPHIL NFR BLD AUTO: 0.2 % — SIGNIFICANT CHANGE UP (ref 0–6)
HCT VFR BLD CALC: 40.8 % — SIGNIFICANT CHANGE UP (ref 39–50)
HGB BLD-MCNC: 13.7 G/DL — SIGNIFICANT CHANGE UP (ref 13–17)
IMM GRANULOCYTES NFR BLD AUTO: 0.6 % — SIGNIFICANT CHANGE UP (ref 0–1.5)
LYMPHOCYTES # BLD AUTO: 0.73 K/UL — LOW (ref 1–3.3)
LYMPHOCYTES # BLD AUTO: 14.7 % — SIGNIFICANT CHANGE UP (ref 13–44)
MCHC RBC-ENTMCNC: 33.6 G/DL — SIGNIFICANT CHANGE UP (ref 32–36)
MCHC RBC-ENTMCNC: 34 PG — SIGNIFICANT CHANGE UP (ref 27–34)
MCV RBC AUTO: 101.2 FL — HIGH (ref 80–100)
MONOCYTES # BLD AUTO: 0.44 K/UL — SIGNIFICANT CHANGE UP (ref 0–0.9)
MONOCYTES NFR BLD AUTO: 8.9 % — SIGNIFICANT CHANGE UP (ref 2–14)
NEUTROPHILS # BLD AUTO: 3.73 K/UL — SIGNIFICANT CHANGE UP (ref 1.8–7.4)
NEUTROPHILS NFR BLD AUTO: 75.4 % — SIGNIFICANT CHANGE UP (ref 43–77)
NRBC # BLD: 0 /100 WBCS — SIGNIFICANT CHANGE UP (ref 0–0)
PLATELET # BLD AUTO: 203 K/UL — SIGNIFICANT CHANGE UP (ref 150–400)
RBC # BLD: 4.03 M/UL — LOW (ref 4.2–5.8)
RBC # FLD: 11.5 % — SIGNIFICANT CHANGE UP (ref 10.3–14.5)
WBC # BLD: 4.95 K/UL — SIGNIFICANT CHANGE UP (ref 3.8–10.5)
WBC # FLD AUTO: 4.95 K/UL — SIGNIFICANT CHANGE UP (ref 3.8–10.5)

## 2022-07-14 LAB
ALBUMIN SERPL ELPH-MCNC: 4.6 G/DL
ALP BLD-CCNC: 100 U/L
ALT SERPL-CCNC: 23 U/L
ANION GAP SERPL CALC-SCNC: 10 MMOL/L
AST SERPL-CCNC: 20 U/L
BILIRUB SERPL-MCNC: 2 MG/DL
BUN SERPL-MCNC: 20 MG/DL
CALCIUM SERPL-MCNC: 9.4 MG/DL
CHLORIDE SERPL-SCNC: 103 MMOL/L
CO2 SERPL-SCNC: 28 MMOL/L
CREAT SERPL-MCNC: 1.09 MG/DL
EGFR: 79 ML/MIN/1.73M2
GLUCOSE SERPL-MCNC: 114 MG/DL
LDH SERPL-CCNC: 172 U/L
POTASSIUM SERPL-SCNC: 4.7 MMOL/L
PROT SERPL-MCNC: 6.2 G/DL
SODIUM SERPL-SCNC: 142 MMOL/L

## 2022-07-18 ENCOUNTER — TRANSCRIPTION ENCOUNTER (OUTPATIENT)
Age: 57
End: 2022-07-18

## 2022-07-19 ENCOUNTER — APPOINTMENT (OUTPATIENT)
Dept: ORTHOPEDIC SURGERY | Facility: CLINIC | Age: 57
End: 2022-07-19

## 2022-07-19 VITALS
HEIGHT: 70 IN | BODY MASS INDEX: 28.35 KG/M2 | WEIGHT: 198 LBS | SYSTOLIC BLOOD PRESSURE: 110 MMHG | DIASTOLIC BLOOD PRESSURE: 73 MMHG

## 2022-07-19 PROCEDURE — 99214 OFFICE O/P EST MOD 30 MIN: CPT

## 2022-07-19 NOTE — DISCUSSION/SUMMARY
[de-identified] : PABLO SINGH is a 57 year y/o male who returns for follow up visit of Right (R) shoulder pain. He reports compliance with PT ([facility] [place]) with improvement of symptoms but has recently discontinued. He reports pain has resolved however complains of persistent weakness of the right UE. He is status post steroid injection 06/07/22. \par \par At this point, patient ROM has greatly improved, and his pain has subsided, he is doing well and happy with his progress so far. I discussed operative and non-operative treatment modalities. Conservative measures of treatment include rest until asymptomatic, activity avoidance, NSAID's PRN, application to ice to the area 2-3x daily for 20 minutes, with gradual return to activities. He will continue HEP at this time. Patient will follow up in 2-3 months for repeat clinical assessment. All questions were answered and the patient verbalized understanding. The patient is in agreement with this treatment plan.

## 2022-07-19 NOTE — PHYSICAL EXAM
[de-identified] : Physical Exam:\par General: Well appearing, no acute distress, A&O\par Neurologic: A&Ox3, No focal deficits\par Head: NCAT without abrasions, lacerations, or ecchymosis to head, face, or scalp\par Eyes: No scleral icterus, no gross abnormalities\par Respiratory: Equal chest wall expansion bilaterally, no accessory muscle use\par Lymphatic: No lymphadenopathy palpated\par Skin: Warm and dry\par Psychiatric: Normal mood and affect\par \par C-Spine\par Palpation: Tenderness to paraspinal muscles and trapezius muscle\par ROM: side bending, symmetrical. Pain with extension and flexion of the neck.\par Reflexes: C5-7 [normal]\par \par Right Shoulder\par ·	Inspection/Palpation: no Bicipital groove tenderness, no crepitus, no deformities\par ·	Range of Motion: no crepitus with ROM; Active ; ER at side 85; IR to L1; \par ·	Strength: forward elevation in scapular plane 5/5, internal rotation 5/5, external rotation 5/5, adduction 5/5 and abduction 5/5 \par ·	Stability: no joint instability on provocative testing\par ·	Tests: Jones test negative, Neer negative, NEG drop arm test, bear hug test negative Napolean sign negative, cross arm adduction positive, lift off sign negative hornblowers sign negative, speeds test negative Yergason's test negative Mccann's Active Compression test positive, whipple test negative, bicep's load II test positive\par \par Left Shoulder\par ·	Inspection/Palpation: no tenderness, swelling or deformities\par ·	Range of Motion: full and painless in all planes, no crepitus\par ·	Strength: forward elevation in scapular plane 5/5, internal rotation 5/5, external rotation 5/5, adduction 5/5 and abduction 5/5\par ·	Stability: no joint instability on provocative testing\par Tests: Jones test negative, Neer sign negative, negative drop arm test secondary to pain, bear hug test negative, Napolean sign negative, cross arm adduction negative, lift off sign positive, hornblowers sign negative, speeds test negative, Yergason's test negative, no bicipital groove tenderness, Mccann's Active Compression test negative  [de-identified] : Procedure: MRI of Right (R) shoulder \par Dated: 9/27/2017\par \par Impression:\par \par Rotator cuff tendinosis with focal moderate to high-grade partial-thickness tear of the supraspinatus tendon.\par Low-grade partial thickness tear of the infraspinatus tendon.\par Biceps tendinosis and tenosynovitis without tear.\par Moderate to severe acromioclavicular joint osteoarthrosis with mild bursitis.\par Posterior and anterior labral tears.\par

## 2022-07-19 NOTE — HISTORY OF PRESENT ILLNESS
[___ yrs] : [unfilled] year(s) ago [7] : an average pain level of 7/10 [Lifting] : worsened by lifting [Improving] : improving [2] : a current pain level of 2/10 [de-identified] : PABLO SINGH is a 57 year y/o male who returns for follow up visit of Right (R) shoulder pain. He reports compliance with PT ([facility] [place]) with improvement of symptoms but has recently discontinued. He reports pain has resolved however complains of persistent weakness of the right UE. He is status post steroid injection 06/07/22.  [Exercise Regimen] : not relieved by exercise regimen

## 2022-07-28 ENCOUNTER — RESULT REVIEW (OUTPATIENT)
Age: 57
End: 2022-07-28

## 2022-07-28 ENCOUNTER — APPOINTMENT (OUTPATIENT)
Dept: HEMATOLOGY ONCOLOGY | Facility: CLINIC | Age: 57
End: 2022-07-28

## 2022-07-28 LAB
BASOPHILS # BLD AUTO: 0.01 K/UL — SIGNIFICANT CHANGE UP (ref 0–0.2)
BASOPHILS NFR BLD AUTO: 0.2 % — SIGNIFICANT CHANGE UP (ref 0–2)
EOSINOPHIL # BLD AUTO: 0 K/UL — SIGNIFICANT CHANGE UP (ref 0–0.5)
EOSINOPHIL NFR BLD AUTO: 0 % — SIGNIFICANT CHANGE UP (ref 0–6)
HCT VFR BLD CALC: 42.2 % — SIGNIFICANT CHANGE UP (ref 39–50)
HGB BLD-MCNC: 14.5 G/DL — SIGNIFICANT CHANGE UP (ref 13–17)
IMM GRANULOCYTES NFR BLD AUTO: 0.6 % — SIGNIFICANT CHANGE UP (ref 0–1.5)
LYMPHOCYTES # BLD AUTO: 0.42 K/UL — LOW (ref 1–3.3)
LYMPHOCYTES # BLD AUTO: 6.8 % — LOW (ref 13–44)
MCHC RBC-ENTMCNC: 34.4 G/DL — SIGNIFICANT CHANGE UP (ref 32–36)
MCHC RBC-ENTMCNC: 34.5 PG — HIGH (ref 27–34)
MCV RBC AUTO: 100.5 FL — HIGH (ref 80–100)
MONOCYTES # BLD AUTO: 0.79 K/UL — SIGNIFICANT CHANGE UP (ref 0–0.9)
MONOCYTES NFR BLD AUTO: 12.8 % — SIGNIFICANT CHANGE UP (ref 2–14)
NEUTROPHILS # BLD AUTO: 4.91 K/UL — SIGNIFICANT CHANGE UP (ref 1.8–7.4)
NEUTROPHILS NFR BLD AUTO: 79.6 % — HIGH (ref 43–77)
NRBC # BLD: 0 /100 WBCS — SIGNIFICANT CHANGE UP (ref 0–0)
PLATELET # BLD AUTO: 130 K/UL — LOW (ref 150–400)
RBC # BLD: 4.2 M/UL — SIGNIFICANT CHANGE UP (ref 4.2–5.8)
RBC # FLD: 11.9 % — SIGNIFICANT CHANGE UP (ref 10.3–14.5)
WBC # BLD: 6.17 K/UL — SIGNIFICANT CHANGE UP (ref 3.8–10.5)
WBC # FLD AUTO: 6.17 K/UL — SIGNIFICANT CHANGE UP (ref 3.8–10.5)

## 2022-08-01 ENCOUNTER — TRANSCRIPTION ENCOUNTER (OUTPATIENT)
Age: 57
End: 2022-08-01

## 2022-08-01 RX ORDER — LOSARTAN POTASSIUM 25 MG/1
25 TABLET, FILM COATED ORAL DAILY
Qty: 15 | Refills: 5 | Status: DISCONTINUED | COMMUNITY
Start: 2022-06-06 | End: 2022-08-01

## 2022-08-15 ENCOUNTER — APPOINTMENT (OUTPATIENT)
Dept: HEMATOLOGY ONCOLOGY | Facility: CLINIC | Age: 57
End: 2022-08-15

## 2022-08-15 ENCOUNTER — RESULT REVIEW (OUTPATIENT)
Age: 57
End: 2022-08-15

## 2022-08-15 VITALS
WEIGHT: 196.21 LBS | DIASTOLIC BLOOD PRESSURE: 77 MMHG | SYSTOLIC BLOOD PRESSURE: 123 MMHG | BODY MASS INDEX: 28.15 KG/M2 | RESPIRATION RATE: 16 BRPM | OXYGEN SATURATION: 98 % | HEART RATE: 66 BPM | TEMPERATURE: 98.4 F

## 2022-08-15 LAB
BASOPHILS # BLD AUTO: 0.01 K/UL — SIGNIFICANT CHANGE UP (ref 0–0.2)
BASOPHILS NFR BLD AUTO: 0.2 % — SIGNIFICANT CHANGE UP (ref 0–2)
EOSINOPHIL # BLD AUTO: 0.01 K/UL — SIGNIFICANT CHANGE UP (ref 0–0.5)
EOSINOPHIL NFR BLD AUTO: 0.2 % — SIGNIFICANT CHANGE UP (ref 0–6)
HCT VFR BLD CALC: 39.6 % — SIGNIFICANT CHANGE UP (ref 39–50)
HGB BLD-MCNC: 13.3 G/DL — SIGNIFICANT CHANGE UP (ref 13–17)
IMM GRANULOCYTES NFR BLD AUTO: 0.2 % — SIGNIFICANT CHANGE UP (ref 0–1.5)
LYMPHOCYTES # BLD AUTO: 0.82 K/UL — LOW (ref 1–3.3)
LYMPHOCYTES # BLD AUTO: 18.6 % — SIGNIFICANT CHANGE UP (ref 13–44)
MCHC RBC-ENTMCNC: 33.6 G/DL — SIGNIFICANT CHANGE UP (ref 32–36)
MCHC RBC-ENTMCNC: 34 PG — SIGNIFICANT CHANGE UP (ref 27–34)
MCV RBC AUTO: 101.3 FL — HIGH (ref 80–100)
MONOCYTES # BLD AUTO: 0.64 K/UL — SIGNIFICANT CHANGE UP (ref 0–0.9)
MONOCYTES NFR BLD AUTO: 14.5 % — HIGH (ref 2–14)
NEUTROPHILS # BLD AUTO: 2.92 K/UL — SIGNIFICANT CHANGE UP (ref 1.8–7.4)
NEUTROPHILS NFR BLD AUTO: 66.3 % — SIGNIFICANT CHANGE UP (ref 43–77)
NRBC # BLD: 0 /100 WBCS — SIGNIFICANT CHANGE UP (ref 0–0)
PLATELET # BLD AUTO: 167 K/UL — SIGNIFICANT CHANGE UP (ref 150–400)
RBC # BLD: 3.91 M/UL — LOW (ref 4.2–5.8)
RBC # FLD: 12.2 % — SIGNIFICANT CHANGE UP (ref 10.3–14.5)
WBC # BLD: 4.41 K/UL — SIGNIFICANT CHANGE UP (ref 3.8–10.5)
WBC # FLD AUTO: 4.41 K/UL — SIGNIFICANT CHANGE UP (ref 3.8–10.5)

## 2022-08-15 PROCEDURE — 99214 OFFICE O/P EST MOD 30 MIN: CPT

## 2022-08-15 NOTE — RESULTS/DATA
[FreeTextEntry1] : =========================================================================\par  EXAM: [id number] - PETCT WB ONC FDG SUBS  - ORDERED BY: ASIF HARTMANN\par \par PROCEDURE DATE:  04/30/2022  \par  \par INTERPRETATION:  PROCEDURE:  PET/CT WHOLE BODY ([id number])\par \par CLINICAL INFORMATION: 57-year-old male diagnosed with multiple myeloma in \par 2019, status post therapy. PET/CT is done as part of subsequent treatment \par strategy evaluation.\par \par RADIOPHARMACEUTICAL:  11.64 mCi F-18, FDG, I.V.\par \par TECHNIQUE:  Fasting blood sugar measured prior to injection of \par radiopharmaceutical was 78 mg/dl. Following intravenous injection of the \par radiopharmaceutical and an uptake period of approximately 60 minutes, \par FDG-PET/CT imaging was performed on a Siemens Biograph mCT 64 PET-CT from \par vertex of the skull to the toes. Oral contrast was given during the \par uptake period. CT was performed during shallow respiration. The CT \par protocol was optimized for PET attenuation correction and anatomic \par localization. The CT protocol was not designed to produce and cannot \par replace state-of-the-art diagnostic CT images with specific imaging \par protocols for different body parts and indications. Images were reviewed \par on a dedicated workstation using multiplanar reconstruction.\par \par The standardized uptake values (SUV) are normalized to patient body \par weight and indicate the highest activity concentration (SUVmax) in a \par given disease site. All image numbers refer to axial image number.\par \par COMPARISON:  No prior PET/CT is available for prior PET/CT..\par \par OTHER STUDIES USED FOR CORRELATION: MRI of lumbar spine from 2/7/2022 and \par CT angiogram chest from 2/6/2022.\par \par FINDINGS:\par \par HEAD/NECK: Physiologic FDG activity in the brain, major salivary glands, \par and neck muscles.\par CHEST: No abnormal FDG activity. No lymphadenopathy.\par LUNGS: No abnormal FDG activity. No nodule.\par PLEURA/PERICARDIUM:Physiologic FDG activity. No pleural or pericardial \par effusion.\par \par HEPATOBILIARY/PANCREAS: Physiologic FDG activity. Liver SUV mean is 2.7.\par SPLEEN: Physiologic FDG activity. Normal in size.\par ADRENAL GLANDS: No abnormal FDG activity. No pleural or pericardial \par effusion.\par KIDNEYS/URINARY BLADDER: Physiologic excreted FDG activity.\par PELVIC ORGANS: No abnormal FDG activity.\par ABDOMINOPELVIC NODES: No enlarged or FDG avid lymph nodes.\par BOWEL/PERITONEUM/MESENTERY: No abnormal FDG activity.\par BONES/SOFT TISSUES:Nonspecific heterogeneous FDG activity in the spinous \par processes of L3 to L5 with no corresponding abnormalities on CT, and \par associated overlying soft tissue stranding (SUV 9.7; image 251).\par \par IMPRESSION:  FDG-PET/CT scan demonstrates:\par \par Nonspecific heterogeneous FDG activity in the spinous processes of L3-L5 without CT correlate, with associated mildly FDG avid overlying soft tissue stranding. This may be secondary to post traumatic changes. Please correlate clinically.\par \par Direct comparison will be performed and an addendum issued when prior PET/CT is made available.\par \par --- End of Report ---\par \par BEN LOYA MD; Attending Nuclear Medicine \par This document has been electronically signed. May  1 2022  2:57PM\par \par =========================================================================

## 2022-08-15 NOTE — HISTORY OF PRESENT ILLNESS
[Disease:__________________________] : Disease: [unfilled] [Treatment Protocol] : Treatment Protocol [Date: ____________] : Patient's last distress assessment performed on [unfilled]. 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 Fib / A. Flutter (post-surgical, s/p cardioversion, not on AC), lumbar radiculopathy (s/p lumbar microdiscectomy 3/15/22) here for further management of his high-risk (TP53 gene deletion, del13q, t(11;14)) IgA Lambda multiple myeloma initially diagnosed in 2019. He presents here as a transfer of care related to change in insurance from [facility] system where he was most recently followed by Dr Mian Chapin.\par \par He was diagnosed with IgA lambda multiple myeloma with anemia (no bone lesions - on skeletal survey, renal issues or hypercalcemia) in May 2019. He was started on Revlimid, Velcade and Dexamethasone (RVd) in June 2019 under the care of Dr Sharif Arteaga (oncologist associated with [facility]). He reports he developed eye styes / chalazia from Velcade and subsequently discontinued the medication. He may have had a brief time on Kyprolis with Rev and Dex (he believes it was started by Dr Chapin), however he developed severe back spasms on the Revlimid and this led to its discontinuation. He also had insomnia while taking steroids. He switched his care over to [facility] in Oct 2019.\par \par In January 2020 he had a repeat BM biopsy and was found to have a CR with undetectable M-spike and normalization of his FLC. During this time he was being seen by Dr Chapin at [facility], who advised against autologous stem cell transplantation in first CR. He then underwent stem cell collection (stored at a cryobank at [facility]) and he was subsequently started on therapy with Ninlaro and Venetoclax maintenance (still in the front-line setting). 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 His maintenance therapy was discontinued Nov 10, 2021 and has yet to be restarted. His LVEF while on RVd in Oct 2019 was estimated at 69% ([facility]). His most recent LVEF checked at [facility] in Feb 2022 was reduced down to 39%. He has been following cardiology for optimization of his heart failure medications, for which he is taking metoprolol and losartan.\par \par Since achieving a CR in 2020, imaging with PET-CT and Bone marrow biopsies up to this point have been negative for residual disease and lesions. His last BM biopsy was in June 2021 at [facility]. He did have clonoseq testing for the ID sample, but has yet to have any MRD measurements. He was previously on Ninlaro 3 mg once weekly for 3/4 weeks every 28 day cycle and venetoclax was taken at 400 mg PO 5/7 days a week of every 28 day cycle.\par \par He reports that he had back surgery in March 2022. He was having back pain which is when they discovered he has herniated disks L4 and L5 and underwent microdiscectomy on 3/15/22. He has had a change in insurance to [employer], which is no longer covered at [facility] and he came to [facility] to discuss further management.\par \par Today he overall feels well. He has not experienced any changes in his health since his back surgery. He is currently not on any myeloma-directed therapy. He denies any new bone pain, fevers, chills, night sweats, neuropathic pain, urinary / kidney issues.\par \par ==================================================================\par Treatment Hx:\par \par VRd - unspecified number of cycles, started June 2019\par KRd? - unclear\par Ninlaro + Venetoclax (last dose 11/10/2021)\par \par ________________________________________________________\par Care Providers:\par \par PMD: Dr Apolinar Ballard ([facility])\par Cardiology: Dr Gurwinder Roblero ([facility])\par Oncologist / Hematologist: Dr Mian Chapin ([facility]) Tel: [phone], Fax: [phone]  \par \par ________________________________________________________ [de-identified] : ? [de-identified] : Outside pathology at [facility]:\par BM biopsy and aspirate May 2019: \par BM core - numerous plasma cells estimated up to 70%.\par BM aspirate - 40% lambda restricted plasma cells\par \par FISH: t(11;14) CCND1-IGH gene in 45% of cells, deletion 13q14.3 region in 40% of cells and TP53 gene deletion in 28% of cells. [de-identified] : 3/30/22: Initial visit.\par \par 5/25/22: Follow-up. He has been feeling well overall. He has joined the gym 3 weeks ago and has been more active. He recently went on a cruise without any limitations in his activities. He feels he has more energy lately. His next echocardiogram is in September. No new lumps or bumps.  Weight is stable and appetite is good. He denies any constitutional complaints. He has a planned virtual appointment with Dr. Mian Chapin on 6/21/22.\par \par 8/15/22: Follow-up. He is feeling well overall. He has been swimming often at either [employer] or town pools. No issues with exercise tolerance. No chest pain, palpitations or shortness of breath.  He is seeing the cardiologist at the end of September. He takes Ninlaro on Wednesdays once a week for 3 weeks on and 1 week off. No side effects to the medications besides belching. No bleeding complications or bruising. No blood in the stool, no bleeding from gums. No new bone pains. No weight loss. \par \par ____\par A comprehensive review of systems was performed including constitutional, eyes, ENT, cardiovascular, respiratory, gastrointestinal, genitourinary, musculoskeletal, integumentary, neurological, psychiatric and hematologic / lymphatic. All pertinent positives are included in the H&P under interval history above and the remaining review of systems listed are negative.  [FreeTextEntry1] : Ninlaro 3 weeks on and 1 week off + Venetoclax 400 mg M-F (Sat and Sun off) continuously

## 2022-08-15 NOTE — ASSESSMENT
[FreeTextEntry1] : 58 yo male with high risk IgA lambda Multiple Myeloma (Dx 2019) currently in a morphologic and serologic CR at last assessment here for further monitoring and management. He has been on 3 different treatment / maintenance regimens (including VRd, KRd, Venetoclax + Ninlaro) all in the front-line setting. 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 His last TTE in Feb 2022 showed heart failure with reduced ejection fraction at 39%. He had been off all maintenance therapy since 11/10/2021 but restarted again in June 2022.\par \par During our first visit on 3/30/22 we ordered immunoelectrophoresis studies which did not detect a monoclonal protein or elevated FLC in either the serum or urine. A repeat PET-CT was performed on 4/30/22 which did not show any areas of disease. In addition we performed a BM biopsy and aspirate on 4/8/22 in which no morphologic, cytogenetic, or immunophenotypic features of plasma cell myeloma were identified thus showing continued CR. Clonoseq MRD testing was sent on the first 1.5 - 2 mL aspirate pull which showed the presence of a residual sequence below the limit of detection (">0-1 residual clonal cells per million nucleated cell"). Only 1 of the original 3 sequences was detected (IGH Sequence A). Sequences for IGK and IGL patient-specific mutations were not detected.\par \par Following his last visit with Dr Chapin in June 2022, he was started back on maintenance Ninlaro and Venetoclax with the goal of total 5 years of therapy. In addition, he will continue to undergo annual PET-CT and clonoseq MRD testing with the next testing planned in April 2023. Dr Chapin will continue to be involved in Mr Marie care and plans to see him every 6 months via telemedicine.\par \par Plan:\par - Repeat myeloma panel today\par - Asymptomatic increase in T-bili on CMP related to Gilbert syndrome, no action required\par - Prefer serial BM monitoring (peripheral blood has reduced sensitivity) with clonoseq MRD testing if he is to remain off treatment, with the next 12 months, however this is open to discussion as there is no standard to the frequency of this testing at this time\par - Healthcare Maintenance: COVID-19 vaccinated, Pneumonia vaccine status (in progress, recently received Prevnar 20), ? shingles vaccination\par - Send note, BM reports and PET-CT reports to Dr Chapin's Office fax [phone]  \par - Follow up in 2 months.\par \par ___\par I personally have spent a total of 30 minutes of time on the date of this encounter reviewing test results, documenting findings, providing education, coordinating care and directly consulting with the patient.

## 2022-08-15 NOTE — CONSULT LETTER
[Dear  ___] : Dear ~ARELY, [Courtesy Letter:] : I had the pleasure of seeing your patient, [unfilled], in my office today. [Please see my note below.] : Please see my note below. [Referral Closing:] : Thank you very much for seeing this patient.  If you have any questions, please do not hesitate to contact me. [Sincerely,] : Sincerely, [FreeTextEntry3] : Pranav Hong DO, MSc\par , [facility]\par Attending Physician - Malignant Hematology\par [facility]\par [address] [place] [address]\par Tel: [phone]\par Fax: [phone]

## 2022-08-22 LAB
ALBUMIN MFR SERPL ELPH: 67.7 %
ALBUMIN SERPL ELPH-MCNC: 4.5 G/DL
ALBUMIN SERPL-MCNC: 3.9 G/DL
ALBUMIN/GLOB SERPL: 2.1 RATIO
ALP BLD-CCNC: 96 U/L
ALPHA1 GLOB MFR SERPL ELPH: 3.8 %
ALPHA1 GLOB SERPL ELPH-MCNC: 0.2 G/DL
ALPHA2 GLOB MFR SERPL ELPH: 9.3 %
ALPHA2 GLOB SERPL ELPH-MCNC: 0.5 G/DL
ALT SERPL-CCNC: 20 U/L
ANION GAP SERPL CALC-SCNC: 9 MMOL/L
AST SERPL-CCNC: 29 U/L
B-GLOBULIN MFR SERPL ELPH: 10.6 %
B-GLOBULIN SERPL ELPH-MCNC: 0.6 G/DL
B2 MICROGLOB SERPL-MCNC: 1.5 MG/L
BILIRUB SERPL-MCNC: 1.7 MG/DL
BUN SERPL-MCNC: 14 MG/DL
CALCIUM SERPL-MCNC: 9.3 MG/DL
CHLORIDE SERPL-SCNC: 104 MMOL/L
CO2 SERPL-SCNC: 29 MMOL/L
CREAT SERPL-MCNC: 1.05 MG/DL
DEPRECATED KAPPA LC FREE/LAMBDA SER: 1.93 RATIO
EGFR: 82 ML/MIN/1.73M2
GAMMA GLOB FLD ELPH-MCNC: 0.5 G/DL
GAMMA GLOB MFR SERPL ELPH: 8.6 %
GLUCOSE SERPL-MCNC: 86 MG/DL
IGA SER QL IEP: 46 MG/DL
IGG SER QL IEP: 594 MG/DL
IGM SER QL IEP: 23 MG/DL
INTERPRETATION SERPL IEP-IMP: NORMAL
KAPPA LC CSF-MCNC: 0.3 MG/DL
KAPPA LC SERPL-MCNC: 0.58 MG/DL
LDH SERPL-CCNC: 200 U/L
M PROTEIN SPEC IFE-MCNC: NORMAL
POTASSIUM SERPL-SCNC: 5 MMOL/L
PROT SERPL-MCNC: 5.8 G/DL
SODIUM SERPL-SCNC: 142 MMOL/L

## 2022-09-09 ENCOUNTER — OUTPATIENT (OUTPATIENT)
Dept: OUTPATIENT SERVICES | Facility: HOSPITAL | Age: 57
LOS: 1 days | Discharge: HOME | End: 2022-09-09

## 2022-09-09 DIAGNOSIS — C90.00 MULTIPLE MYELOMA NOT HAVING ACHIEVED REMISSION: ICD-10-CM

## 2022-09-12 ENCOUNTER — RESULT REVIEW (OUTPATIENT)
Age: 57
End: 2022-09-12

## 2022-09-12 ENCOUNTER — APPOINTMENT (OUTPATIENT)
Dept: HEMATOLOGY ONCOLOGY | Facility: CLINIC | Age: 57
End: 2022-09-12

## 2022-09-12 LAB
BASOPHILS # BLD AUTO: 0.01 K/UL — SIGNIFICANT CHANGE UP (ref 0–0.2)
BASOPHILS NFR BLD AUTO: 0.2 % — SIGNIFICANT CHANGE UP (ref 0–2)
EOSINOPHIL # BLD AUTO: 0.01 K/UL — SIGNIFICANT CHANGE UP (ref 0–0.5)
EOSINOPHIL NFR BLD AUTO: 0.2 % — SIGNIFICANT CHANGE UP (ref 0–6)
HCT VFR BLD CALC: 41.3 % — SIGNIFICANT CHANGE UP (ref 39–50)
HGB BLD-MCNC: 14.2 G/DL — SIGNIFICANT CHANGE UP (ref 13–17)
IMM GRANULOCYTES NFR BLD AUTO: 0.5 % — SIGNIFICANT CHANGE UP (ref 0–1.5)
LYMPHOCYTES # BLD AUTO: 0.54 K/UL — LOW (ref 1–3.3)
LYMPHOCYTES # BLD AUTO: 8.8 % — LOW (ref 13–44)
MCHC RBC-ENTMCNC: 34.4 G/DL — SIGNIFICANT CHANGE UP (ref 32–36)
MCHC RBC-ENTMCNC: 34.4 PG — HIGH (ref 27–34)
MCV RBC AUTO: 100 FL — SIGNIFICANT CHANGE UP (ref 80–100)
MONOCYTES # BLD AUTO: 0.73 K/UL — SIGNIFICANT CHANGE UP (ref 0–0.9)
MONOCYTES NFR BLD AUTO: 11.9 % — SIGNIFICANT CHANGE UP (ref 2–14)
NEUTROPHILS # BLD AUTO: 4.84 K/UL — SIGNIFICANT CHANGE UP (ref 1.8–7.4)
NEUTROPHILS NFR BLD AUTO: 78.4 % — HIGH (ref 43–77)
NRBC # BLD: 0 /100 WBCS — SIGNIFICANT CHANGE UP (ref 0–0)
PLATELET # BLD AUTO: 178 K/UL — SIGNIFICANT CHANGE UP (ref 150–400)
RBC # BLD: 4.13 M/UL — LOW (ref 4.2–5.8)
RBC # FLD: 11.8 % — SIGNIFICANT CHANGE UP (ref 10.3–14.5)
WBC # BLD: 6.16 K/UL — SIGNIFICANT CHANGE UP (ref 3.8–10.5)
WBC # FLD AUTO: 6.16 K/UL — SIGNIFICANT CHANGE UP (ref 3.8–10.5)

## 2022-09-13 ENCOUNTER — OUTPATIENT (OUTPATIENT)
Dept: OUTPATIENT SERVICES | Facility: HOSPITAL | Age: 57
LOS: 1 days | End: 2022-09-13
Payer: COMMERCIAL

## 2022-09-13 ENCOUNTER — RESULT REVIEW (OUTPATIENT)
Age: 57
End: 2022-09-13

## 2022-09-13 DIAGNOSIS — C90.00 MULTIPLE MYELOMA NOT HAVING ACHIEVED REMISSION: ICD-10-CM

## 2022-09-13 PROCEDURE — 93306 TTE W/DOPPLER COMPLETE: CPT

## 2022-09-16 ENCOUNTER — APPOINTMENT (OUTPATIENT)
Dept: CARDIOLOGY | Facility: CLINIC | Age: 57
End: 2022-09-16

## 2022-09-16 ENCOUNTER — NON-APPOINTMENT (OUTPATIENT)
Age: 57
End: 2022-09-16

## 2022-09-16 VITALS
SYSTOLIC BLOOD PRESSURE: 116 MMHG | HEIGHT: 70 IN | TEMPERATURE: 98.1 F | DIASTOLIC BLOOD PRESSURE: 74 MMHG | WEIGHT: 192 LBS | BODY MASS INDEX: 27.49 KG/M2 | OXYGEN SATURATION: 99 % | HEART RATE: 64 BPM

## 2022-09-16 PROCEDURE — 99214 OFFICE O/P EST MOD 30 MIN: CPT

## 2022-09-16 PROCEDURE — 93000 ELECTROCARDIOGRAM COMPLETE: CPT

## 2022-09-16 NOTE — HISTORY OF PRESENT ILLNESS
[FreeTextEntry1] : 57M hx of myeloma, MVP with ruptured chordae s/p MV/TV repair 11/30/2021, post-op Afib, HFimpEF EF 40%-->60% who presents for follow-up\par \par MM 5/2019 in remission since 1/2021- previously on immunotherapy with ?cardiac complications. Currently on ninlaro and venetoclax\par \par Feels well, swims regularly. Notes his resting HR is in the 60s\par No active cardiac sx\par tolerating his current GDMT\par \par 12/16/21:\par Chol 118/TG 87/HDL 37/LDL 81

## 2022-09-16 NOTE — CARDIOLOGY SUMMARY
[de-identified] : 9/16/22: NSR\par 3/22/2022: SR, nonspecific twave flattening [de-identified] : 3/4/20: SR, PVC 2.96% [de-identified] : 3/18/20: No ischemia. 12 minutes [de-identified] : 3/11/2021: \par EF 40%, s/p MVR,TVR\par \par 9/14/22:\par EF 60%

## 2022-09-16 NOTE — PHYSICAL EXAM
[facility] Hospitalist Group Progress Note Patient: Emory Torres Age: 79 y.o. : 1937 MR#: [id number] SSN: [id number] Date/Time: 2019 Subjective:   
 
Patient is lying in bed in NAD. No pain currently. Spoke to Yogi - she talked to patient's son this morning and they are accepting home hospice but she is waiting for dr. Zaki Adair to make a final decision at this point. Objective:  
 
/83 (BP 1 Location: Right arm, BP Patient Position: At rest)   Pulse 89   Temp 97 °F (36.1 °C)   Resp 22   Wt 58.6 kg (129 lb 3 oz)   SpO2 100%   BMI 18.54 kg/m² General:  Alert, cooperative currently, no acute distress   
Pulmonary:  CTA Bilaterally. No Wheezes, poor efforts Cardiovascular: Regular rate and Rhythm. GI:  Soft, Non distended, Non tender. + Bowel sounds. Extremities:  No pedal edema. Additional: diffuse muscle wasting and dry scaly skin Assessment/Plan: 1. Hypercalcemia likely due to highly suscipious multiple myeloma, Immunofixation shows M-Rell, IgA monoclonal protein with kappa light chain Specificity in 2018 and positive skeletal survey. 2. Fever could be from cancer. 3. ANN due to # 1, improving 4. Dehydration, poor oral intake 5. FTT 6. Urinary retention 7. Dementia 8. HTN 9. Severe protein calorie malnutrition Plan: 
 
Cont current management 
palliative care team input noted D/c heparin and ASA due to hematuria Cont feliciano for urinary retention Add comfort medications Discharge home with hospice once it is set up. Case discussed with:  [x]Patient  []Family  [x]Nursing  [x]Case Management DVT Prophylaxis:  []Lovenox  []Hep SQ  []SCDs  []Coumadin   []On Heparin gtt Labs:   
Recent Results (from the past 24 hour(s)) METABOLIC PANEL, BASIC Collection Time: 19  3:30 AM  
Result Value Ref Range Sodium 136 136 - 145 mmol/L  Potassium 3.0 (L) 3.5 - 5.5 mmol/L  
 Chloride 103 100 - 108 mmol/L  
 CO2 25 21 - 32 mmol/L Anion gap 8 3.0 - 18 mmol/L Glucose 83 74 - 99 mg/dL BUN 14 7.0 - 18 MG/DL Creatinine 0.99 0.6 - 1.3 MG/DL  
 BUN/Creatinine ratio 14 12 - 20 GFR est AA >60 >60 ml/min/1.73m2 GFR est non-AA >60 >60 ml/min/1.73m2 Calcium 9.5 8.5 - 10.1 MG/DL Signed By: Gurvinder Ochoa MD   
 January 30, 2019 [Soft] : abdomen soft [Non Tender] : non-tender [Normal] : alert and oriented, normal memory

## 2022-09-16 NOTE — DISCUSSION/SUMMARY
[FreeTextEntry1] : HFrEF EF 40%, NYHA Class I\par recovered EF now 60%\par hx of MV/TV repair\par MM, in remission, on maintenance medications\par \par -cont asa 81mg daily for presence of prosthetic material\par -IE ppx is indicated and was reviewed\par -cont metoprolol 100mg daily\par -intolerant to losartan. continue valsartan 20mg daily\par -given ongoing immunotherapy (different from initial regimen) he will require routine surveillance TTE's likely annually, though sooner should sx develop [EKG obtained to assist in diagnosis and management of assessed problem(s)] : EKG obtained to assist in diagnosis and management of assessed problem(s)

## 2022-09-28 ENCOUNTER — APPOINTMENT (OUTPATIENT)
Dept: OTOLARYNGOLOGY | Facility: CLINIC | Age: 57
End: 2022-09-28

## 2022-09-28 VITALS
HEART RATE: 78 BPM | WEIGHT: 192 LBS | SYSTOLIC BLOOD PRESSURE: 102 MMHG | DIASTOLIC BLOOD PRESSURE: 63 MMHG | HEIGHT: 70 IN | BODY MASS INDEX: 27.49 KG/M2

## 2022-09-28 PROCEDURE — 99203 OFFICE O/P NEW LOW 30 MIN: CPT

## 2022-09-28 PROCEDURE — 92557 COMPREHENSIVE HEARING TEST: CPT

## 2022-09-28 PROCEDURE — 92567 TYMPANOMETRY: CPT

## 2022-09-28 RX ORDER — CYCLOBENZAPRINE HYDROCHLORIDE 10 MG/1
10 TABLET, FILM COATED ORAL
Qty: 30 | Refills: 0 | Status: COMPLETED | COMMUNITY
Start: 2022-05-25 | End: 2022-09-28

## 2022-09-28 RX ORDER — GABAPENTIN 100 MG/1
100 CAPSULE ORAL
Qty: 120 | Refills: 3 | Status: COMPLETED | COMMUNITY
Start: 2022-03-04 | End: 2022-09-28

## 2022-09-28 NOTE — CONSULT LETTER
[Courtesy Letter:] : I had the pleasure of seeing your patient, [unfilled], in my office today. [Please see my note below.] : Please see my note below. [Consult Closing:] : Thank you very much for allowing me to participate in the care of this patient.  If you have any questions, please do not hesitate to contact me. [Sincerely,] : Sincerely, [Dear  ___] : Dear  [unfilled], [FreeTextEntry2] : Dr. Apolinar Ballard  [FreeTextEntry3] : Skylar Ríos MD\par Facial Plastic & Reconstructive Surgery\par Department of Otolaryngology\par [address] [facility]\par [place], [place]\par [phone]

## 2022-09-28 NOTE — HISTORY OF PRESENT ILLNESS
[de-identified] : 57 year old male presents with an initial evaluation of a ear check up.\par States history of cerumen impaction, scuba diving and Multiple myeloma (in remission)\par Reports concerns for hearing loss vs cerumen \par States he has noticed a decrease in hearing for the past year- worse on the right side.\par Reports occasional tinnitus--denies the use of ear drops\par Patient denies otalgia, otorrhea, ear infections, dizziness, vertigo, headaches related to hearing. \par No recent audio

## 2022-10-01 NOTE — DISCUSSION/SUMMARY
[de-identified] : PABLO SINGH is a 57 year y/o male who presents for initial visit of Right (R) shoulder pain.  The patient reports he has had numerous medical issues over the last 4 to 5 years.  In 2017 he was experiencing shoulder and neck pain.  He had an MRI of his neck and of his right shoulder.  The MRI of his right shoulder showed a high-grade partial rotator cuff tear.  There was some evidence of biceps tendinitis without tearing.  The patient states his cervical spine took precedence and he had 3 epidurals and his pain resolved.  He has dealt with COVID as well as cardiac surgery over the last few years.  He had a lumbar discectomy 2 months ago.  He presents today after returning to the gym 3 to 4 months ago.  He has been gradually feeling worsening pain in both shoulders as well as multiple body parts.  He states his primary care doctor has worked him up in the past for rheumatoid arthritis and Lyme's disease.  He currently is not working.  He localizes the pain to the anterior right shoulder.  The left shoulder started bothering him to a lesser extent and may be compensatory.  He denies paresthesias in his hands.\par \par We had a thorough discussion regarding the nature of his pain, the pathophysiology, as well as all treatment options. Based on his clinical exam, and radiographs, he has rotator cuff arthropathy for which I discussed operative and non-operative treatment modalities. Non operative tx options range from cortisone injection, PT, HEP, NSAIDs. Patient is currently taking losartan and meteropol after mitral valve repair. Pt due to his acute pain elected for a corticosteroid injection at today's visit and tolerated the procedure well. He should take it easy for the next 2-3 days while icing the joint. Patient was given prescription of formal physical therapy that he will start next wk for 2x/wk for 6-8 wks. Patient will follow up in 6-8 wks for repeat clinical assessment. At next visit, in case his knee pain is worsening we might obtain xray, but at this time, a home exercise sheet was provided and discussed with patient to follow. All questions were answered and the patient verbalized understanding. The patient is in agreement with this treatment plan.  No

## 2022-10-13 NOTE — ED ADULT NURSE REASSESSMENT NOTE - NS ED NURSE REASSESS COMMENT FT1
Patient ambulated to BR with steady gait, no SOB or CP Stelara Counseling:  I discussed with the patient the risks of ustekinumab including but not limited to immunosuppression, malignancy, posterior leukoencephalopathy syndrome, and serious infections.  The patient understands that monitoring is required including a PPD at baseline and must alert us or the primary physician if symptoms of infection or other concerning signs are noted.

## 2022-10-21 ENCOUNTER — APPOINTMENT (OUTPATIENT)
Dept: MRI IMAGING | Facility: CLINIC | Age: 57
End: 2022-10-21

## 2022-10-21 ENCOUNTER — APPOINTMENT (OUTPATIENT)
Dept: NEUROSURGERY | Facility: CLINIC | Age: 57
End: 2022-10-21

## 2022-10-21 VITALS
OXYGEN SATURATION: 96 % | HEART RATE: 65 BPM | HEIGHT: 70 IN | SYSTOLIC BLOOD PRESSURE: 125 MMHG | TEMPERATURE: 98.1 F | BODY MASS INDEX: 27.35 KG/M2 | WEIGHT: 191 LBS | DIASTOLIC BLOOD PRESSURE: 78 MMHG

## 2022-10-21 DIAGNOSIS — Z98.890 OTHER SPECIFIED POSTPROCEDURAL STATES: ICD-10-CM

## 2022-10-21 DIAGNOSIS — M51.16 INTERVERTEBRAL DISC DISORDERS WITH RADICULOPATHY, LUMBAR REGION: ICD-10-CM

## 2022-10-21 DIAGNOSIS — Z78.9 OTHER SPECIFIED HEALTH STATUS: ICD-10-CM

## 2022-10-21 DIAGNOSIS — Z87.39 PERSONAL HISTORY OF OTHER DISEASES OF THE MUSCULOSKELETAL SYSTEM AND CONNECTIVE TISSUE: ICD-10-CM

## 2022-10-21 DIAGNOSIS — M54.12 RADICULOPATHY, CERVICAL REGION: ICD-10-CM

## 2022-10-21 PROCEDURE — 72148 MRI LUMBAR SPINE W/O DYE: CPT

## 2022-10-21 PROCEDURE — 72141 MRI NECK SPINE W/O DYE: CPT

## 2022-10-21 PROCEDURE — 99214 OFFICE O/P EST MOD 30 MIN: CPT

## 2022-10-21 NOTE — CONSULT LETTER
[Dear  ___] : Dear  [unfilled], [Courtesy Letter:] : I had the pleasure of seeing your patient, [unfilled], in my office today. [Sincerely,] : Sincerely, [FreeTextEntry2] : [place] [address]   [FreeTextEntry1] : This is a 57 year old retired gentleman being seen in the office in follow up for an evaluation of incisional lower back pain without radiating pain of his lower extremities.   As you recall, this patient is known to the office for a disc prolapse at the L4 L5 level and he had undergone a discectomy.  Prior to his surgery he had lower back and right leg radicular pain.   The pain he had experienced preop has completely resolved and today the pain he is describing is different in nature and severity.   The patient does have  an important background history of a diagnosis of multiple myeloma which is currently in remission and is not undergoing any current immunotherapy or maintenance therapy.  He also underwent heart valve repair surgery in November 2021.  \par \par Approximately 9 weeks ago the patient noticed a sharp ice pick like pain in his lower back pain directly located at the site of his incision.   There is no associated radicular pain.   No tingling and numbness is present.  The patient states he has no leg weakness and no difficulties with walking and balance.  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  There was no heavy lifting involved but he was on his knees and back in awkward positions.  The pain was similar to a spasm and he did take Flexeril which had no effect.  The patient at the time began swimming which was beneficial and did somewhat relieve the pain.  The patient has begun physical therapy exercises on his own which provided no relief.  In addition, the patient has lower cervical spine pain that began about three months ago.  This pain is located at the junction of his shoulders bilaterally and is an achy pain. The pain occurs when he stands and is alleviated when he sits down.  The patient reports no radicular pain of each arm.  \par \par There are no recent images to review.\par \par On neurologic examination, the patient is alert and oriented.  Appears well and in no distress.  Speech clear and and fluent.  CN intact.   Hearing normal.  No pronator drift.  No dysmetria of UE and LE.  There is chronic subtle right dorsiflexion weakness, otherwise there is full strength in all muscle groups.  Reflexes are absent in the right ankle and 1/4 on the left ankle, otherwise 2+ in all other extremities and equal and symmetric through out.  No winslow sign.  Plantar reflexes are flexor.  SISI are intact.  No abnormal extraneous movements.  No clonus.  Romberg is absent.   Gait is steady. Tandem gait normal. Able to walk on heels and toes with out difficulty.  Negative straight leg testing bilaterally.   The patient also has decreased sensation to light touch on the lateral calf and top of the foot on the right-hand side.  No pain upon palpitation across his lower back and cervical region.    Full ROM of neck and LE.  \par \par The patient has been suffering with these symptoms over more than 3 months.  The patient has initiated his own self exercises along with a muscle relaxer that has not been  beneficial.  He will obtain an MRI of cervical and lumbar spine to rule out any herniated discs or possible postop fluid collection after undergoing a discectomy.  the patient will begin a dose of Medrol and instructions were provided with side effect profile discussed.  I encouraged the patient to continue exercises and possibly entertain aqua therapy.   The pain is localized and may be beneficial for the patient to use Salonpas patches and Bio Freeze balm.  I have explained that the patient should contact the office if any changes in his condition occur.  After his images are available he will call the office to see Dr. Beverly in follow up.     \par \par Thank you for kindly including me in the evaluation and treatment of your patient.  Please do not hesitate to contact me should you have any concerns or questions regarding this evaluation, the patient's diagnosis of a large disc extrusion at L4-5 or the recommendations and treatment plan for his current symptom profile. [FreeTextEntry3] : Kimberly Lombardo, DNP, NP Nurse Practitioner Neurosurgery and Spine [facility] [place] Assistant  [employer] email: [email or web address]  [address] P- [phone] F- [phone]

## 2022-10-21 NOTE — CONSULT LETTER
[Dear  ___] : Dear  [unfilled], [Courtesy Letter:] : I had the pleasure of seeing your patient, [unfilled], in my office today. [Sincerely,] : Sincerely, [FreeTextEntry2] : John Francis MD\par [address]\par [place] [address] \par  [FreeTextEntry1] : This very pleasant 57-year-old gentleman is seen in the office for urgent reevaluation of intense lower back pain with right greater than left sciatica symptoms.  The patient has an important background history of a diagnosis of multiple myeloma which is currently in remission and is not undergoing any current immunotherapy or maintenance therapy.  He also underwent heart valve repair surgery in November 2021.  He has a longstanding history of back pain going back to early 2019.  Approximately 6 weeks ago the patient noticed spontaneous sharp increase in lower back pain with shooting and burning pain down both legs posteriorly right greater than left.  The patient had recent multi focal pneumonia for which she was hospitalized in mid February 2022.  He has recovered nicely after a course of antibiotics.  During that admission the patient underwent MRI imaging of the lumbar spine which identified a very large extrusion of disc at L4-5 consistent with the patient's symptoms.  Unfortunately the patient's back symptoms have progressed and now he has more concerning symptoms of urinary urgency and has had several episodes where he has had incontinence because he could not get to restroom quick enough.  The patient states he has weakness and some difficulties with walking and balance.\par \par I have reviewed with the patient as well as his wife who is present with him the MRI images from [facility] at his previous admission.  I have pointed out in detail the large extruded disc at L4-5 with severe compromise of the entire spinal canal but it is more prominent in the lateral recess and foramen on the right-hand side.  There are adjacent segment degenerative changes and stable changes within the bone marrow of other vertebra compared to images in June 2019.  There is no evidence of progressive or concerning multiple myeloma within the spine currently.\par \par On examination the patient is clearly in some distress when ambulating.  The patient has increased pain with lower back range of motion and is currently wearing a lidocaine patch but he states it does not help very much.  The patient has positive straight leg raise on the right-hand side at 30 degrees of elevation with crossover.  There clear weakness of dorsi flexion on the right-hand side and it is normal on the left.  The patellar reflexes are symmetric and normal but the right ankle reflex is absent and the left ankle reflex is significantly diminished.  The patient also has decreased sensation to light touch on the lateral calf and top of the foot on the right-hand side.\par \par The patient has been suffering with these symptoms over more than 3 months.  Because of the combination of urinary urgency with incontinence episodes as well as objective findings of a dropfoot with significant dorsiflexion weakness on the right-hand side with sensory changes and reflex absence the patient is appropriate for surgical intervention based on the anatomic finding of a large extruded disc herniation.  Using surgical models as well as the patient's own imaging I have discussed in detail the surgical technique of a L4-5 microdiscectomy procedure.  We have discussed the potential risks and complications of the surgery.  In particular the patient's recent valve repair surgery, multifocal pneumonia, and background of multiple myeloma will require independent medical clearance for us to move forward though there is some significant urgency based on the patient's symptom profile to perform the decompression surgery.  We have also discussed the potential for persistent or new neurologic deficits, the potential for cerebrospinal fluid leak, the need for future surgery including possible fusion, infection, and postoperative pain and recovery expectations.  The patient and his wife indicated good understanding of our descriptions and explanations and that all questions have been fully addressed..  We will move forward with surgery at the earliest available time once medical clearance and cardiac clearance has been obtained.\par \par Thank you for kindly including me in the evaluation and treatment of your patient.  Please do not hesitate to contact me should you have any concerns or questions regarding this evaluation, the patient's diagnosis of a large disc extrusion at L4-5 or the recommendation for surgical intervention with a microdiscectomy procedure. [FreeTextEntry3] : Jeffry Beverly MD, PhD, FRCPSC \par Attending Neurosurgeon \par  of Neurosurgery \par [facility] \par [address], [address] floor \[place] [address] \par Office: [phone] \par Fax: [phone]\par \par

## 2022-10-21 NOTE — REASON FOR VISIT
[Follow-Up: _____] : a [unfilled] follow-up visit [Other: _____] : [unfilled] [FreeTextEntry1] : Lower back and neck pain

## 2022-10-24 ENCOUNTER — APPOINTMENT (OUTPATIENT)
Dept: HEMATOLOGY ONCOLOGY | Facility: CLINIC | Age: 57
End: 2022-10-24

## 2022-10-24 ENCOUNTER — RESULT REVIEW (OUTPATIENT)
Age: 57
End: 2022-10-24

## 2022-10-24 VITALS
WEIGHT: 192.88 LBS | TEMPERATURE: 98.3 F | BODY MASS INDEX: 27.68 KG/M2 | HEART RATE: 71 BPM | DIASTOLIC BLOOD PRESSURE: 83 MMHG | RESPIRATION RATE: 16 BRPM | OXYGEN SATURATION: 98 % | SYSTOLIC BLOOD PRESSURE: 116 MMHG

## 2022-10-24 LAB
BASOPHILS # BLD AUTO: 0 K/UL — SIGNIFICANT CHANGE UP (ref 0–0.2)
BASOPHILS NFR BLD AUTO: 0 % — SIGNIFICANT CHANGE UP (ref 0–2)
EOSINOPHIL # BLD AUTO: 0 K/UL — SIGNIFICANT CHANGE UP (ref 0–0.5)
EOSINOPHIL NFR BLD AUTO: 0 % — SIGNIFICANT CHANGE UP (ref 0–6)
HCT VFR BLD CALC: 40.5 % — SIGNIFICANT CHANGE UP (ref 39–50)
HGB BLD-MCNC: 13.5 G/DL — SIGNIFICANT CHANGE UP (ref 13–17)
IMM GRANULOCYTES NFR BLD AUTO: 0.3 % — SIGNIFICANT CHANGE UP (ref 0–0.9)
LYMPHOCYTES # BLD AUTO: 0.91 K/UL — LOW (ref 1–3.3)
LYMPHOCYTES # BLD AUTO: 14.6 % — SIGNIFICANT CHANGE UP (ref 13–44)
MCHC RBC-ENTMCNC: 33.3 G/DL — SIGNIFICANT CHANGE UP (ref 32–36)
MCHC RBC-ENTMCNC: 33.8 PG — SIGNIFICANT CHANGE UP (ref 27–34)
MCV RBC AUTO: 101.3 FL — HIGH (ref 80–100)
MONOCYTES # BLD AUTO: 1.01 K/UL — HIGH (ref 0–0.9)
MONOCYTES NFR BLD AUTO: 16.2 % — HIGH (ref 2–14)
NEUTROPHILS # BLD AUTO: 4.31 K/UL — SIGNIFICANT CHANGE UP (ref 1.8–7.4)
NEUTROPHILS NFR BLD AUTO: 68.9 % — SIGNIFICANT CHANGE UP (ref 43–77)
NRBC # BLD: 0 /100 WBCS — SIGNIFICANT CHANGE UP (ref 0–0)
PLATELET # BLD AUTO: 153 K/UL — SIGNIFICANT CHANGE UP (ref 150–400)
RBC # BLD: 4 M/UL — LOW (ref 4.2–5.8)
RBC # FLD: 11.4 % — SIGNIFICANT CHANGE UP (ref 10.3–14.5)
WBC # BLD: 6.25 K/UL — SIGNIFICANT CHANGE UP (ref 3.8–10.5)
WBC # FLD AUTO: 6.25 K/UL — SIGNIFICANT CHANGE UP (ref 3.8–10.5)

## 2022-10-24 PROCEDURE — 99214 OFFICE O/P EST MOD 30 MIN: CPT

## 2022-10-27 LAB
ALBUMIN SERPL ELPH-MCNC: 4.2 G/DL
ALP BLD-CCNC: 113 U/L
ALT SERPL-CCNC: 26 U/L
ANION GAP SERPL CALC-SCNC: 18 MMOL/L
AST SERPL-CCNC: 37 U/L
B2 MICROGLOB SERPL-MCNC: 1.6 MG/L
BILIRUB SERPL-MCNC: 1 MG/DL
BUN SERPL-MCNC: 17 MG/DL
CALCIUM SERPL-MCNC: 9 MG/DL
CHLORIDE SERPL-SCNC: 105 MMOL/L
CO2 SERPL-SCNC: 20 MMOL/L
CREAT SERPL-MCNC: 0.87 MG/DL
EGFR: 100 ML/MIN/1.73M2
GLUCOSE SERPL-MCNC: 87 MG/DL
POTASSIUM SERPL-SCNC: 4.3 MMOL/L
PROT SERPL-MCNC: 5.9 G/DL
SODIUM SERPL-SCNC: 143 MMOL/L

## 2022-10-30 NOTE — RESULTS/DATA
[FreeTextEntry1] : =========================================================================\par  EXAM: [id number] - PETCT WB ONC FDG SUBS  - ORDERED BY: ASIF HARTMANN\par \par PROCEDURE DATE:  04/30/2022  \par  \par INTERPRETATION:  PROCEDURE:  PET/CT WHOLE BODY (00484)\par \par CLINICAL INFORMATION: 57-year-old male diagnosed with multiple myeloma in \par 2019, status post therapy. PET/CT is done as part of subsequent treatment \par strategy evaluation.\par \par RADIOPHARMACEUTICAL:  11.64 mCi F-18, FDG, I.V.\par \par TECHNIQUE:  Fasting blood sugar measured prior to injection of \par radiopharmaceutical was 78 mg/dl. Following intravenous injection of the \par radiopharmaceutical and an uptake period of approximately 60 minutes, \par FDG-PET/CT imaging was performed on a Siemens Biograph mCT 64 PET-CT from \par vertex of the skull to the toes. Oral contrast was given during the \par uptake period. CT was performed during shallow respiration. The CT \par protocol was optimized for PET attenuation correction and anatomic \par localization. The CT protocol was not designed to produce and cannot \par replace state-of-the-art diagnostic CT images with specific imaging \par protocols for different body parts and indications. Images were reviewed \par on a dedicated workstation using multiplanar reconstruction.\par \par The standardized uptake values (SUV) are normalized to patient body \par weight and indicate the highest activity concentration (SUVmax) in a \par given disease site. All image numbers refer to axial image number.\par \par COMPARISON:  No prior PET/CT is available for prior PET/CT..\par \par OTHER STUDIES USED FOR CORRELATION: MRI of lumbar spine from 2/7/2022 and \par CT angiogram chest from 2/6/2022.\par \par FINDINGS:\par \par HEAD/NECK: Physiologic FDG activity in the brain, major salivary glands, \par and neck muscles.\par CHEST: No abnormal FDG activity. No lymphadenopathy.\par LUNGS: No abnormal FDG activity. No nodule.\par PLEURA/PERICARDIUM:Physiologic FDG activity. No pleural or pericardial \par effusion.\par \par HEPATOBILIARY/PANCREAS: Physiologic FDG activity. Liver SUV mean is 2.7.\par SPLEEN: Physiologic FDG activity. Normal in size.\par ADRENAL GLANDS: No abnormal FDG activity. No pleural or pericardial \par effusion.\par KIDNEYS/URINARY BLADDER: Physiologic excreted FDG activity.\par PELVIC ORGANS: No abnormal FDG activity.\par ABDOMINOPELVIC NODES: No enlarged or FDG avid lymph nodes.\par BOWEL/PERITONEUM/MESENTERY: No abnormal FDG activity.\par BONES/SOFT TISSUES:Nonspecific heterogeneous FDG activity in the spinous \par processes of L3 to L5 with no corresponding abnormalities on CT, and \par associated overlying soft tissue stranding (SUV 9.7; image 251).\par \par IMPRESSION:  FDG-PET/CT scan demonstrates:\par \par Nonspecific heterogeneous FDG activity in the spinous processes of L3-L5 without CT correlate, with associated mildly FDG avid overlying soft tissue stranding. This may be secondary to post traumatic changes. Please correlate clinically.\par \par Direct comparison will be performed and an addendum issued when prior PET/CT is made available.\par \par --- End of Report ---\par \par BEN LOYA MD; Attending Nuclear Medicine \par This document has been electronically signed. May  1 2022  2:57PM\par \par =========================================================================

## 2022-10-30 NOTE — HISTORY OF PRESENT ILLNESS
[Disease:__________________________] : Disease: [unfilled] [Treatment Protocol] : Treatment Protocol [Date: ____________] : Patient's last distress assessment performed on [unfilled]. 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 Fib / A. Flutter (post-surgical, s/p cardioversion, not on AC), lumbar radiculopathy (s/p lumbar microdiscectomy 3/15/22) here for further management of his high-risk (TP53 gene deletion, del13q, t(11;14)) IgA Lambda multiple myeloma initially diagnosed in 2019. He presents here as a transfer of care related to change in insurance from [facility] system where he was most recently followed by Dr Mian Chapin.\par \par He was diagnosed with IgA lambda multiple myeloma with anemia (no bone lesions - on skeletal survey, renal issues or hypercalcemia) in May 2019. He was started on Revlimid, Velcade and Dexamethasone (RVd) in June 2019 under the care of Dr Sharif Arteaga (oncologist associated with [facility]). He reports he developed eye styes / chalazia from Velcade and subsequently discontinued the medication. He may have had a brief time on Kyprolis with Rev and Dex (he believes it was started by Dr Chapin), however he developed severe back spasms on the Revlimid and this led to its discontinuation. He also had insomnia while taking steroids. He switched his care over to [facility] in Oct 2019.\par \par In January 2020 he had a repeat BM biopsy and was found to have a CR with undetectable M-spike and normalization of his FLC. During this time he was being seen by Dr Chapin at [facility], who advised against autologous stem cell transplantation in first CR. He then underwent stem cell collection (stored at a cryobank at [facility]) and he was subsequently started on therapy with Ninlaro and Venetoclax maintenance (still in the front-line setting). 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 His maintenance therapy was discontinued Nov 10, 2021 and has yet to be restarted. His LVEF while on RVd in Oct 2019 was estimated at 69% ([facility]). His most recent LVEF checked at [facility] in Feb 2022 was reduced down to 39%. He has been following cardiology for optimization of his heart failure medications, for which he is taking metoprolol and losartan.\par \par Since achieving a CR in 2020, imaging with PET-CT and Bone marrow biopsies up to this point have been negative for residual disease and lesions. His last BM biopsy was in June 2021 at [facility]. He did have clonoseq testing for the ID sample, but has yet to have any MRD measurements. He was previously on Ninlaro 3 mg once weekly for 3/4 weeks every 28 day cycle and venetoclax was taken at 400 mg PO 5/7 days a week of every 28 day cycle.\par \par He reports that he had back surgery in March 2022. He was having back pain which is when they discovered he has herniated disks L4 and L5 and underwent microdiscectomy on 3/15/22. He has had a change in insurance to [employer], which is no longer covered at [facility] and he came to [facility] to discuss further management.\par \par Today he overall feels well. He has not experienced any changes in his health since his back surgery. He is currently not on any myeloma-directed therapy. He denies any new bone pain, fevers, chills, night sweats, neuropathic pain, urinary / kidney issues.\par \par ==================================================================\par Treatment Hx:\par \par VRd - unspecified number of cycles, started June 2019\par KRd? - unclear\par Ninlaro + Venetoclax (last dose 11/10/2021)\par \par ==================================================================\par Care Providers:\par \par PMD: Dr Apolinar Ballard ([facility])\par Cardiology: Dr Gurwinder Roblero ([facility])\par Oncologist / Hematologist: Dr Mian Chapin ([facility]) Tel: [phone], Fax: [phone]  \par \par ================================================================== [de-identified] : ? [de-identified] : Outside pathology at [facility]:\par BM biopsy and aspirate May 2019: \par BM core - numerous plasma cells estimated up to 70%.\par BM aspirate - 40% lambda restricted plasma cells\par \par FISH: t(11;14) CCND1-IGH gene in 45% of cells, deletion 13q14.3 region in 40% of cells and TP53 gene deletion in 28% of cells. [FreeTextEntry1] : Ninlaro 3 weeks on and 1 week off + Venetoclax 400 mg M-F (Sat and Sun off) continuously [de-identified] : 3/30/22: Initial visit.\par \par 5/25/22: Follow-up. He has been feeling well overall. He has joined the gym 3 weeks ago and has been more active. He recently went on a cruise without any limitations in his activities. He feels he has more energy lately. His next echocardiogram is in September. No new lumps or bumps.  Weight is stable and appetite is good. He denies any constitutional complaints. He has a planned virtual appointment with Dr. Mian Chapin on 6/21/22.\par \par 8/15/22: Follow-up. He is feeling well overall. He has been swimming often at either [place] or town pools. No issues with exercise tolerance. No chest pain, palpitations or shortness of breath.  He is seeing the cardiologist at the end of September. He takes Ninlaro on Wednesdays once a week for 3 weeks on and 1 week off. No side effects to the medications besides belching. No bleeding complications or bruising. No blood in the stool, no bleeding from gums. No new bone pains. No weight loss. \par \par 10/24/22: Follow-up. No complaints. Has gained some weight. No side effects from medications. No recent illnesses.\par \par ____\par A comprehensive review of systems was performed including constitutional, eyes, ENT, cardiovascular, respiratory, gastrointestinal, genitourinary, musculoskeletal, integumentary, neurological, psychiatric and hematologic / lymphatic. All pertinent positives are included in the H&P under interval history above and the remaining review of systems listed are negative. \par \par ==========================================================\par Treatment Hx: \par \par Maintenance with Ninlaro (weekly day 1,8,15) + Venetoclax (M-F) on 28-day cycles\par - Started back March 2022\par \par ==========================================================

## 2022-10-30 NOTE — ASSESSMENT
[FreeTextEntry1] : 56 yo male with high risk IgA lambda Multiple Myeloma (Dx 2019) currently in a morphologic and serologic CR at last assessment here for further monitoring and management. He has been on 3 different treatment / maintenance regimens (including VRd, KRd, Venetoclax + Ninlaro) all in the front-line setting. 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 His last TTE in Feb 2022 showed heart failure with reduced ejection fraction at 39%. He had been off all maintenance therapy since 11/10/2021 but restarted again in June 2022.\par \par During our first visit on 3/30/22 we ordered immunoelectrophoresis studies which did not detect a monoclonal protein or elevated FLC in either the serum or urine. A repeat PET-CT was performed on 4/30/22 which did not show any areas of disease. In addition we performed a BM biopsy and aspirate on 4/8/22 in which no morphologic, cytogenetic, or immunophenotypic features of plasma cell myeloma were identified thus showing continued CR. Clonoseq MRD testing was sent on the first 1.5 - 2 mL aspirate pull which showed the presence of a residual sequence below the limit of detection (">0-1 residual clonal cells per million nucleated cell"). Only 1 of the original 3 sequences was detected (IGH Sequence A). Sequences for IGK and IGL patient-specific mutations were not detected.\par \par Following his last visit with Dr Chapin in June 2022, he was started back on maintenance Ninlaro and Venetoclax with the goal of total 5 years of therapy. In addition, he will continue to undergo annual PET-CT and clonoseq MRD testing with the next testing planned in April 2023. Dr Chapin will continue to be involved in Mr Marie care and plans to see him every 6 months via telemedicine.\par \par Plan:\par - Repeat myeloma panel today\par - Asymptomatic increase in T-bili on CMP related to Gilbert syndrome, no action required\par - Prefer serial BM monitoring (peripheral blood has reduced sensitivity) with clonoseq MRD testing every 12 months, next due in April 2023\par - Healthcare Maintenance: COVID-19 vaccinated, continue with booster doses per current CDC guidelines, Pneumonia vaccinated with Prevnar 20, unclear if he rec'd pneumococcal 23 yest. Completed shingles vaccination. Annual derm visit recommended.\par - Send note, BM reports and PET-CT reports to Dr Chapin's Office fax [phone]  \par - Follow up in 2 months.\par \par ___\par I personally have spent a total of 30 minutes of time on the date of this encounter reviewing test results, documenting findings, providing education, coordinating care and directly consulting with the patient.

## 2022-10-30 NOTE — CONSULT LETTER
[Dear  ___] : Dear ~ARELY, [Courtesy Letter:] : I had the pleasure of seeing your patient, [unfilled], in my office today. [Please see my note below.] : Please see my note below. [Referral Closing:] : Thank you very much for seeing this patient.  If you have any questions, please do not hesitate to contact me. [Sincerely,] : Sincerely, [FreeTextEntry3] : Pranav Hong DO, MSc\par , [facility]\par Attending Physician - Malignant Hematology\par [facility]\par [address] [address]\par Tel: [phone]\par Fax: [phone]

## 2022-11-01 LAB
ALBUMIN MFR SERPL ELPH: 65.9 %
ALBUMIN SERPL-MCNC: 3.9 G/DL
ALBUMIN/GLOB SERPL: 2 RATIO
ALPHA1 GLOB MFR SERPL ELPH: 5.1 %
ALPHA1 GLOB SERPL ELPH-MCNC: 0.3 G/DL
ALPHA2 GLOB MFR SERPL ELPH: 10.7 %
ALPHA2 GLOB SERPL ELPH-MCNC: 0.6 G/DL
B-GLOBULIN MFR SERPL ELPH: 9.9 %
B-GLOBULIN SERPL ELPH-MCNC: 0.6 G/DL
DEPRECATED KAPPA LC FREE/LAMBDA SER: 1.59 RATIO
GAMMA GLOB FLD ELPH-MCNC: 0.5 G/DL
GAMMA GLOB MFR SERPL ELPH: 8.4 %
IGA SER QL IEP: 46 MG/DL
IGG SER QL IEP: 572 MG/DL
IGM SER QL IEP: 18 MG/DL
INTERPRETATION SERPL IEP-IMP: NORMAL
KAPPA LC CSF-MCNC: 0.34 MG/DL
KAPPA LC SERPL-MCNC: 0.54 MG/DL
M PROTEIN SPEC IFE-MCNC: NORMAL
PROT SERPL-MCNC: 5.9 G/DL
PROT SERPL-MCNC: 5.9 G/DL

## 2022-11-09 ENCOUNTER — OUTPATIENT (OUTPATIENT)
Dept: OUTPATIENT SERVICES | Facility: HOSPITAL | Age: 57
LOS: 1 days | Discharge: HOME | End: 2022-11-09

## 2022-11-09 DIAGNOSIS — C90.00 MULTIPLE MYELOMA NOT HAVING ACHIEVED REMISSION: ICD-10-CM

## 2022-11-15 ENCOUNTER — RESULT REVIEW (OUTPATIENT)
Age: 57
End: 2022-11-15

## 2022-11-15 ENCOUNTER — APPOINTMENT (OUTPATIENT)
Dept: HEMATOLOGY ONCOLOGY | Facility: CLINIC | Age: 57
End: 2022-11-15

## 2022-11-15 LAB
BASOPHILS # BLD AUTO: 0.01 K/UL — SIGNIFICANT CHANGE UP (ref 0–0.2)
BASOPHILS NFR BLD AUTO: 0.2 % — SIGNIFICANT CHANGE UP (ref 0–2)
EOSINOPHIL # BLD AUTO: 0.01 K/UL — SIGNIFICANT CHANGE UP (ref 0–0.5)
EOSINOPHIL NFR BLD AUTO: 0.2 % — SIGNIFICANT CHANGE UP (ref 0–6)
HCT VFR BLD CALC: 39.1 % — SIGNIFICANT CHANGE UP (ref 39–50)
HGB BLD-MCNC: 13.1 G/DL — SIGNIFICANT CHANGE UP (ref 13–17)
IMM GRANULOCYTES NFR BLD AUTO: 0.6 % — SIGNIFICANT CHANGE UP (ref 0–0.9)
LYMPHOCYTES # BLD AUTO: 0.83 K/UL — LOW (ref 1–3.3)
LYMPHOCYTES # BLD AUTO: 15.5 % — SIGNIFICANT CHANGE UP (ref 13–44)
MCHC RBC-ENTMCNC: 33.5 G/DL — SIGNIFICANT CHANGE UP (ref 32–36)
MCHC RBC-ENTMCNC: 33.9 PG — SIGNIFICANT CHANGE UP (ref 27–34)
MCV RBC AUTO: 101.3 FL — HIGH (ref 80–100)
MONOCYTES # BLD AUTO: 0.72 K/UL — SIGNIFICANT CHANGE UP (ref 0–0.9)
MONOCYTES NFR BLD AUTO: 13.4 % — SIGNIFICANT CHANGE UP (ref 2–14)
NEUTROPHILS # BLD AUTO: 3.76 K/UL — SIGNIFICANT CHANGE UP (ref 1.8–7.4)
NEUTROPHILS NFR BLD AUTO: 70.1 % — SIGNIFICANT CHANGE UP (ref 43–77)
NRBC # BLD: 0 /100 WBCS — SIGNIFICANT CHANGE UP (ref 0–0)
PLATELET # BLD AUTO: 145 K/UL — LOW (ref 150–400)
RBC # BLD: 3.86 M/UL — LOW (ref 4.2–5.8)
RBC # FLD: 12.1 % — SIGNIFICANT CHANGE UP (ref 10.3–14.5)
WBC # BLD: 5.36 K/UL — SIGNIFICANT CHANGE UP (ref 3.8–10.5)
WBC # FLD AUTO: 5.36 K/UL — SIGNIFICANT CHANGE UP (ref 3.8–10.5)

## 2022-11-16 ENCOUNTER — APPOINTMENT (OUTPATIENT)
Dept: INTERNAL MEDICINE | Facility: CLINIC | Age: 57
End: 2022-11-16

## 2022-11-16 VITALS
BODY MASS INDEX: 27.84 KG/M2 | TEMPERATURE: 98 F | OXYGEN SATURATION: 97 % | WEIGHT: 194 LBS | DIASTOLIC BLOOD PRESSURE: 78 MMHG | HEART RATE: 82 BPM | SYSTOLIC BLOOD PRESSURE: 124 MMHG

## 2022-11-16 DIAGNOSIS — Z23 ENCOUNTER FOR IMMUNIZATION: ICD-10-CM

## 2022-11-16 DIAGNOSIS — U07.1 COVID-19: ICD-10-CM

## 2022-11-16 PROCEDURE — 99212 OFFICE O/P EST SF 10 MIN: CPT

## 2022-11-16 RX ORDER — CLINDAMYCIN PHOSPHATE 10 MG/G
1 AEROSOL, FOAM TOPICAL
Qty: 50 | Refills: 0 | Status: DISCONTINUED | COMMUNITY
Start: 2022-10-03

## 2022-11-16 RX ORDER — SARECYCLINE HYDROCHLORIDE 150 MG/1
TABLET, COATED ORAL
Refills: 0 | Status: DISCONTINUED | COMMUNITY
End: 2022-11-16

## 2022-11-16 RX ORDER — NIRMATRELVIR AND RITONAVIR 300-100 MG
20 X 150 MG & KIT ORAL
Qty: 30 | Refills: 0 | Status: COMPLETED | COMMUNITY
Start: 2022-07-03

## 2022-11-16 NOTE — HISTORY OF PRESENT ILLNESS
[de-identified] : 57 year M with PMH MM in remission and HLD, presents for f/u. Pt denies CP/SOB, fever/chills, n/v/d/c.

## 2022-11-16 NOTE — PLAN
[FreeTextEntry1] : Discussed plan. Pt advised to sign up for [facility] portal to review labs and communicate any questions or concerns directly. Yearly physical and return as needed for illness, medication refills, and new or existing complaints. f/u 6 months for CPE.

## 2022-11-18 NOTE — ED PROVIDER NOTE - NS_ATTENDINGSCRIBE_ED_ALL_ED
I personally performed the service described in the documentation recorded by the scribe in my presence, and it accurately and completely records my words and actions.
Call bell/Fall precautions/Side rails

## 2022-12-06 NOTE — PATIENT PROFILE ADULT - DO YOU FEEL LIKE HURTING YOURSELF OR OTHERS?
[FreeTextEntry1] : Patient was instructed to target his T. Cholesterol to less than 200 mg/dl and LDL cholesterol to less than 70 mg/dl. His lipid parameters were mildly elevated. The HDL level was improved slightly and above 41 mg/dl.\par He wishes to try diet and exercise prior to starting lipid lowering medications.\par Exercise and weight loss was advised.\par Repeat 2D echo doppler was reviewed with the patient on his prior office visit. No AI was seen , mild TR was noted.\par EKG: NSR,rate of 66 bpm\par Patient was advised to repeat a BMP, CBC , fasting lipid profile and hepatic panel in 6 months.\par Follow up in 6 months\par 
no

## 2022-12-12 ENCOUNTER — RX RENEWAL (OUTPATIENT)
Age: 57
End: 2022-12-12

## 2022-12-19 ENCOUNTER — APPOINTMENT (OUTPATIENT)
Dept: HEMATOLOGY ONCOLOGY | Facility: CLINIC | Age: 57
End: 2022-12-19

## 2022-12-19 ENCOUNTER — RESULT REVIEW (OUTPATIENT)
Age: 57
End: 2022-12-19

## 2022-12-19 VITALS
HEIGHT: 70.51 IN | HEART RATE: 63 BPM | WEIGHT: 200.18 LBS | TEMPERATURE: 98.1 F | BODY MASS INDEX: 28.34 KG/M2 | RESPIRATION RATE: 16 BRPM | OXYGEN SATURATION: 97 % | DIASTOLIC BLOOD PRESSURE: 77 MMHG | SYSTOLIC BLOOD PRESSURE: 112 MMHG

## 2022-12-19 LAB
BASOPHILS # BLD AUTO: 0.01 K/UL — SIGNIFICANT CHANGE UP (ref 0–0.2)
BASOPHILS NFR BLD AUTO: 0.2 % — SIGNIFICANT CHANGE UP (ref 0–2)
EOSINOPHIL # BLD AUTO: 0.02 K/UL — SIGNIFICANT CHANGE UP (ref 0–0.5)
EOSINOPHIL NFR BLD AUTO: 0.3 % — SIGNIFICANT CHANGE UP (ref 0–6)
HCT VFR BLD CALC: 41.3 % — SIGNIFICANT CHANGE UP (ref 39–50)
HGB BLD-MCNC: 14.2 G/DL — SIGNIFICANT CHANGE UP (ref 13–17)
IMM GRANULOCYTES NFR BLD AUTO: 0.5 % — SIGNIFICANT CHANGE UP (ref 0–0.9)
LYMPHOCYTES # BLD AUTO: 0.86 K/UL — LOW (ref 1–3.3)
LYMPHOCYTES # BLD AUTO: 13.6 % — SIGNIFICANT CHANGE UP (ref 13–44)
MCHC RBC-ENTMCNC: 34.4 G/DL — SIGNIFICANT CHANGE UP (ref 32–36)
MCHC RBC-ENTMCNC: 34.6 PG — HIGH (ref 27–34)
MCV RBC AUTO: 100.7 FL — HIGH (ref 80–100)
MONOCYTES # BLD AUTO: 0.79 K/UL — SIGNIFICANT CHANGE UP (ref 0–0.9)
MONOCYTES NFR BLD AUTO: 12.5 % — SIGNIFICANT CHANGE UP (ref 2–14)
NEUTROPHILS # BLD AUTO: 4.6 K/UL — SIGNIFICANT CHANGE UP (ref 1.8–7.4)
NEUTROPHILS NFR BLD AUTO: 72.9 % — SIGNIFICANT CHANGE UP (ref 43–77)
NRBC # BLD: 0 /100 WBCS — SIGNIFICANT CHANGE UP (ref 0–0)
PLATELET # BLD AUTO: 187 K/UL — SIGNIFICANT CHANGE UP (ref 150–400)
RBC # BLD: 4.1 M/UL — LOW (ref 4.2–5.8)
RBC # FLD: 11.7 % — SIGNIFICANT CHANGE UP (ref 10.3–14.5)
WBC # BLD: 6.31 K/UL — SIGNIFICANT CHANGE UP (ref 3.8–10.5)
WBC # FLD AUTO: 6.31 K/UL — SIGNIFICANT CHANGE UP (ref 3.8–10.5)

## 2022-12-19 PROCEDURE — 99214 OFFICE O/P EST MOD 30 MIN: CPT

## 2022-12-22 LAB
ALBUMIN SERPL ELPH-MCNC: 4.4 G/DL
ALP BLD-CCNC: 97 U/L
ALT SERPL-CCNC: 28 U/L
ANION GAP SERPL CALC-SCNC: 10 MMOL/L
AST SERPL-CCNC: 28 U/L
B2 MICROGLOB SERPL-MCNC: 1.6 MG/L
BILIRUB SERPL-MCNC: 1.6 MG/DL
BUN SERPL-MCNC: 14 MG/DL
CALCIUM SERPL-MCNC: 9.2 MG/DL
CHLORIDE SERPL-SCNC: 104 MMOL/L
CO2 SERPL-SCNC: 28 MMOL/L
CREAT SERPL-MCNC: 0.99 MG/DL
EGFR: 88 ML/MIN/1.73M2
GLUCOSE SERPL-MCNC: 116 MG/DL
POTASSIUM SERPL-SCNC: 4.4 MMOL/L
PROT SERPL-MCNC: 6.1 G/DL
SODIUM SERPL-SCNC: 142 MMOL/L

## 2022-12-23 LAB
ALBUMIN MFR SERPL ELPH: 66.7 %
ALBUMIN SERPL-MCNC: 4.1 G/DL
ALBUMIN/GLOB SERPL: 2 RATIO
ALPHA1 GLOB MFR SERPL ELPH: 4.3 %
ALPHA1 GLOB SERPL ELPH-MCNC: 0.3 G/DL
ALPHA2 GLOB MFR SERPL ELPH: 10.5 %
ALPHA2 GLOB SERPL ELPH-MCNC: 0.6 G/DL
B-GLOBULIN MFR SERPL ELPH: 10.4 %
B-GLOBULIN SERPL ELPH-MCNC: 0.6 G/DL
DEPRECATED KAPPA LC FREE/LAMBDA SER: 1.73 RATIO
GAMMA GLOB FLD ELPH-MCNC: 0.5 G/DL
GAMMA GLOB MFR SERPL ELPH: 8.1 %
IGA SER QL IEP: 42 MG/DL
IGG SER QL IEP: 603 MG/DL
IGM SER QL IEP: 14 MG/DL
INTERPRETATION SERPL IEP-IMP: NORMAL
KAPPA LC CSF-MCNC: 0.3 MG/DL
KAPPA LC SERPL-MCNC: 0.52 MG/DL
M PROTEIN SPEC IFE-MCNC: NORMAL
PROT SERPL-MCNC: 6.1 G/DL
PROT SERPL-MCNC: 6.1 G/DL

## 2022-12-23 NOTE — CONSULT LETTER
[Dear  ___] : Dear ~ARELY, [Courtesy Letter:] : I had the pleasure of seeing your patient, [unfilled], in my office today. [Please see my note below.] : Please see my note below. [Referral Closing:] : Thank you very much for seeing this patient.  If you have any questions, please do not hesitate to contact me. [Sincerely,] : Sincerely, [FreeTextEntry3] : Pranav Hong DO, MSc\par , [facility]\par Attending Physician - Malignant Hematology\par [facility]\par [address] [address]\par Tel: [phone]\par Fax: [phone]

## 2022-12-23 NOTE — RESULTS/DATA
[FreeTextEntry1] : =========================================================================\par  EXAM: [id number] - PETCT WB ONC FDG SUBS  - ORDERED BY: ASIF HARTMANN\par \par PROCEDURE DATE:  04/30/2022  \par  \par INTERPRETATION:  PROCEDURE:  PET/CT WHOLE BODY (97097)\par \par CLINICAL INFORMATION: 57-year-old male diagnosed with multiple myeloma in \par 2019, status post therapy. PET/CT is done as part of subsequent treatment \par strategy evaluation.\par \par RADIOPHARMACEUTICAL:  11.64 mCi F-18, FDG, I.V.\par \par TECHNIQUE:  Fasting blood sugar measured prior to injection of \par radiopharmaceutical was 78 mg/dl. Following intravenous injection of the \par radiopharmaceutical and an uptake period of approximately 60 minutes, \par FDG-PET/CT imaging was performed on a Siemens Biograph mCT 64 PET-CT from \par vertex of the skull to the toes. Oral contrast was given during the \par uptake period. CT was performed during shallow respiration. The CT \par protocol was optimized for PET attenuation correction and anatomic \par localization. The CT protocol was not designed to produce and cannot \par replace state-of-the-art diagnostic CT images with specific imaging \par protocols for different body parts and indications. Images were reviewed \par on a dedicated workstation using multiplanar reconstruction.\par \par The standardized uptake values (SUV) are normalized to patient body \par weight and indicate the highest activity concentration (SUVmax) in a \par given disease site. All image numbers refer to axial image number.\par \par COMPARISON:  No prior PET/CT is available for prior PET/CT..\par \par OTHER STUDIES USED FOR CORRELATION: MRI of lumbar spine from 2/7/2022 and \par CT angiogram chest from 2/6/2022.\par \par FINDINGS:\par \par HEAD/NECK: Physiologic FDG activity in the brain, major salivary glands, \par and neck muscles.\par CHEST: No abnormal FDG activity. No lymphadenopathy.\par LUNGS: No abnormal FDG activity. No nodule.\par PLEURA/PERICARDIUM:Physiologic FDG activity. No pleural or pericardial \par effusion.\par \par HEPATOBILIARY/PANCREAS: Physiologic FDG activity. Liver SUV mean is 2.7.\par SPLEEN: Physiologic FDG activity. Normal in size.\par ADRENAL GLANDS: No abnormal FDG activity. No pleural or pericardial \par effusion.\par KIDNEYS/URINARY BLADDER: Physiologic excreted FDG activity.\par PELVIC ORGANS: No abnormal FDG activity.\par ABDOMINOPELVIC NODES: No enlarged or FDG avid lymph nodes.\par BOWEL/PERITONEUM/MESENTERY: No abnormal FDG activity.\par BONES/SOFT TISSUES:Nonspecific heterogeneous FDG activity in the spinous \par processes of L3 to L5 with no corresponding abnormalities on CT, and \par associated overlying soft tissue stranding (SUV 9.7; image 251).\par \par IMPRESSION:  FDG-PET/CT scan demonstrates:\par \par Nonspecific heterogeneous FDG activity in the spinous processes of L3-L5 without CT correlate, with associated mildly FDG avid overlying soft tissue stranding. This may be secondary to post traumatic changes. Please correlate clinically.\par \par Direct comparison will be performed and an addendum issued when prior PET/CT is made available.\par \par --- End of Report ---\par \par BEN LOYA MD; Attending Nuclear Medicine \par This document has been electronically signed. May  1 2022  2:57PM\par \par =========================================================================

## 2022-12-23 NOTE — ASSESSMENT
[FreeTextEntry1] : 58 yo male with high risk IgA lambda Multiple Myeloma (Dx 2019) currently in a morphologic and serologic CR at last assessment here for further monitoring and management. He has been on 3 different treatment / maintenance regimens (including VRd, KRd, Venetoclax + Ninlaro) all in the front-line setting. 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 His last TTE in Feb 2022 showed heart failure with reduced ejection fraction at 39%. He had been off all maintenance therapy since 11/10/2021 but restarted again in June 2022.\par \par During our first visit on 3/30/22 we ordered immunoelectrophoresis studies which did not detect a monoclonal protein or elevated FLC in either the serum or urine. A repeat PET-CT was performed on 4/30/22 which did not show any areas of disease. In addition we performed a BM biopsy and aspirate on 4/8/22 in which no morphologic, cytogenetic, or immunophenotypic features of plasma cell myeloma were identified thus showing continued CR. Clonoseq MRD testing was sent on the first 1.5 - 2 mL aspirate pull which showed the presence of a residual sequence below the limit of detection (">0-1 residual clonal cells per million nucleated cell"). Only 1 of the original 3 sequences was detected (IGH Sequence A). Sequences for IGK and IGL patient-specific mutations were not detected.\par \par He continues to be co-managed by Dr Chapin at [facility]. In June 2022, he was started back on maintenance Ninlaro and Venetoclax with the total goal of 5 years of therapy. In addition, he will continue to undergo annual PET-CT and clonoseq MRD testing with the next testing planned in April 2023. Dr Chapin will continue to be involved in Mr Marie care and plans to see him every 6 months via telemedicine.\par \par Plan:\par - Repeat myeloma panel (Pre-V) once every 8-12 weeks. CBCs at least every 4 weeks given he is on Venetoclax and Ninlaro therapy\par - Asymptomatic increase in T-bili on CMP related to Gilbert syndrome, no action required\par - Prefer serial BM monitoring (peripheral blood has reduced sensitivity) with clonoseq MRD testing every 12 months, next due in April 2023\par - Healthcare Maintenance: COVID-19 vaccinated, continue with booster doses per current CDC guidelines, Pneumonia vaccinated with Prevnar 20. Completed shingles vaccination. Annual derm visit recommended.\par - Send note, BM reports and PET-CT reports to Dr Chapin's Office fax [phone]  \par - Follow up in 8-12 weeks\par \par ___\par I personally have spent a total of 30 minutes of time on the date of this encounter reviewing test results, documenting findings, providing education, coordinating care and directly consulting with the patient.

## 2022-12-23 NOTE — HISTORY OF PRESENT ILLNESS
[Disease:__________________________] : Disease: [unfilled] [Treatment Protocol] : Treatment Protocol [Date: ____________] : Patient's last distress assessment performed on [unfilled]. 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 Fib / A. Flutter (post-surgical, s/p cardioversion, not on AC), lumbar radiculopathy (s/p lumbar microdiscectomy 3/15/22) here for further management of his high-risk (TP53 gene deletion, del13q, t(11;14)) IgA Lambda multiple myeloma initially diagnosed in 2019. He presents here as a transfer of care related to change in insurance from [facility] system where he was most recently followed by Dr Mian Chapin.\par \par He was diagnosed with IgA lambda multiple myeloma with anemia (no bone lesions - on skeletal survey, renal issues or hypercalcemia) in May 2019. He was started on Revlimid, Velcade and Dexamethasone (RVd) in June 2019 under the care of Dr Sharif Arteaga (oncologist associated with [facility]). He reports he developed eye styes / chalazia from Velcade and subsequently discontinued the medication. He may have had a brief time on Kyprolis with Rev and Dex (he believes it was started by Dr Chapin), however he developed severe back spasms on the Revlimid and this led to its discontinuation. He also had insomnia while taking steroids. He switched his care over to [facility] in Oct 2019.\par \par In January 2020 he had a repeat BM biopsy and was found to have a CR with undetectable M-spike and normalization of his FLC. During this time he was being seen by Dr Chapin at [facility], who advised against autologous stem cell transplantation in first CR. He then underwent stem cell collection (stored at a cryobank at [facility]) and he was subsequently started on therapy with Ninlaro and Venetoclax maintenance (still in the front-line setting). 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 His maintenance therapy was discontinued Nov 10, 2021 and has yet to be restarted. His LVEF while on RVd in Oct 2019 was estimated at 69% ([facility]). His most recent LVEF checked at [facility] in Feb 2022 was reduced down to 39%. He has been following cardiology for optimization of his heart failure medications, for which he is taking metoprolol and losartan.\par \par Since achieving a CR in 2020, imaging with PET-CT and Bone marrow biopsies up to this point have been negative for residual disease and lesions. His last BM biopsy was in June 2021 at [facility]. He did have clonoseq testing for the ID sample, but has yet to have any MRD measurements. He was previously on Ninlaro 3 mg once weekly for 3/4 weeks every 28 day cycle and venetoclax was taken at 400 mg PO 5/7 days a week of every 28 day cycle.\par \par He reports that he had back surgery in March 2022. He was having back pain which is when they discovered he has herniated disks L4 and L5 and underwent microdiscectomy on 3/15/22. He has had a change in insurance to [employer], which is no longer covered at [facility] and he came to [facility] to discuss further management.\par \par Today he overall feels well. He has not experienced any changes in his health since his back surgery. He is currently not on any myeloma-directed therapy. He denies any new bone pain, fevers, chills, night sweats, neuropathic pain, urinary / kidney issues.\par \par ==================================================================\par Treatment Hx:\par \par VRd - unspecified number of cycles, started June 2019\par KRd? - unclear\par Ninlaro + Venetoclax (last dose 11/10/2021)\par \par ==================================================================\par Care Providers:\par \par PMD: Dr Apolinar Ballard ([facility])\par Cardiology: Dr Gurwinder Roblero ([facility])\par Oncologist / Hematologist: Dr Mian Chapin ([facility]) Tel: [phone], Fax: [phone]  \par \par ================================================================== [de-identified] : ? [de-identified] : Outside pathology at [facility]:\par BM biopsy and aspirate May 2019: \par BM core - numerous plasma cells estimated up to 70%.\par BM aspirate - 40% lambda restricted plasma cells\par \par FISH: t(11;14) CCND1-IGH gene in 45% of cells, deletion 13q14.3 region in 40% of cells and TP53 gene deletion in 28% of cells. [de-identified] : 3/30/22: Initial visit.\par \par 5/25/22: Follow-up. He has been feeling well overall. He has joined the gym 3 weeks ago and has been more active. He recently went on a cruise without any limitations in his activities. He feels he has more energy lately. His next echocardiogram is in September. No new lumps or bumps.  Weight is stable and appetite is good. He denies any constitutional complaints. He has a planned virtual appointment with Dr. Mian Chapin on 6/21/22.\par \par 8/15/22: Follow-up. He is feeling well overall. He has been swimming often at either [employer] or town pools. No issues with exercise tolerance. No chest pain, palpitations or shortness of breath.  He is seeing the cardiologist at the end of September. He takes Ninlaro on Wednesdays once a week for 3 weeks on and 1 week off. No side effects to the medications besides belching. No bleeding complications or bruising. No blood in the stool, no bleeding from gums. No new bone pains. No weight loss. \par \par 10/24/22: Follow-up. No complaints. Has gained some weight. No side effects from medications. No recent illnesses.\par \par 12/19/22: Follow-up. He continues to have chronic back pain related to degenerative changes.Otherwise no significant changes in health. No medication changes. Will start [employer] work again as an  this tax season. He continues to tolerate treatment well without any significant adverse effects or cytopenias.\par \par ____\par A comprehensive review of systems was performed including constitutional, eyes, ENT, cardiovascular, respiratory, gastrointestinal, genitourinary, musculoskeletal, integumentary, neurological, psychiatric and hematologic / lymphatic. All pertinent positives are included in the H&P under interval history above and the remaining review of systems listed are negative. \par \par ==========================================================\par Treatment Hx: \par \par Maintenance with Ninlaro (weekly day 1,8,15) + Venetoclax (M-F) on 28-day cycles\par - Started back March 2022\par \par ========================================================== [FreeTextEntry1] : Ninlaro 3 weeks on and 1 week off + Venetoclax 400 mg M-F (Sat and Sun off) continuously

## 2023-01-03 ENCOUNTER — RX RENEWAL (OUTPATIENT)
Age: 58
End: 2023-01-03

## 2023-01-19 ENCOUNTER — OUTPATIENT (OUTPATIENT)
Dept: OUTPATIENT SERVICES | Facility: HOSPITAL | Age: 58
LOS: 1 days | Discharge: HOME | End: 2023-01-19

## 2023-01-19 DIAGNOSIS — C90.00 MULTIPLE MYELOMA NOT HAVING ACHIEVED REMISSION: ICD-10-CM

## 2023-01-24 ENCOUNTER — RESULT REVIEW (OUTPATIENT)
Age: 58
End: 2023-01-24

## 2023-01-24 ENCOUNTER — APPOINTMENT (OUTPATIENT)
Dept: HEMATOLOGY ONCOLOGY | Facility: CLINIC | Age: 58
End: 2023-01-24

## 2023-01-24 LAB
BASOPHILS # BLD AUTO: 0.01 K/UL — SIGNIFICANT CHANGE UP (ref 0–0.2)
BASOPHILS NFR BLD AUTO: 0.2 % — SIGNIFICANT CHANGE UP (ref 0–2)
EOSINOPHIL # BLD AUTO: 0.01 K/UL — SIGNIFICANT CHANGE UP (ref 0–0.5)
EOSINOPHIL NFR BLD AUTO: 0.2 % — SIGNIFICANT CHANGE UP (ref 0–6)
HCT VFR BLD CALC: 40.8 % — SIGNIFICANT CHANGE UP (ref 39–50)
HGB BLD-MCNC: 14.3 G/DL — SIGNIFICANT CHANGE UP (ref 13–17)
IMM GRANULOCYTES NFR BLD AUTO: 0.6 % — SIGNIFICANT CHANGE UP (ref 0–0.9)
LYMPHOCYTES # BLD AUTO: 0.81 K/UL — LOW (ref 1–3.3)
LYMPHOCYTES # BLD AUTO: 17.4 % — SIGNIFICANT CHANGE UP (ref 13–44)
MCHC RBC-ENTMCNC: 34.2 PG — HIGH (ref 27–34)
MCHC RBC-ENTMCNC: 35 G/DL — SIGNIFICANT CHANGE UP (ref 32–36)
MCV RBC AUTO: 97.6 FL — SIGNIFICANT CHANGE UP (ref 80–100)
MONOCYTES # BLD AUTO: 0.56 K/UL — SIGNIFICANT CHANGE UP (ref 0–0.9)
MONOCYTES NFR BLD AUTO: 12 % — SIGNIFICANT CHANGE UP (ref 2–14)
NEUTROPHILS # BLD AUTO: 3.24 K/UL — SIGNIFICANT CHANGE UP (ref 1.8–7.4)
NEUTROPHILS NFR BLD AUTO: 69.6 % — SIGNIFICANT CHANGE UP (ref 43–77)
NRBC # BLD: 0 /100 WBCS — SIGNIFICANT CHANGE UP (ref 0–0)
PLATELET # BLD AUTO: 166 K/UL — SIGNIFICANT CHANGE UP (ref 150–400)
RBC # BLD: 4.18 M/UL — LOW (ref 4.2–5.8)
RBC # FLD: 11.6 % — SIGNIFICANT CHANGE UP (ref 10.3–14.5)
WBC # BLD: 4.66 K/UL — SIGNIFICANT CHANGE UP (ref 3.8–10.5)
WBC # FLD AUTO: 4.66 K/UL — SIGNIFICANT CHANGE UP (ref 3.8–10.5)

## 2023-02-23 ENCOUNTER — RESULT REVIEW (OUTPATIENT)
Age: 58
End: 2023-02-23

## 2023-02-23 ENCOUNTER — APPOINTMENT (OUTPATIENT)
Dept: HEMATOLOGY ONCOLOGY | Facility: CLINIC | Age: 58
End: 2023-02-23

## 2023-02-23 LAB
BASOPHILS # BLD AUTO: 0 K/UL — SIGNIFICANT CHANGE UP (ref 0–0.2)
BASOPHILS NFR BLD AUTO: 0 % — SIGNIFICANT CHANGE UP (ref 0–2)
EOSINOPHIL # BLD AUTO: 0 K/UL — SIGNIFICANT CHANGE UP (ref 0–0.5)
EOSINOPHIL NFR BLD AUTO: 0 % — SIGNIFICANT CHANGE UP (ref 0–6)
HCT VFR BLD CALC: 44.1 % — SIGNIFICANT CHANGE UP (ref 39–50)
HGB BLD-MCNC: 15.4 G/DL — SIGNIFICANT CHANGE UP (ref 13–17)
IMM GRANULOCYTES NFR BLD AUTO: 0.3 % — SIGNIFICANT CHANGE UP (ref 0–0.9)
LYMPHOCYTES # BLD AUTO: 0.4 K/UL — LOW (ref 1–3.3)
LYMPHOCYTES # BLD AUTO: 4.6 % — LOW (ref 13–44)
MCHC RBC-ENTMCNC: 34 PG — SIGNIFICANT CHANGE UP (ref 27–34)
MCHC RBC-ENTMCNC: 34.9 G/DL — SIGNIFICANT CHANGE UP (ref 32–36)
MCV RBC AUTO: 97.4 FL — SIGNIFICANT CHANGE UP (ref 80–100)
MONOCYTES # BLD AUTO: 0.05 K/UL — SIGNIFICANT CHANGE UP (ref 0–0.9)
MONOCYTES NFR BLD AUTO: 0.6 % — LOW (ref 2–14)
NEUTROPHILS # BLD AUTO: 8.13 K/UL — HIGH (ref 1.8–7.4)
NEUTROPHILS NFR BLD AUTO: 94.5 % — HIGH (ref 43–77)
NRBC # BLD: 0 /100 WBCS — SIGNIFICANT CHANGE UP (ref 0–0)
PLATELET # BLD AUTO: 171 K/UL — SIGNIFICANT CHANGE UP (ref 150–400)
RBC # BLD: 4.53 M/UL — SIGNIFICANT CHANGE UP (ref 4.2–5.8)
RBC # FLD: 11.1 % — SIGNIFICANT CHANGE UP (ref 10.3–14.5)
WBC # BLD: 8.61 K/UL — SIGNIFICANT CHANGE UP (ref 3.8–10.5)
WBC # FLD AUTO: 8.61 K/UL — SIGNIFICANT CHANGE UP (ref 3.8–10.5)

## 2023-03-15 ENCOUNTER — APPOINTMENT (OUTPATIENT)
Dept: HEMATOLOGY ONCOLOGY | Facility: CLINIC | Age: 58
End: 2023-03-15
Payer: COMMERCIAL

## 2023-03-15 ENCOUNTER — RESULT REVIEW (OUTPATIENT)
Age: 58
End: 2023-03-15

## 2023-03-15 ENCOUNTER — APPOINTMENT (OUTPATIENT)
Dept: HEMATOLOGY ONCOLOGY | Facility: CLINIC | Age: 58
End: 2023-03-15

## 2023-03-15 VITALS
BODY MASS INDEX: 28.06 KG/M2 | TEMPERATURE: 96.4 F | SYSTOLIC BLOOD PRESSURE: 113 MMHG | OXYGEN SATURATION: 96 % | WEIGHT: 198.39 LBS | DIASTOLIC BLOOD PRESSURE: 77 MMHG | HEART RATE: 71 BPM | RESPIRATION RATE: 16 BRPM

## 2023-03-15 LAB
BASOPHILS # BLD AUTO: 0 K/UL — SIGNIFICANT CHANGE UP (ref 0–0.2)
BASOPHILS NFR BLD AUTO: 0 % — SIGNIFICANT CHANGE UP (ref 0–2)
EOSINOPHIL # BLD AUTO: 0.01 K/UL — SIGNIFICANT CHANGE UP (ref 0–0.5)
EOSINOPHIL NFR BLD AUTO: 0.2 % — SIGNIFICANT CHANGE UP (ref 0–6)
HCT VFR BLD CALC: 40.8 % — SIGNIFICANT CHANGE UP (ref 39–50)
HGB BLD-MCNC: 14.1 G/DL — SIGNIFICANT CHANGE UP (ref 13–17)
IMM GRANULOCYTES NFR BLD AUTO: 0.5 % — SIGNIFICANT CHANGE UP (ref 0–0.9)
LYMPHOCYTES # BLD AUTO: 0.69 K/UL — LOW (ref 1–3.3)
LYMPHOCYTES # BLD AUTO: 16.4 % — SIGNIFICANT CHANGE UP (ref 13–44)
MCHC RBC-ENTMCNC: 34.6 G/DL — SIGNIFICANT CHANGE UP (ref 32–36)
MCHC RBC-ENTMCNC: 34.7 PG — HIGH (ref 27–34)
MCV RBC AUTO: 100.5 FL — HIGH (ref 80–100)
MONOCYTES # BLD AUTO: 0.54 K/UL — SIGNIFICANT CHANGE UP (ref 0–0.9)
MONOCYTES NFR BLD AUTO: 12.8 % — SIGNIFICANT CHANGE UP (ref 2–14)
NEUTROPHILS # BLD AUTO: 2.96 K/UL — SIGNIFICANT CHANGE UP (ref 1.8–7.4)
NEUTROPHILS NFR BLD AUTO: 70.1 % — SIGNIFICANT CHANGE UP (ref 43–77)
NRBC # BLD: 0 /100 WBCS — SIGNIFICANT CHANGE UP (ref 0–0)
PLATELET # BLD AUTO: 151 K/UL — SIGNIFICANT CHANGE UP (ref 150–400)
RBC # BLD: 4.06 M/UL — LOW (ref 4.2–5.8)
RBC # FLD: 11.4 % — SIGNIFICANT CHANGE UP (ref 10.3–14.5)
WBC # BLD: 4.22 K/UL — SIGNIFICANT CHANGE UP (ref 3.8–10.5)
WBC # FLD AUTO: 4.22 K/UL — SIGNIFICANT CHANGE UP (ref 3.8–10.5)

## 2023-03-15 PROCEDURE — 99214 OFFICE O/P EST MOD 30 MIN: CPT

## 2023-03-16 ENCOUNTER — TRANSCRIPTION ENCOUNTER (OUTPATIENT)
Age: 58
End: 2023-03-16

## 2023-03-21 NOTE — ASSESSMENT
[FreeTextEntry1] : 56 yo male with high risk IgA lambda Multiple Myeloma (Dx 2019) currently in a morphologic and serologic CR at last assessment here for further monitoring and management. He has been on 3 different treatment / maintenance regimens (including VRd, KRd, Venetoclax + Ninlaro) all in the front-line setting. 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 His last TTE in Feb 2022 showed heart failure with reduced ejection fraction at 39%. He had been off all maintenance therapy since 11/10/2021 but restarted again in June 2022.\par \par During our first visit on 3/30/22 we ordered immunoelectrophoresis studies which did not detect a monoclonal protein or elevated FLC in either the serum or urine. A repeat PET-CT was performed on 4/30/22 which did not show any areas of disease. In addition we performed a BM biopsy and aspirate on 4/8/22 in which no morphologic, cytogenetic, or immunophenotypic features of plasma cell myeloma were identified thus showing continued CR. Clonoseq MRD testing was sent on the first 1.5 - 2 mL aspirate pull which showed the presence of a residual sequence below the limit of detection (">0-1 residual clonal cells per million nucleated cell"). Only 1 of the original 3 sequences was detected (IGH Sequence A). Sequences for IGK and IGL patient-specific mutations were not detected.\par \par He continues to be co-managed by Dr Chapin at [place]. In June 2022, he was started back on maintenance Ninlaro and Venetoclax with the total goal of 5 years of therapy. In addition, he will continue to undergo annual PET-CT and clonoseq MRD testing with the next testing planned in April 2023. Dr Chapin will continue to be involved in Mr Marie care and plans to see him every 6 months via telemedicine.\par \par Plan:\par - Repeat myeloma panel (Pre-V) once every 8-12 weeks. \par - CBCs at least every 4 weeks due to therapy\par - Continue Venetoclax and Ninlaro therapy as above\par - Asymptomatic increase in T-bili on CMP related to Gilbert syndrome, no action required\par - Prefer serial BM monitoring (peripheral blood has reduced sensitivity) with clonoseq MRD testing every 12 months, next due in April 2023\par - Healthcare Maintenance: COVID-19 vaccinated, continue with booster doses per current CDC guidelines, Pneumonia vaccinated with Prevnar 20. Completed shingles vaccination. Annual derm visit recommended.\par - Send note, BM reports and PET-CT reports to Dr Chapin's Office fax [phone]  \par - Follow up in 8-12 weeks\par \par ___\par I personally have spent a total of 30 minutes of time on the date of this encounter reviewing test results, documenting findings, providing education, coordinating care and directly consulting with the patient.

## 2023-03-21 NOTE — RESULTS/DATA
[FreeTextEntry1] : =========================================================================\par  EXAM: [id number] - PETCT WB ONC FDG SUBS  - ORDERED BY: ASIF HARTMANN\par \par PROCEDURE DATE:  04/30/2022  \par  \par INTERPRETATION:  PROCEDURE:  PET/CT WHOLE BODY ([id number])\par \par CLINICAL INFORMATION: 57-year-old male diagnosed with multiple myeloma in \par 2019, status post therapy. PET/CT is done as part of subsequent treatment \par strategy evaluation.\par \par RADIOPHARMACEUTICAL:  11.64 mCi F-18, FDG, I.V.\par \par TECHNIQUE:  Fasting blood sugar measured prior to injection of \par radiopharmaceutical was 78 mg/dl. Following intravenous injection of the \par radiopharmaceutical and an uptake period of approximately 60 minutes, \par FDG-PET/CT imaging was performed on a Siemens Biograph mCT 64 PET-CT from \par vertex of the skull to the toes. Oral contrast was given during the \par uptake period. CT was performed during shallow respiration. The CT \par protocol was optimized for PET attenuation correction and anatomic \par localization. The CT protocol was not designed to produce and cannot \par replace state-of-the-art diagnostic CT images with specific imaging \par protocols for different body parts and indications. Images were reviewed \par on a dedicated workstation using multiplanar reconstruction.\par \par The standardized uptake values (SUV) are normalized to patient body \par weight and indicate the highest activity concentration (SUVmax) in a \par given disease site. All image numbers refer to axial image number.\par \par COMPARISON:  No prior PET/CT is available for prior PET/CT..\par \par OTHER STUDIES USED FOR CORRELATION: MRI of lumbar spine from 2/7/2022 and \par CT angiogram chest from 2/6/2022.\par \par FINDINGS:\par \par HEAD/NECK: Physiologic FDG activity in the brain, major salivary glands, \par and neck muscles.\par CHEST: No abnormal FDG activity. No lymphadenopathy.\par LUNGS: No abnormal FDG activity. No nodule.\par PLEURA/PERICARDIUM:Physiologic FDG activity. No pleural or pericardial \par effusion.\par \par HEPATOBILIARY/PANCREAS: Physiologic FDG activity. Liver SUV mean is 2.7.\par SPLEEN: Physiologic FDG activity. Normal in size.\par ADRENAL GLANDS: No abnormal FDG activity. No pleural or pericardial \par effusion.\par KIDNEYS/URINARY BLADDER: Physiologic excreted FDG activity.\par PELVIC ORGANS: No abnormal FDG activity.\par ABDOMINOPELVIC NODES: No enlarged or FDG avid lymph nodes.\par BOWEL/PERITONEUM/MESENTERY: No abnormal FDG activity.\par BONES/SOFT TISSUES:Nonspecific heterogeneous FDG activity in the spinous \par processes of L3 to L5 with no corresponding abnormalities on CT, and \par associated overlying soft tissue stranding (SUV 9.7; image 251).\par \par IMPRESSION:  FDG-PET/CT scan demonstrates:\par \par Nonspecific heterogeneous FDG activity in the spinous processes of L3-L5 without CT correlate, with associated mildly FDG avid overlying soft tissue stranding. This may be secondary to post traumatic changes. Please correlate clinically.\par \par Direct comparison will be performed and an addendum issued when prior PET/CT is made available.\par \par --- End of Report ---\par \par BEN LOYA MD; Attending Nuclear Medicine \par This document has been electronically signed. May  1 2022  2:57PM\par \par =========================================================================

## 2023-03-21 NOTE — HISTORY OF PRESENT ILLNESS
[Disease:__________________________] : Disease: [unfilled] [Treatment Protocol] : Treatment Protocol [Date: ____________] : Patient's last distress assessment performed on [unfilled]. 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 Fib / A. Flutter (post-surgical, s/p cardioversion, not on AC), lumbar radiculopathy (s/p lumbar microdiscectomy 3/15/22) here for further management of his high-risk (TP53 gene deletion, del13q, t(11;14)) IgA Lambda multiple myeloma initially diagnosed in 2019. He presents here as a transfer of care related to change in insurance from [facility] system where he was most recently followed by Dr Mian Chapin.\par \par He was diagnosed with IgA lambda multiple myeloma with anemia (no bone lesions - on skeletal survey, renal issues or hypercalcemia) in May 2019. He was started on Revlimid, Velcade and Dexamethasone (RVd) in June 2019 under the care of Dr Sharif Arteaga (oncologist associated with [facility]). He reports he developed eye styes / chalazia from Velcade and subsequently discontinued the medication. He may have had a brief time on Kyprolis with Rev and Dex (he believes it was started by Dr Chapin), however he developed severe back spasms on the Revlimid and this led to its discontinuation. He also had insomnia while taking steroids. He switched his care over to [facility] in Oct 2019.\par \par In January 2020 he had a repeat BM biopsy and was found to have a CR with undetectable M-spike and normalization of his FLC. During this time he was being seen by Dr Chapin at [facility], who advised against autologous stem cell transplantation in first CR. He then underwent stem cell collection (stored at a cryobank at [facility]) and he was subsequently started on therapy with Ninlaro and Venetoclax maintenance (still in the front-line setting). 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 His maintenance therapy was discontinued Nov 10, 2021 and has yet to be restarted. His LVEF while on RVd in Oct 2019 was estimated at 69% ([facility]). His most recent LVEF checked at [facility] in Feb 2022 was reduced down to 39%. He has been following cardiology for optimization of his heart failure medications, for which he is taking metoprolol and losartan.\par \par Since achieving a CR in 2020, imaging with PET-CT and Bone marrow biopsies up to this point have been negative for residual disease and lesions. His last BM biopsy was in June 2021 at [facility]. He did have clonoseq testing for the ID sample, but has yet to have any MRD measurements. He was previously on Ninlaro 3 mg once weekly for 3/4 weeks every 28 day cycle and venetoclax was taken at 400 mg PO 5/7 days a week of every 28 day cycle.\par \par He reports that he had back surgery in March 2022. He was having back pain which is when they discovered he has herniated disks L4 and L5 and underwent microdiscectomy on 3/15/22. He has had a change in insurance to [facility], which is no longer covered at [facility] and he came to [facility] to discuss further management.\par \par Today he overall feels well. He has not experienced any changes in his health since his back surgery. He is currently not on any myeloma-directed therapy. He denies any new bone pain, fevers, chills, night sweats, neuropathic pain, urinary / kidney issues.\par \par ==================================================================\par Treatment Hx:\par \par VRd - unspecified number of cycles, started June 2019\par KRd? - unclear\par Ninlaro + Venetoclax (last dose 11/10/2021)\par \par ==================================================================\par Care Providers:\par \par PMD: Dr Apolinar Ballard ([facility])\par Cardiology: Dr Gurwinder Roblero ([facility])\par Oncologist / Hematologist: Dr Mian Chapin ([facility]) Tel: [phone], Fax: [phone]  \par \par ================================================================== [de-identified] : ? [de-identified] : Outside pathology at [facility]:\par BM biopsy and aspirate May 2019: \par BM core - numerous plasma cells estimated up to 70%.\par BM aspirate - 40% lambda restricted plasma cells\par \par FISH: t(11;14) CCND1-IGH gene in 45% of cells, deletion 13q14.3 region in 40% of cells and TP53 gene deletion in 28% of cells. [FreeTextEntry1] : Ninlaro 3 weeks on and 1 week off + Venetoclax 400 mg M-F (Sat and Sun off) continuously [de-identified] : 3/30/22: Initial visit.\par \par 5/25/22: Follow-up. He has been feeling well overall. He has joined the gym 3 weeks ago and has been more active. He recently went on a cruise without any limitations in his activities. He feels he has more energy lately. His next echocardiogram is in September. No new lumps or bumps.  Weight is stable and appetite is good. He denies any constitutional complaints. He has a planned virtual appointment with Dr. Mian Chapin on 6/21/22.\par \par 8/15/22: Follow-up. He is feeling well overall. He has been swimming often at either [employer] or town pools. No issues with exercise tolerance. No chest pain, palpitations or shortness of breath.  He is seeing the cardiologist at the end of September. He takes Ninlaro on Wednesdays once a week for 3 weeks on and 1 week off. No side effects to the medications besides belching. No bleeding complications or bruising. No blood in the stool, no bleeding from gums. No new bone pains. No weight loss. \par \par 10/24/22: Follow-up. No complaints. Has gained some weight. No side effects from medications. No recent illnesses.\par \par 12/19/22: Follow-up. He continues to have chronic back pain related to degenerative changes.Otherwise no significant changes in health. No medication changes. Will start [employer] work again as an  this tax season. He continues to tolerate treatment well without any significant adverse effects or cytopenias.\par \par 3/15/23: Follow-up. He reports continued chronic back pain. No new bone pains. He also has fatigue. He has not experienced any constitutional issues. He has gained weight. No recent illnesses. His blood counts remain stable on therapy.\par \par A comprehensive review of systems was performed including constitutional, eyes, ENT, cardiovascular, respiratory, gastrointestinal, genitourinary, musculoskeletal, integumentary, neurological, psychiatric and hematologic / lymphatic. All pertinent positives are included in the H&P under interval history above and the remaining review of systems listed are negative. \par \par ==========================================================\par Treatment Hx: \par \par Maintenance with Ninlaro (weekly day 1,8,15) + Venetoclax (M-F) on 28-day cycles\par - Started back March 2022\par \par ==========================================================

## 2023-03-21 NOTE — CONSULT LETTER
[Dear  ___] : Dear ~ARELY, [Courtesy Letter:] : I had the pleasure of seeing your patient, [unfilled], in my office today. [Please see my note below.] : Please see my note below. [Referral Closing:] : Thank you very much for seeing this patient.  If you have any questions, please do not hesitate to contact me. [Sincerely,] : Sincerely, [FreeTextEntry3] : Pranav Hong DO, MSc\par , [facility]\par Attending Physician - Malignant Hematology\par [facility]\par [address] [address]\par Tel: [phone]\par Fax: [phone]

## 2023-03-23 LAB
ALBUMIN MFR SERPL ELPH: 68.9 %
ALBUMIN SERPL ELPH-MCNC: 4.6 G/DL
ALBUMIN SERPL-MCNC: 4.3 G/DL
ALBUMIN/GLOB SERPL: 2.2 RATIO
ALP BLD-CCNC: 80 U/L
ALPHA1 GLOB MFR SERPL ELPH: 3.6 %
ALPHA1 GLOB SERPL ELPH-MCNC: 0.2 G/DL
ALPHA2 GLOB MFR SERPL ELPH: 9.7 %
ALPHA2 GLOB SERPL ELPH-MCNC: 0.6 G/DL
ALT SERPL-CCNC: 30 U/L
ANION GAP SERPL CALC-SCNC: 10 MMOL/L
AST SERPL-CCNC: 29 U/L
B-GLOBULIN MFR SERPL ELPH: 10.4 %
B-GLOBULIN SERPL ELPH-MCNC: 0.7 G/DL
B2 MICROGLOB SERPL-MCNC: 1.5 MG/L
BILIRUB SERPL-MCNC: 2.5 MG/DL
BUN SERPL-MCNC: 18 MG/DL
CALCIUM SERPL-MCNC: 9.5 MG/DL
CHLORIDE SERPL-SCNC: 105 MMOL/L
CO2 SERPL-SCNC: 27 MMOL/L
CREAT SERPL-MCNC: 1 MG/DL
DEPRECATED KAPPA LC FREE/LAMBDA SER: 1.75 RATIO
EGFR: 87 ML/MIN/1.73M2
GAMMA GLOB FLD ELPH-MCNC: 0.5 G/DL
GAMMA GLOB MFR SERPL ELPH: 7.4 %
GLUCOSE SERPL-MCNC: 97 MG/DL
IGA SER QL IEP: 40 MG/DL
IGG SER QL IEP: 572 MG/DL
IGM SER QL IEP: 21 MG/DL
INTERPRETATION SERPL IEP-IMP: NORMAL
KAPPA LC CSF-MCNC: 0.28 MG/DL
KAPPA LC SERPL-MCNC: 0.49 MG/DL
M PROTEIN SPEC IFE-MCNC: NORMAL
POTASSIUM SERPL-SCNC: 4.1 MMOL/L
PROT SERPL-MCNC: 6.3 G/DL
SODIUM SERPL-SCNC: 142 MMOL/L

## 2023-04-21 ENCOUNTER — OUTPATIENT (OUTPATIENT)
Dept: OUTPATIENT SERVICES | Facility: HOSPITAL | Age: 58
LOS: 1 days | Discharge: HOME | End: 2023-04-21

## 2023-04-21 DIAGNOSIS — C90.00 MULTIPLE MYELOMA NOT HAVING ACHIEVED REMISSION: ICD-10-CM

## 2023-04-26 ENCOUNTER — APPOINTMENT (OUTPATIENT)
Dept: HEMATOLOGY ONCOLOGY | Facility: CLINIC | Age: 58
End: 2023-04-26
Payer: COMMERCIAL

## 2023-04-26 ENCOUNTER — RESULT REVIEW (OUTPATIENT)
Age: 58
End: 2023-04-26

## 2023-04-26 ENCOUNTER — LABORATORY RESULT (OUTPATIENT)
Age: 58
End: 2023-04-26

## 2023-04-26 ENCOUNTER — APPOINTMENT (OUTPATIENT)
Dept: NUCLEAR MEDICINE | Facility: IMAGING CENTER | Age: 58
End: 2023-04-26
Payer: COMMERCIAL

## 2023-04-26 ENCOUNTER — OUTPATIENT (OUTPATIENT)
Dept: OUTPATIENT SERVICES | Facility: HOSPITAL | Age: 58
LOS: 1 days | End: 2023-04-26
Payer: COMMERCIAL

## 2023-04-26 VITALS
BODY MASS INDEX: 28.49 KG/M2 | OXYGEN SATURATION: 96 % | RESPIRATION RATE: 16 BRPM | TEMPERATURE: 98.2 F | DIASTOLIC BLOOD PRESSURE: 78 MMHG | WEIGHT: 201.5 LBS | HEART RATE: 69 BPM | SYSTOLIC BLOOD PRESSURE: 121 MMHG

## 2023-04-26 DIAGNOSIS — C90.01 MULTIPLE MYELOMA IN REMISSION: ICD-10-CM

## 2023-04-26 LAB
BASOPHILS # BLD AUTO: 0 K/UL — SIGNIFICANT CHANGE UP (ref 0–0.2)
BASOPHILS NFR BLD AUTO: 0 % — SIGNIFICANT CHANGE UP (ref 0–2)
EOSINOPHIL # BLD AUTO: 0 K/UL — SIGNIFICANT CHANGE UP (ref 0–0.5)
EOSINOPHIL NFR BLD AUTO: 0 % — SIGNIFICANT CHANGE UP (ref 0–6)
HCT VFR BLD CALC: 41.1 % — SIGNIFICANT CHANGE UP (ref 39–50)
HGB BLD-MCNC: 14.3 G/DL — SIGNIFICANT CHANGE UP (ref 13–17)
IMM GRANULOCYTES NFR BLD AUTO: 0.2 % — SIGNIFICANT CHANGE UP (ref 0–0.9)
LYMPHOCYTES # BLD AUTO: 0.9 K/UL — LOW (ref 1–3.3)
LYMPHOCYTES # BLD AUTO: 18.1 % — SIGNIFICANT CHANGE UP (ref 13–44)
MCHC RBC-ENTMCNC: 34.7 PG — HIGH (ref 27–34)
MCHC RBC-ENTMCNC: 34.8 G/DL — SIGNIFICANT CHANGE UP (ref 32–36)
MCV RBC AUTO: 99.8 FL — SIGNIFICANT CHANGE UP (ref 80–100)
MONOCYTES # BLD AUTO: 0.55 K/UL — SIGNIFICANT CHANGE UP (ref 0–0.9)
MONOCYTES NFR BLD AUTO: 11.1 % — SIGNIFICANT CHANGE UP (ref 2–14)
NEUTROPHILS # BLD AUTO: 3.51 K/UL — SIGNIFICANT CHANGE UP (ref 1.8–7.4)
NEUTROPHILS NFR BLD AUTO: 70.6 % — SIGNIFICANT CHANGE UP (ref 43–77)
NRBC # BLD: 0 /100 WBCS — SIGNIFICANT CHANGE UP (ref 0–0)
PLATELET # BLD AUTO: 149 K/UL — LOW (ref 150–400)
RBC # BLD: 4.12 M/UL — LOW (ref 4.2–5.8)
RBC # FLD: 11.7 % — SIGNIFICANT CHANGE UP (ref 10.3–14.5)
WBC # BLD: 4.97 K/UL — SIGNIFICANT CHANGE UP (ref 3.8–10.5)
WBC # FLD AUTO: 4.97 K/UL — SIGNIFICANT CHANGE UP (ref 3.8–10.5)

## 2023-04-26 PROCEDURE — 78816 PET IMAGE W/CT FULL BODY: CPT | Mod: 26,PS

## 2023-04-26 PROCEDURE — 38221 DX BONE MARROW BIOPSIES: CPT | Mod: RT

## 2023-04-26 PROCEDURE — 78816 PET IMAGE W/CT FULL BODY: CPT

## 2023-04-26 PROCEDURE — A9552: CPT

## 2023-04-26 NOTE — REASON FOR VISIT
[Bone Marrow Biopsy] : bone marrow biopsy [Bone Marrow Aspiration] : bone marrow aspiration [FreeTextEntry2] : Multiple Myeloma on venetoclax and ninlaro. Surveillance BM given reduced sensitivity in peripheral blood, annual testing

## 2023-04-26 NOTE — PROCEDURE
[Bone Marrow Biopsy] : bone marrow biopsy [Bone Marrow Aspiration] : bone marrow aspiration  [Patient] : the patient [Verbal Consent Obtained] : verbal consent was obtained prior to the procedure [Patient identification verified] : patient identification verified [Procedure verified and consent obtained] : procedure verified and consent obtained [Laterality verified and correct site marked] : laterality verified and correct site marked [Right] : site: right [Correct positioning] : correct positioning [Prone] : prone [Superior iliac spine was identified] : the superior iliac spine was identified. [The right posterior iliac crest was prepped with betadine and draped, using sterile technique.] : The right posterior iliac crest was prepped with betadine and draped, using sterile technique. [Lidocaine was injected and into the periosteum overlying the site.] : Lidocaine was injected and into the periosteum overlying the site. [Aspirate] : aspirate [Cytogenetics] : cytogenetics [FISH] : FISH [Biopsy] : biopsy [Flow Cytometry] : flow cytometry [] : The patient was instructed to remove the bandage the following AM. The patient may bathe. Acetaminophen may be taken for discomfort, as per package directions.If there are any other problems, the patient was instructed to call the office. The patient verbalized understanding, and is aware of the office contact numbers. [FreeTextEntry1] : Multiple Myeloma on venetoclax and ninlaro. Surveillance BM given reduced sensitivity in peripheral blood, annual testing [FreeTextEntry2] : 10 cc of 1% xylocaine was injected into the R PIC site.\par \par WBC: 4.97\par Hgb: 14.3\par Hct: 41.1\par Plts: 149\par \par Bone marrow aspiration and biopsy were done. Multiple myeloma sent. [employer] sent, tracking #: [id number].\par On ASA, extra pressure applied post-procedure.

## 2023-05-05 ENCOUNTER — RX RENEWAL (OUTPATIENT)
Age: 58
End: 2023-05-05

## 2023-05-07 ENCOUNTER — NON-APPOINTMENT (OUTPATIENT)
Age: 58
End: 2023-05-07

## 2023-05-12 ENCOUNTER — TRANSCRIPTION ENCOUNTER (OUTPATIENT)
Age: 58
End: 2023-05-12

## 2023-05-24 ENCOUNTER — NON-APPOINTMENT (OUTPATIENT)
Age: 58
End: 2023-05-24

## 2023-05-24 ENCOUNTER — APPOINTMENT (OUTPATIENT)
Dept: INTERNAL MEDICINE | Facility: CLINIC | Age: 58
End: 2023-05-24
Payer: COMMERCIAL

## 2023-05-24 VITALS
RESPIRATION RATE: 16 BRPM | HEIGHT: 70.5 IN | TEMPERATURE: 98.3 F | HEART RATE: 56 BPM | SYSTOLIC BLOOD PRESSURE: 110 MMHG | BODY MASS INDEX: 27.75 KG/M2 | DIASTOLIC BLOOD PRESSURE: 72 MMHG | WEIGHT: 196 LBS | OXYGEN SATURATION: 95 %

## 2023-05-24 DIAGNOSIS — Z00.00 ENCOUNTER FOR GENERAL ADULT MEDICAL EXAMINATION W/OUT ABNORMAL FINDINGS: ICD-10-CM

## 2023-05-24 DIAGNOSIS — E66.3 OVERWEIGHT: ICD-10-CM

## 2023-05-24 PROCEDURE — 36415 COLL VENOUS BLD VENIPUNCTURE: CPT

## 2023-05-24 PROCEDURE — 99396 PREV VISIT EST AGE 40-64: CPT | Mod: 25

## 2023-05-24 RX ORDER — METHYLPREDNISOLONE 4 MG/1
4 TABLET ORAL
Qty: 1 | Refills: 0 | Status: DISCONTINUED | COMMUNITY
Start: 2022-12-14 | End: 2023-05-24

## 2023-05-24 RX ORDER — METHYLPREDNISOLONE 4 MG/1
4 TABLET ORAL
Qty: 1 | Refills: 0 | Status: DISCONTINUED | COMMUNITY
Start: 2022-10-21 | End: 2023-05-24

## 2023-05-24 RX ORDER — MINOCYCLINE HYDROCHLORIDE 100 MG/1
100 CAPSULE ORAL
Refills: 0 | Status: DISCONTINUED | COMMUNITY
Start: 2022-10-04 | End: 2023-05-24

## 2023-05-24 NOTE — PLAN
[FreeTextEntry1] : Routine blood work drawn in office and urine collected today. Pt advised to sign up for [facility] portal to review labs and communicate any questions or concerns directly. Yearly physical and return as needed for illness, medication refills, and new or existing complaints.

## 2023-05-24 NOTE — HISTORY OF PRESENT ILLNESS
[de-identified] : 58 year M with PMH MM in remission and HLD, presents for CPE. Pt denies CP/SOB, fever/chills, n/v/d/c.

## 2023-05-24 NOTE — HEALTH RISK ASSESSMENT
[Good] : ~his/her~  mood as  good [Yes] : Yes [2 - 4 times a month (2 pts)] : 2-4 times a month (2 points) [7 to 9 (3 pts)] : 7 to 9 (3  points) [Never (0 pts)] : Never (0 points) [No] : In the past 12 months have you used drugs other than those required for medical reasons? No [0] : 2) Feeling down, depressed, or hopeless: Not at all (0) [PHQ-2 Negative - No further assessment needed] : PHQ-2 Negative - No further assessment needed [Patient reported colonoscopy was normal] : Patient reported colonoscopy was normal [None] : None [With Family] : lives with family [Retired] : retired [] :  [# Of Children ___] : has [unfilled] children [Former] : Former [> 15 Years] : > 15 Years [Audit-CScore] : 5 [[id number]] : 0 [ColonoscopyDate] : 01/18 [ColonoscopyComments] : Due - Follows with Dr. Pradeep Littlejohn

## 2023-05-25 DIAGNOSIS — R97.20 ELEVATED PROSTATE, SPECIFIC ANTIGEN [PSA]: ICD-10-CM

## 2023-05-25 LAB
ALBUMIN SERPL ELPH-MCNC: 5 G/DL
ALP BLD-CCNC: 90 U/L
ALT SERPL-CCNC: 30 U/L
ANION GAP SERPL CALC-SCNC: 13 MMOL/L
APPEARANCE: CLEAR
AST SERPL-CCNC: 37 U/L
BACTERIA: NEGATIVE /HPF
BILIRUB SERPL-MCNC: 2.8 MG/DL
BILIRUBIN URINE: NEGATIVE
BLOOD URINE: NEGATIVE
BUN SERPL-MCNC: 12 MG/DL
CALCIUM SERPL-MCNC: 9.5 MG/DL
CAST: 0 /LPF
CHLORIDE SERPL-SCNC: 105 MMOL/L
CHOLEST SERPL-MCNC: 138 MG/DL
CO2 SERPL-SCNC: 25 MMOL/L
COLOR: YELLOW
CREAT SERPL-MCNC: 1.09 MG/DL
EGFR: 78 ML/MIN/1.73M2
EPITHELIAL CELLS: 0 /HPF
ESTIMATED AVERAGE GLUCOSE: 94 MG/DL
GLUCOSE QUALITATIVE U: NEGATIVE MG/DL
GLUCOSE SERPL-MCNC: 96 MG/DL
HBA1C MFR BLD HPLC: 4.9 %
HDLC SERPL-MCNC: 51 MG/DL
KETONES URINE: NEGATIVE MG/DL
LDLC SERPL CALC-MCNC: 64 MG/DL
LEUKOCYTE ESTERASE URINE: NEGATIVE
MICROSCOPIC-UA: NORMAL
NITRITE URINE: NEGATIVE
NONHDLC SERPL-MCNC: 87 MG/DL
PH URINE: 7
POTASSIUM SERPL-SCNC: 4.5 MMOL/L
PROT SERPL-MCNC: 6.6 G/DL
PROTEIN URINE: NEGATIVE MG/DL
PSA SERPL-MCNC: 4.4 NG/ML
RED BLOOD CELLS URINE: 1 /HPF
SODIUM SERPL-SCNC: 142 MMOL/L
SPECIFIC GRAVITY URINE: 1.02
TRIGL SERPL-MCNC: 113 MG/DL
TSH SERPL-ACNC: 1.55 UIU/ML
UROBILINOGEN URINE: 0.2 MG/DL
WHITE BLOOD CELLS URINE: 0 /HPF

## 2023-05-26 ENCOUNTER — RESULT REVIEW (OUTPATIENT)
Age: 58
End: 2023-05-26

## 2023-05-26 ENCOUNTER — APPOINTMENT (OUTPATIENT)
Dept: HEMATOLOGY ONCOLOGY | Facility: CLINIC | Age: 58
End: 2023-05-26

## 2023-05-26 ENCOUNTER — TRANSCRIPTION ENCOUNTER (OUTPATIENT)
Age: 58
End: 2023-05-26

## 2023-05-26 LAB
BASOPHILS # BLD AUTO: 0.01 K/UL — SIGNIFICANT CHANGE UP (ref 0–0.2)
BASOPHILS NFR BLD AUTO: 0.2 % — SIGNIFICANT CHANGE UP (ref 0–2)
EOSINOPHIL # BLD AUTO: 0 K/UL — SIGNIFICANT CHANGE UP (ref 0–0.5)
EOSINOPHIL NFR BLD AUTO: 0 % — SIGNIFICANT CHANGE UP (ref 0–6)
HCT VFR BLD CALC: 41.6 % — SIGNIFICANT CHANGE UP (ref 39–50)
HGB BLD-MCNC: 14.3 G/DL — SIGNIFICANT CHANGE UP (ref 13–17)
IMM GRANULOCYTES NFR BLD AUTO: 0.2 % — SIGNIFICANT CHANGE UP (ref 0–0.9)
LYMPHOCYTES # BLD AUTO: 0.8 K/UL — LOW (ref 1–3.3)
LYMPHOCYTES # BLD AUTO: 17 % — SIGNIFICANT CHANGE UP (ref 13–44)
MCHC RBC-ENTMCNC: 34.4 G/DL — SIGNIFICANT CHANGE UP (ref 32–36)
MCHC RBC-ENTMCNC: 34.9 PG — HIGH (ref 27–34)
MCV RBC AUTO: 101.5 FL — HIGH (ref 80–100)
MONOCYTES # BLD AUTO: 0.47 K/UL — SIGNIFICANT CHANGE UP (ref 0–0.9)
MONOCYTES NFR BLD AUTO: 10 % — SIGNIFICANT CHANGE UP (ref 2–14)
NEUTROPHILS # BLD AUTO: 3.42 K/UL — SIGNIFICANT CHANGE UP (ref 1.8–7.4)
NEUTROPHILS NFR BLD AUTO: 72.6 % — SIGNIFICANT CHANGE UP (ref 43–77)
NRBC # BLD: 0 /100 WBCS — SIGNIFICANT CHANGE UP (ref 0–0)
PLATELET # BLD AUTO: 138 K/UL — LOW (ref 150–400)
RBC # BLD: 4.1 M/UL — LOW (ref 4.2–5.8)
RBC # FLD: 11.2 % — SIGNIFICANT CHANGE UP (ref 10.3–14.5)
WBC # BLD: 4.71 K/UL — SIGNIFICANT CHANGE UP (ref 3.8–10.5)
WBC # FLD AUTO: 4.71 K/UL — SIGNIFICANT CHANGE UP (ref 3.8–10.5)

## 2023-05-30 ENCOUNTER — TRANSCRIPTION ENCOUNTER (OUTPATIENT)
Age: 58
End: 2023-05-30

## 2023-05-31 ENCOUNTER — TRANSCRIPTION ENCOUNTER (OUTPATIENT)
Age: 58
End: 2023-05-31

## 2023-06-01 ENCOUNTER — TRANSCRIPTION ENCOUNTER (OUTPATIENT)
Age: 58
End: 2023-06-01

## 2023-06-06 LAB
ALBUMIN MFR SERPL ELPH: 70.3 %
ALBUMIN SERPL ELPH-MCNC: 4.7 G/DL
ALBUMIN SERPL-MCNC: 4.6 G/DL
ALBUMIN/GLOB SERPL: 2.4 RATIO
ALP BLD-CCNC: 85 U/L
ALPHA1 GLOB MFR SERPL ELPH: 3.8 %
ALPHA1 GLOB SERPL ELPH-MCNC: 0.2 G/DL
ALPHA2 GLOB MFR SERPL ELPH: 9.2 %
ALPHA2 GLOB SERPL ELPH-MCNC: 0.6 G/DL
ALT SERPL-CCNC: 24 U/L
ANION GAP SERPL CALC-SCNC: 14 MMOL/L
AST SERPL-CCNC: 32 U/L
B-GLOBULIN MFR SERPL ELPH: 9.8 %
B-GLOBULIN SERPL ELPH-MCNC: 0.6 G/DL
B2 MICROGLOB SERPL-MCNC: 1.5 MG/L
BILIRUB SERPL-MCNC: 2.5 MG/DL
BUN SERPL-MCNC: 16 MG/DL
CALCIUM SERPL-MCNC: 9.7 MG/DL
CHLORIDE SERPL-SCNC: 105 MMOL/L
CO2 SERPL-SCNC: 24 MMOL/L
CREAT SERPL-MCNC: 1.06 MG/DL
DEPRECATED KAPPA LC FREE/LAMBDA SER: 1.32 RATIO
EGFR: 81 ML/MIN/1.73M2
GAMMA GLOB FLD ELPH-MCNC: 0.4 G/DL
GAMMA GLOB MFR SERPL ELPH: 6.9 %
GLUCOSE SERPL-MCNC: 93 MG/DL
IGA SER QL IEP: 47 MG/DL
IGG SER QL IEP: 530 MG/DL
IGM SER QL IEP: 15 MG/DL
INTERPRETATION SERPL IEP-IMP: NORMAL
KAPPA LC CSF-MCNC: 0.28 MG/DL
KAPPA LC SERPL-MCNC: 0.37 MG/DL
M PROTEIN SPEC IFE-MCNC: NORMAL
POTASSIUM SERPL-SCNC: 4.5 MMOL/L
PROT SERPL-MCNC: 6.5 G/DL
SODIUM SERPL-SCNC: 142 MMOL/L

## 2023-06-08 NOTE — REVIEW OF SYSTEMS
[Joint Pain] : joint pain [Negative] : Heme/Lymph [FreeTextEntry9] : Right (R) shoulder  DC instructions

## 2023-06-12 ENCOUNTER — RESULT REVIEW (OUTPATIENT)
Age: 58
End: 2023-06-12

## 2023-06-12 ENCOUNTER — APPOINTMENT (OUTPATIENT)
Dept: UROLOGY | Facility: CLINIC | Age: 58
End: 2023-06-12
Payer: COMMERCIAL

## 2023-06-12 ENCOUNTER — APPOINTMENT (OUTPATIENT)
Dept: HEMATOLOGY ONCOLOGY | Facility: CLINIC | Age: 58
End: 2023-06-12
Payer: COMMERCIAL

## 2023-06-12 VITALS
SYSTOLIC BLOOD PRESSURE: 113 MMHG | HEART RATE: 70 BPM | RESPIRATION RATE: 18 BRPM | DIASTOLIC BLOOD PRESSURE: 78 MMHG | OXYGEN SATURATION: 96 % | TEMPERATURE: 97.3 F

## 2023-06-12 VITALS
DIASTOLIC BLOOD PRESSURE: 67 MMHG | RESPIRATION RATE: 17 BRPM | WEIGHT: 196 LBS | TEMPERATURE: 97.9 F | SYSTOLIC BLOOD PRESSURE: 106 MMHG | HEART RATE: 60 BPM | BODY MASS INDEX: 27.75 KG/M2 | HEIGHT: 70.5 IN

## 2023-06-12 LAB
BASOPHILS # BLD AUTO: 0 K/UL — SIGNIFICANT CHANGE UP (ref 0–0.2)
BASOPHILS NFR BLD AUTO: 0 % — SIGNIFICANT CHANGE UP (ref 0–2)
EOSINOPHIL # BLD AUTO: 0.01 K/UL — SIGNIFICANT CHANGE UP (ref 0–0.5)
EOSINOPHIL NFR BLD AUTO: 0.2 % — SIGNIFICANT CHANGE UP (ref 0–6)
HCT VFR BLD CALC: 41 % — SIGNIFICANT CHANGE UP (ref 39–50)
HGB BLD-MCNC: 14 G/DL — SIGNIFICANT CHANGE UP (ref 13–17)
IMM GRANULOCYTES NFR BLD AUTO: 0.2 % — SIGNIFICANT CHANGE UP (ref 0–0.9)
LYMPHOCYTES # BLD AUTO: 0.98 K/UL — LOW (ref 1–3.3)
LYMPHOCYTES # BLD AUTO: 21.3 % — SIGNIFICANT CHANGE UP (ref 13–44)
MCHC RBC-ENTMCNC: 34.1 G/DL — SIGNIFICANT CHANGE UP (ref 32–36)
MCHC RBC-ENTMCNC: 34.9 PG — HIGH (ref 27–34)
MCV RBC AUTO: 102.2 FL — HIGH (ref 80–100)
MONOCYTES # BLD AUTO: 0.69 K/UL — SIGNIFICANT CHANGE UP (ref 0–0.9)
MONOCYTES NFR BLD AUTO: 15 % — HIGH (ref 2–14)
NEUTROPHILS # BLD AUTO: 2.92 K/UL — SIGNIFICANT CHANGE UP (ref 1.8–7.4)
NEUTROPHILS NFR BLD AUTO: 63.3 % — SIGNIFICANT CHANGE UP (ref 43–77)
NRBC # BLD: 0 /100 WBCS — SIGNIFICANT CHANGE UP (ref 0–0)
PLATELET # BLD AUTO: 143 K/UL — LOW (ref 150–400)
RBC # BLD: 4.01 M/UL — LOW (ref 4.2–5.8)
RBC # FLD: 11.4 % — SIGNIFICANT CHANGE UP (ref 10.3–14.5)
WBC # BLD: 4.61 K/UL — SIGNIFICANT CHANGE UP (ref 3.8–10.5)
WBC # FLD AUTO: 4.61 K/UL — SIGNIFICANT CHANGE UP (ref 3.8–10.5)

## 2023-06-12 PROCEDURE — 99214 OFFICE O/P EST MOD 30 MIN: CPT

## 2023-06-12 PROCEDURE — 99203 OFFICE O/P NEW LOW 30 MIN: CPT

## 2023-06-12 NOTE — ADDENDUM
[FreeTextEntry1] : This note was authored by Eduardo Haynes working as a scribe for Dr. Beck Wells. I, Dr. Beck Wells have reviewed the content of this note and confirm it is true and accurate. I personally performed the history and physical examination and made all the decisions. 06/12/2023

## 2023-06-12 NOTE — PHYSICAL EXAM
[General Appearance - Well Developed] : well developed [General Appearance - Well Nourished] : well nourished [Normal Appearance] : normal appearance [Well Groomed] : well groomed [General Appearance - In No Acute Distress] : no acute distress [Normal Station and Gait] : the gait and station were normal for the patient's age

## 2023-06-12 NOTE — HISTORY OF PRESENT ILLNESS
[FreeTextEntry1] : 06/12/2023: Mr. SNIGH is a 58 year old male, with multiple myeloma, currently being treated with immune suppression and was found to bein remisison, Negative recent FDG PET scan. Found to have PSa of 4.4 No family history AUA 5/35.  presenting today for an elevated PSA consult. His latest PSA screen on 05/24/2023 is 4.40 ng/mL. No LUTS or other problems. \par \par \par ASSESSMENT: Elevated PSA, \par \par PLAN:  Prostate MRI\par \par Follow up after MRI\par \par I have discussed at length the risks and benefits and rationale behind the procedure and the patient seems to understand and wants to proceed.\par \par I counseled the patient. I discussed the various etiologies of his symptoms. Risks and alternatives were discussed. I answered the patient questions. The patient will follow-up as directed and will contact me with any questions or concerns. Thank you for the opportunity to participate in the care of this patient. I'll keep you updated on his progress.

## 2023-06-14 ENCOUNTER — RX RENEWAL (OUTPATIENT)
Age: 58
End: 2023-06-14

## 2023-06-16 LAB
ALBUMIN MFR SERPL ELPH: 68.9 %
ALBUMIN SERPL ELPH-MCNC: 4.9 G/DL
ALBUMIN SERPL-MCNC: 4.4 G/DL
ALBUMIN/GLOB SERPL: 2.2 RATIO
ALP BLD-CCNC: 82 U/L
ALPHA1 GLOB MFR SERPL ELPH: 4 %
ALPHA1 GLOB SERPL ELPH-MCNC: 0.3 G/DL
ALPHA2 GLOB MFR SERPL ELPH: 9.7 %
ALPHA2 GLOB SERPL ELPH-MCNC: 0.6 G/DL
ALT SERPL-CCNC: 24 U/L
ANION GAP SERPL CALC-SCNC: 13 MMOL/L
AST SERPL-CCNC: 32 U/L
B-GLOBULIN MFR SERPL ELPH: 10.1 %
B-GLOBULIN SERPL ELPH-MCNC: 0.6 G/DL
B2 MICROGLOB SERPL-MCNC: 1.5 MG/L
BILIRUB SERPL-MCNC: 2.3 MG/DL
BUN SERPL-MCNC: 19 MG/DL
CALCIUM SERPL-MCNC: 9.4 MG/DL
CHLORIDE SERPL-SCNC: 104 MMOL/L
CO2 SERPL-SCNC: 25 MMOL/L
CREAT SERPL-MCNC: 1 MG/DL
DEPRECATED KAPPA LC FREE/LAMBDA SER: 1.63 RATIO
EGFR: 87 ML/MIN/1.73M2
GAMMA GLOB FLD ELPH-MCNC: 0.5 G/DL
GAMMA GLOB MFR SERPL ELPH: 7.3 %
GLUCOSE SERPL-MCNC: 93 MG/DL
IGA SER QL IEP: 41 MG/DL
IGG SER QL IEP: 492 MG/DL
IGM SER QL IEP: 15 MG/DL
INTERPRETATION SERPL IEP-IMP: NORMAL
KAPPA LC CSF-MCNC: 0.27 MG/DL
KAPPA LC SERPL-MCNC: 0.44 MG/DL
M PROTEIN SPEC IFE-MCNC: NORMAL
POTASSIUM SERPL-SCNC: 4.5 MMOL/L
PROT SERPL-MCNC: 6.4 G/DL
SODIUM SERPL-SCNC: 142 MMOL/L

## 2023-06-17 ENCOUNTER — APPOINTMENT (OUTPATIENT)
Dept: MRI IMAGING | Facility: IMAGING CENTER | Age: 58
End: 2023-06-17
Payer: COMMERCIAL

## 2023-06-17 ENCOUNTER — RESULT REVIEW (OUTPATIENT)
Age: 58
End: 2023-06-17

## 2023-06-17 ENCOUNTER — OUTPATIENT (OUTPATIENT)
Dept: OUTPATIENT SERVICES | Facility: HOSPITAL | Age: 58
LOS: 1 days | End: 2023-06-17
Payer: COMMERCIAL

## 2023-06-17 DIAGNOSIS — R97.20 ELEVATED PROSTATE SPECIFIC ANTIGEN [PSA]: ICD-10-CM

## 2023-06-17 PROCEDURE — 76498P: CUSTOM | Mod: 26

## 2023-06-17 PROCEDURE — A9585: CPT

## 2023-06-17 PROCEDURE — 76498 UNLISTED MR PROCEDURE: CPT

## 2023-06-17 PROCEDURE — 72197 MRI PELVIS W/O & W/DYE: CPT

## 2023-06-17 PROCEDURE — 72197 MRI PELVIS W/O & W/DYE: CPT | Mod: 26

## 2023-06-20 NOTE — CONSULT LETTER
[Dear  ___] : Dear ~ARELY, [Courtesy Letter:] : I had the pleasure of seeing your patient, [unfilled], in my office today. [Please see my note below.] : Please see my note below. [Referral Closing:] : Thank you very much for seeing this patient.  If you have any questions, please do not hesitate to contact me. [Sincerely,] : Sincerely, [FreeTextEntry3] : Pranav Hong DO, MSc\par , [facility]\par Attending Physician - Malignant Hematology\par [facility]\par [address] [address]\par Tel: [phone]\par Fax: [phone]

## 2023-06-20 NOTE — HISTORY OF PRESENT ILLNESS
[Disease:__________________________] : Disease: [unfilled] [Treatment Protocol] : Treatment Protocol [Date: ____________] : Patient's last distress assessment performed on [unfilled]. 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 Fib / A. Flutter (post-surgical, s/p cardioversion, not on AC), lumbar radiculopathy (s/p lumbar microdiscectomy 3/15/22) here for further management of his high-risk (TP53 gene deletion, del13q, t(11;14)) IgA Lambda multiple myeloma initially diagnosed in 2019. He presents here as a transfer of care related to change in insurance from [facility] system where he was most recently followed by Dr Mian Chapin.\par \par He was diagnosed with IgA lambda multiple myeloma with anemia (no bone lesions - on skeletal survey, renal issues or hypercalcemia) in May 2019. He was started on Revlimid, Velcade and Dexamethasone (RVd) in June 2019 under the care of Dr Sharif Arteaga (oncologist associated with [facility]). He reports he developed eye styes / chalazia from Velcade and subsequently discontinued the medication. He may have had a brief time on Kyprolis with Rev and Dex (he believes it was started by Dr Chapin), however he developed severe back spasms on the Revlimid and this led to its discontinuation. He also had insomnia while taking steroids. He switched his care over to [place] in Oct 2019.\par \par In January 2020 he had a repeat BM biopsy and was found to have a CR with undetectable M-spike and normalization of his FLC. During this time he was being seen by Dr Chapin at [place] [facility], who advised against autologous stem cell transplantation in first CR. He then underwent stem cell collection (stored at a cryobank at [facility]) and he was subsequently started on therapy with Ninlaro and Venetoclax maintenance (still in the front-line setting). 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 His maintenance therapy was discontinued Nov 10, 2021 and has yet to be restarted. His LVEF while on RVd in Oct 2019 was estimated at 69% ([facility]). His most recent LVEF checked at [facility] in Feb 2022 was reduced down to 39%. He has been following cardiology for optimization of his heart failure medications, for which he is taking metoprolol and losartan.\par \par Since achieving a CR in 2020, imaging with PET-CT and Bone marrow biopsies up to this point have been negative for residual disease and lesions. His last BM biopsy was in June 2021 at [facility]. He did have clonoseq testing for the ID sample, but has yet to have any MRD measurements. He was previously on Ninlaro 3 mg once weekly for 3/4 weeks every 28 day cycle and venetoclax was taken at 400 mg PO 5/7 days a week of every 28 day cycle.\par \par He reports that he had back surgery in March 2022. He was having back pain which is when they discovered he has herniated disks L4 and L5 and underwent microdiscectomy on 3/15/22. He has had a change in insurance to [employer], which is no longer covered at [facility] and he came to [facility] to discuss further management.\par \par Today he overall feels well. He has not experienced any changes in his health since his back surgery. He is currently not on any myeloma-directed therapy. He denies any new bone pain, fevers, chills, night sweats, neuropathic pain, urinary / kidney issues.\par \par ==================================================================\par Treatment Hx:\par \par VRd - unspecified number of cycles, started June 2019\par KRd? - unclear\par Ninlaro + Venetoclax (last dose 11/10/2021)\par \par ==================================================================\par Care Providers:\par \par PMD: Dr Apolinar Ballard ([facility])\par Cardiology: Dr Gurwinder Roblero ([facility])\par Oncologist / Hematologist: Dr Mian Chapin ([facility]) Tel: [phone], Fax: [phone]  \par \par ================================================================== [de-identified] : ? [de-identified] : Outside pathology at [facility]:\par BM biopsy and aspirate May 2019: \par BM core - numerous plasma cells estimated up to 70%.\par BM aspirate - 40% lambda restricted plasma cells\par \par FISH: t(11;14) CCND1-IGH gene in 45% of cells, deletion 13q14.3 region in 40% of cells and TP53 gene deletion in 28% of cells. [de-identified] : 3/30/22: Initial visit.\par \par 5/25/22: Follow-up. He has been feeling well overall. He has joined the gym 3 weeks ago and has been more active. He recently went on a cruise without any limitations in his activities. He feels he has more energy lately. His next echocardiogram is in September. No new lumps or bumps.  Weight is stable and appetite is good. He denies any constitutional complaints. He has a planned virtual appointment with Dr. Mian Chapin on 6/21/22.\par \par 8/15/22: Follow-up. He is feeling well overall. He has been swimming often at either [employer] or town pools. No issues with exercise tolerance. No chest pain, palpitations or shortness of breath.  He is seeing the cardiologist at the end of September. He takes Ninlaro on Wednesdays once a week for 3 weeks on and 1 week off. No side effects to the medications besides belching. No bleeding complications or bruising. No blood in the stool, no bleeding from gums. No new bone pains. No weight loss. \par \par 10/24/22: Follow-up. No complaints. Has gained some weight. No side effects from medications. No recent illnesses.\par \par 12/19/22: Follow-up. He continues to have chronic back pain related to degenerative changes.Otherwise no significant changes in health. No medication changes. Will start pro [employer] work again as an  this tax season. He continues to tolerate treatment well without any significant adverse effects or cytopenias.\par \par 3/15/23: Follow-up. He reports continued chronic back pain. No new bone pains. He also has fatigue. He has not experienced any constitutional issues. He has gained weight. No recent illnesses. His blood counts remain stable on therapy.\par \par 6/12/23: Follow-up. Has been experiencing generalized fatigue. No other issues. He has not noticed any side effects from therapy. Blood counts are stable / normal today. \par \par A comprehensive review of systems was performed including constitutional, eyes, ENT, cardiovascular, respiratory, gastrointestinal, genitourinary, musculoskeletal, integumentary, neurological, psychiatric and hematologic / lymphatic. All pertinent positives are included in the H&P under interval history above and the remaining review of systems listed are negative. \par \par ==========================================================\par Treatment Hx: \par \par Maintenance with Ninlaro (weekly day 1,8,15) + Venetoclax (M-F) on 28-day cycles\par - Started back March 2022\par \par ========================================================== [FreeTextEntry1] : Ninlaro 3 weeks on and 1 week off + Venetoclax 400 mg M-F (Sat and Sun off) continuously

## 2023-06-20 NOTE — RESULTS/DATA
[FreeTextEntry1] : =========================================================================\par  EXAM: [id number] - PETCT WB ONC FDG SUBS  - ORDERED BY: ASIF HARTMANN\par \par PROCEDURE DATE:  04/30/2022  \par  \par INTERPRETATION:  PROCEDURE:  PET/CT WHOLE BODY ([id number])\par \par CLINICAL INFORMATION: 57-year-old male diagnosed with multiple myeloma in \par 2019, status post therapy. PET/CT is done as part of subsequent treatment \par strategy evaluation.\par \par RADIOPHARMACEUTICAL:  11.64 mCi F-18, FDG, I.V.\par \par TECHNIQUE:  Fasting blood sugar measured prior to injection of \par radiopharmaceutical was 78 mg/dl. Following intravenous injection of the \par radiopharmaceutical and an uptake period of approximately 60 minutes, \par FDG-PET/CT imaging was performed on a Siemens Biograph mCT 64 PET-CT from \par vertex of the skull to the toes. Oral contrast was given during the \par uptake period. CT was performed during shallow respiration. The CT \par protocol was optimized for PET attenuation correction and anatomic \par localization. The CT protocol was not designed to produce and cannot \par replace state-of-the-art diagnostic CT images with specific imaging \par protocols for different body parts and indications. Images were reviewed \par on a dedicated workstation using multiplanar reconstruction.\par \par The standardized uptake values (SUV) are normalized to patient body \par weight and indicate the highest activity concentration (SUVmax) in a \par given disease site. All image numbers refer to axial image number.\par \par COMPARISON:  No prior PET/CT is available for prior PET/CT..\par \par OTHER STUDIES USED FOR CORRELATION: MRI of lumbar spine from 2/7/2022 and \par CT angiogram chest from 2/6/2022.\par \par FINDINGS:\par \par HEAD/NECK: Physiologic FDG activity in the brain, major salivary glands, \par and neck muscles.\par CHEST: No abnormal FDG activity. No lymphadenopathy.\par LUNGS: No abnormal FDG activity. No nodule.\par PLEURA/PERICARDIUM:Physiologic FDG activity. No pleural or pericardial \par effusion.\par \par HEPATOBILIARY/PANCREAS: Physiologic FDG activity. Liver SUV mean is 2.7.\par SPLEEN: Physiologic FDG activity. Normal in size.\par ADRENAL GLANDS: No abnormal FDG activity. No pleural or pericardial \par effusion.\par KIDNEYS/URINARY BLADDER: Physiologic excreted FDG activity.\par PELVIC ORGANS: No abnormal FDG activity.\par ABDOMINOPELVIC NODES: No enlarged or FDG avid lymph nodes.\par BOWEL/PERITONEUM/MESENTERY: No abnormal FDG activity.\par BONES/SOFT TISSUES:Nonspecific heterogeneous FDG activity in the spinous \par processes of L3 to L5 with no corresponding abnormalities on CT, and \par associated overlying soft tissue stranding (SUV 9.7; image 251).\par \par IMPRESSION:  FDG-PET/CT scan demonstrates:\par \par Nonspecific heterogeneous FDG activity in the spinous processes of L3-L5 without CT correlate, with associated mildly FDG avid overlying soft tissue stranding. This may be secondary to post traumatic changes. Please correlate clinically.\par \par Direct comparison will be performed and an addendum issued when prior PET/CT is made available.\par \par --- End of Report ---\par \par BEN LOYA MD; Attending Nuclear Medicine \par This document has been electronically signed. May  1 2022  2:57PM\par \par =========================================================================

## 2023-06-20 NOTE — ASSESSMENT
[FreeTextEntry1] : 58 yo male with high risk IgA lambda Multiple Myeloma (Dx 2019) currently in a morphologic and serologic CR at last assessment here for further monitoring and management. He has been on 3 different treatment / maintenance regimens (including VRd, KRd, Venetoclax + Ninlaro) all in the front-line setting. 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 His last TTE in Feb 2022 showed heart failure with reduced ejection fraction at 39%. He had been off all maintenance therapy since 11/10/2021 but restarted again in June 2022.\par \par During our first visit on 3/30/22 we ordered immunoelectrophoresis studies which did not detect a monoclonal protein or elevated FLC in either the serum or urine. A repeat PET-CT was performed on 4/30/22 which did not show any areas of disease. In addition we performed a BM biopsy and aspirate on 4/8/22 in which no morphologic, cytogenetic, or immunophenotypic features of plasma cell myeloma were identified thus showing continued CR. Clonoseq MRD testing was sent on the first 1.5 - 2 mL aspirate pull which showed the presence of a residual sequence below the limit of detection (">0-1 residual clonal cells per million nucleated cell"). Only 1 of the original 3 sequences was detected (IGH Sequence A). Sequences for IGK and IGL patient-specific mutations were not detected.\par \par He continues to be co-managed by Dr Chapin at [place]. In June 2022, he was started back on maintenance Ninlaro and Venetoclax with the total goal of 5 years of therapy. Repeat clonoseq testing in April 26, 2023 again showed MRD to the level of >0-<1 / 1,000,000 cells. In addition, he will continue to undergo annual PET-CT and clonoseq MRD testing with the next testing planned in April 2024. Dr Chapin will continue to be involved in Mr Marie care and plans to see him every 6 months via telemedicine.\par \par Plan:\par - Repeat myeloma panel (Pre-V) once every 12 weeks. \par - CBCs at every 4 weeks due to maintenance therapy\par - Continue Venetoclax and Ninlaro therapy as above, discussed results of most recent testing.\par - Asymptomatic increase in T-bili on CMP related to Gilbert syndrome, no action required\par - Prefer serial BM monitoring (peripheral blood has reduced sensitivity) with clonoseq MRD testing every 12 months, next due in April 2023\par - Healthcare Maintenance: COVID-19 vaccinated, continue with booster doses per current CDC guidelines, Pneumonia vaccinated with Prevnar 20. Completed shingles vaccination. Annual derm visit recommended.\par - Send note, BM reports and PET-CT reports to Dr Chapin's Office fax [phone]  \par - Follow up in 12 weeks\par \par ___\par I personally have spent a total of 30 minutes of time on the date of this encounter reviewing test results, documenting findings, providing education, coordinating care and directly consulting with the patient.

## 2023-06-26 ENCOUNTER — NON-APPOINTMENT (OUTPATIENT)
Age: 58
End: 2023-06-26

## 2023-06-28 ENCOUNTER — APPOINTMENT (OUTPATIENT)
Dept: UROLOGY | Facility: CLINIC | Age: 58
End: 2023-06-28
Payer: COMMERCIAL

## 2023-06-28 ENCOUNTER — OUTPATIENT (OUTPATIENT)
Dept: OUTPATIENT SERVICES | Facility: HOSPITAL | Age: 58
LOS: 1 days | End: 2023-06-28
Payer: COMMERCIAL

## 2023-06-28 VITALS — SYSTOLIC BLOOD PRESSURE: 114 MMHG | DIASTOLIC BLOOD PRESSURE: 78 MMHG | OXYGEN SATURATION: 98 % | HEART RATE: 57 BPM

## 2023-06-28 VITALS — SYSTOLIC BLOOD PRESSURE: 118 MMHG | HEART RATE: 92 BPM | TEMPERATURE: 62 F | DIASTOLIC BLOOD PRESSURE: 82 MMHG

## 2023-06-28 DIAGNOSIS — R97.20 ELEVATED PROSTATE SPECIFIC ANTIGEN [PSA]: ICD-10-CM

## 2023-06-28 DIAGNOSIS — R35.0 FREQUENCY OF MICTURITION: ICD-10-CM

## 2023-06-28 PROCEDURE — 55700: CPT | Mod: 22

## 2023-06-28 PROCEDURE — 76942 ECHO GUIDE FOR BIOPSY: CPT | Mod: 26,59

## 2023-06-28 PROCEDURE — 76942 ECHO GUIDE FOR BIOPSY: CPT | Mod: 59

## 2023-06-28 PROCEDURE — 55700: CPT

## 2023-06-28 PROCEDURE — 76377 3D RENDER W/INTRP POSTPROCES: CPT | Mod: 26

## 2023-06-28 PROCEDURE — C8001: CPT

## 2023-06-29 ENCOUNTER — OUTPATIENT (OUTPATIENT)
Dept: OUTPATIENT SERVICES | Facility: HOSPITAL | Age: 58
LOS: 1 days | Discharge: HOME | End: 2023-06-29

## 2023-06-29 ENCOUNTER — APPOINTMENT (OUTPATIENT)
Dept: UROLOGY | Facility: CLINIC | Age: 58
End: 2023-06-29
Payer: COMMERCIAL

## 2023-06-29 DIAGNOSIS — R31.0 GROSS HEMATURIA: ICD-10-CM

## 2023-06-29 DIAGNOSIS — R97.20 ELEVATED PROSTATE SPECIFIC ANTIGEN [PSA]: ICD-10-CM

## 2023-06-29 DIAGNOSIS — C90.00 MULTIPLE MYELOMA NOT HAVING ACHIEVED REMISSION: ICD-10-CM

## 2023-06-29 PROCEDURE — 99214 OFFICE O/P EST MOD 30 MIN: CPT

## 2023-06-29 RX ORDER — HYDROCODONE BITARTRATE AND ACETAMINOPHEN 5; 325 MG/1; MG/1
5-325 TABLET ORAL
Qty: 1 | Refills: 0 | Status: DISCONTINUED | COMMUNITY
Start: 2023-06-26 | End: 2023-06-29

## 2023-06-29 RX ORDER — ENEMA 19; 7 G/133ML; G/133ML
7-19 ENEMA RECTAL
Qty: 1 | Refills: 0 | Status: DISCONTINUED | COMMUNITY
Start: 2023-06-26 | End: 2023-06-29

## 2023-06-29 RX ORDER — DIAZEPAM 5 MG/1
5 TABLET ORAL
Qty: 1 | Refills: 0 | Status: DISCONTINUED | COMMUNITY
Start: 2023-06-26 | End: 2023-06-29

## 2023-06-30 ENCOUNTER — APPOINTMENT (OUTPATIENT)
Dept: UROLOGY | Facility: CLINIC | Age: 58
End: 2023-06-30

## 2023-07-10 ENCOUNTER — TRANSCRIPTION ENCOUNTER (OUTPATIENT)
Age: 58
End: 2023-07-10

## 2023-07-10 LAB — PROSTATE BIOPSY: NORMAL

## 2023-07-11 ENCOUNTER — RESULT REVIEW (OUTPATIENT)
Age: 58
End: 2023-07-11

## 2023-07-11 ENCOUNTER — APPOINTMENT (OUTPATIENT)
Dept: HEMATOLOGY ONCOLOGY | Facility: CLINIC | Age: 58
End: 2023-07-11

## 2023-07-11 LAB
BASOPHILS # BLD AUTO: 0 K/UL — SIGNIFICANT CHANGE UP (ref 0–0.2)
BASOPHILS NFR BLD AUTO: 0 % — SIGNIFICANT CHANGE UP (ref 0–2)
EOSINOPHIL # BLD AUTO: 0.01 K/UL — SIGNIFICANT CHANGE UP (ref 0–0.5)
EOSINOPHIL NFR BLD AUTO: 0.2 % — SIGNIFICANT CHANGE UP (ref 0–6)
HCT VFR BLD CALC: 38.9 % — LOW (ref 39–50)
HGB BLD-MCNC: 13.1 G/DL — SIGNIFICANT CHANGE UP (ref 13–17)
IMM GRANULOCYTES NFR BLD AUTO: 0.4 % — SIGNIFICANT CHANGE UP (ref 0–0.9)
LYMPHOCYTES # BLD AUTO: 0.91 K/UL — LOW (ref 1–3.3)
LYMPHOCYTES # BLD AUTO: 16.7 % — SIGNIFICANT CHANGE UP (ref 13–44)
MCHC RBC-ENTMCNC: 33.7 G/DL — SIGNIFICANT CHANGE UP (ref 32–36)
MCHC RBC-ENTMCNC: 34.2 PG — HIGH (ref 27–34)
MCV RBC AUTO: 101.6 FL — HIGH (ref 80–100)
MONOCYTES # BLD AUTO: 0.55 K/UL — SIGNIFICANT CHANGE UP (ref 0–0.9)
MONOCYTES NFR BLD AUTO: 10.1 % — SIGNIFICANT CHANGE UP (ref 2–14)
NEUTROPHILS # BLD AUTO: 3.95 K/UL — SIGNIFICANT CHANGE UP (ref 1.8–7.4)
NEUTROPHILS NFR BLD AUTO: 72.6 % — SIGNIFICANT CHANGE UP (ref 43–77)
NRBC # BLD: 0 /100 WBCS — SIGNIFICANT CHANGE UP (ref 0–0)
PLATELET # BLD AUTO: 164 K/UL — SIGNIFICANT CHANGE UP (ref 150–400)
RBC # BLD: 3.83 M/UL — LOW (ref 4.2–5.8)
RBC # FLD: 11.9 % — SIGNIFICANT CHANGE UP (ref 10.3–14.5)
WBC # BLD: 5.44 K/UL — SIGNIFICANT CHANGE UP (ref 3.8–10.5)
WBC # FLD AUTO: 5.44 K/UL — SIGNIFICANT CHANGE UP (ref 3.8–10.5)

## 2023-07-12 LAB
ALBUMIN MFR SERPL ELPH: 65.2 %
ALBUMIN SERPL ELPH-MCNC: 4.7 G/DL
ALBUMIN SERPL-MCNC: 4.2 G/DL
ALBUMIN/GLOB SERPL: 1.9 RATIO
ALP BLD-CCNC: 94 U/L
ALPHA1 GLOB MFR SERPL ELPH: 4.9 %
ALPHA1 GLOB SERPL ELPH-MCNC: 0.3 G/DL
ALPHA2 GLOB MFR SERPL ELPH: 11.5 %
ALPHA2 GLOB SERPL ELPH-MCNC: 0.7 G/DL
ALT SERPL-CCNC: 30 U/L
ANION GAP SERPL CALC-SCNC: 12 MMOL/L
AST SERPL-CCNC: 42 U/L
B-GLOBULIN MFR SERPL ELPH: 11 %
B-GLOBULIN SERPL ELPH-MCNC: 0.7 G/DL
B2 MICROGLOB SERPL-MCNC: 1.5 MG/L
BILIRUB SERPL-MCNC: 1.7 MG/DL
BUN SERPL-MCNC: 13 MG/DL
CALCIUM SERPL-MCNC: 9.5 MG/DL
CHLORIDE SERPL-SCNC: 103 MMOL/L
CO2 SERPL-SCNC: 26 MMOL/L
CREAT SERPL-MCNC: 0.97 MG/DL
DEPRECATED KAPPA LC FREE/LAMBDA SER: 1.74 RATIO
EGFR: 90 ML/MIN/1.73M2
GAMMA GLOB FLD ELPH-MCNC: 0.5 G/DL
GAMMA GLOB MFR SERPL ELPH: 7.4 %
GLUCOSE SERPL-MCNC: 92 MG/DL
IGA SER QL IEP: 44 MG/DL
IGG SER QL IEP: 507 MG/DL
IGM SER QL IEP: 23 MG/DL
INTERPRETATION SERPL IEP-IMP: NORMAL
KAPPA LC CSF-MCNC: 0.27 MG/DL
KAPPA LC SERPL-MCNC: 0.47 MG/DL
M PROTEIN SPEC IFE-MCNC: NORMAL
POTASSIUM SERPL-SCNC: 4.5 MMOL/L
PROT SERPL-MCNC: 6.2 G/DL
PROT SERPL-MCNC: 6.4 G/DL
PROT SERPL-MCNC: 6.4 G/DL
SODIUM SERPL-SCNC: 141 MMOL/L

## 2023-07-26 ENCOUNTER — APPOINTMENT (OUTPATIENT)
Dept: UROLOGY | Facility: CLINIC | Age: 58
End: 2023-07-26
Payer: COMMERCIAL

## 2023-07-26 VITALS
SYSTOLIC BLOOD PRESSURE: 115 MMHG | TEMPERATURE: 96.9 F | HEART RATE: 65 BPM | DIASTOLIC BLOOD PRESSURE: 76 MMHG | OXYGEN SATURATION: 96 % | BODY MASS INDEX: 27.58 KG/M2 | WEIGHT: 195 LBS

## 2023-07-26 PROCEDURE — 99215 OFFICE O/P EST HI 40 MIN: CPT

## 2023-07-27 NOTE — ADDENDUM
[FreeTextEntry1] : This note was authored by Eduardo Haynes working as a scribe for Dr. Beck Wells. I, Dr. Beck Wells have reviewed the content of this note and confirm it is true and accurate. I personally performed the history and physical examination and made all the decisions. 07/26/2023

## 2023-07-27 NOTE — HISTORY OF PRESENT ILLNESS
[FreeTextEntry1] : 07/26/2023: Mr. SINGH is a 58 year old male presenting today for consideration of curative therapy for prostate cancer. He is accompanied today by his wife who works as a Nurse at [employer]. He reports that he did not tolerate the prostate biopsy well. he experienced discomfort afterwards and clot formation for which he was subsequently hospitalized and catheterized. He expresses preference for Active surveillance. He denies difficulties with urination. His AUA score today is 3/35.  \par \par -Clinical Stage T2a\par -PSA 4.4 ng/mL (05/24/2203)\par -Prostate MRI (06/17/2023) shows "Prostate lesion as detailed above.PIRADS 4 - High (clinically significant cancer is likely to be present). No extraprostatic extension, seminal vesicle invasion, or pelvic lymphadenopathy.Prostate Volume: 67 mL. PSA density: 0.07 ng/mL/mL". \par -Prostate Biopsy (06/27/2023) shows [place] 6 (3+3); and 2/13 positive cores.\par 1. Prostate, MRI lesion #1, needle biopsy\par      -Adenocarcinoma of the prostate, Prognostic Grade Group 1 (Kay\par  score 3+3=6) involving 10% (1 mm in length) of 1 of 5 cores.\par 2.  Prostate, right posterior lateral base, needle biopsy\par      -Adenocarcinoma of the prostate, Prognostic Grade Group 1 ([place]\par  score 3+3=6) involving 10% (1 mm in length) of 1 core.\par \par ASSESSMENT:Kay's Grade Group 1 \par \par Today we discussed the natural history of localized prostate cancer, and the heterogeneous biology of this malignancy.  We discussed the fact that many prostate cancers are slow growing and unlikely to metastasize or cause death, whereas others can be life threatening.  We reviewed risk stratification, staging, [place] scoring, and PSA levels as they pertain to risk.  \par \par All treatment options were reviewed. This included active surveillance, androgen deprivation, emerging options such as focal therapy/HIFU/cryotherapy, radiation options (including IMRT, SBRT, brachytherapy) and surgical options ( open vs. robotic, nerve vs non-nerve sparing.) Oncologic outcomes were compared and contrasted. Risks of biochemical and clinical recurrence discussed. Risks of needing adjuvant or salvage treatment reviewed. We discussed the the risks of secondary malignancy after radiation. We discussed the opportunity for pathological staging with survey vs other options. We discussed the possibility of positive margins, treatment failure, cancer recurrence and cancer-related death even with treatment. \par \par All potential side effects of treatment were reviewed including, but not limited to: short term or permanent stress urinary incontinence and/or short term or permanent erectile dysfunction, penile shortening, rectal symptoms/pain, perineal pain, and other side effects. \par \par All potential complications of treatment and surgery were reviewed including, but not limited to: risks of conversion from MIS to open surgery discussed, bleeding/life-threatening hemorrhage, rectal injury requiring colostomy or delayed recognition leading to fistula, vascular/bowel/adjacent visceral organ injury, trocar/access injury, the possibility of recognized vs. unrecognized/delayed- recognition injury, risks of thermal/blunt/sharp/retraction injury, risks of DVT, PE, MI, death risks of cardiopulmonary/anesthesia related complications, positional injury, infection/collection/abscess, wound complications/dehiscence/seroma/cellulitis, urinoma/fistula, uretal injury/obstruction, bladder neck contracture, penile shortening, meatal stenosis, urethral stricture, lymphocele, obstructor nerve injury, prolonged anastomotic leak, and other complications.\par \par PLAN: Pt will undergo active surveillance for prostate cancer. He will RTO in 6 months with PSA before visit. \par and repeat prostate biopsy in one year. \par \par I counseled the patient. I discussed the various etiologies of his symptoms. Risks and alternatives were discussed. I answered the patient questions. The patient will follow-up as directed and will contact me with any questions or concerns. Thank you for the opportunity to participate in the care of this patient. I'll keep you updated on his progress.

## 2023-07-27 NOTE — DISEASE MANAGEMENT
[2] : T2 [a] : a [X] : MX [0-10] : 0 -10 ng/mL [Biopsy with Fusion] : Patient had a biopsy with fusion on [6] : Fusion Biopsy Kay Score: 6 [Biopsy results sent to PCP/Referring Physician] : Biopsy results sent to PCP/Referring Physician [] : Patient had a Prostate MRI [4] : 4 [I] : I [BiopsyDate] : 6/27/23 [MeasuredProstateVolume] : 67 [TotalCores] : 13 [TotalPositiveCores] : 2 [MaxCoreInvolvement] : 10

## 2023-08-07 ENCOUNTER — RESULT REVIEW (OUTPATIENT)
Age: 58
End: 2023-08-07

## 2023-08-07 ENCOUNTER — APPOINTMENT (OUTPATIENT)
Dept: HEMATOLOGY ONCOLOGY | Facility: CLINIC | Age: 58
End: 2023-08-07

## 2023-08-07 LAB
BASOPHILS # BLD AUTO: 0 K/UL — SIGNIFICANT CHANGE UP (ref 0–0.2)
BASOPHILS NFR BLD AUTO: 0 % — SIGNIFICANT CHANGE UP (ref 0–2)
EOSINOPHIL # BLD AUTO: 0 K/UL — SIGNIFICANT CHANGE UP (ref 0–0.5)
EOSINOPHIL NFR BLD AUTO: 0 % — SIGNIFICANT CHANGE UP (ref 0–6)
HCT VFR BLD CALC: 40.8 % — SIGNIFICANT CHANGE UP (ref 39–50)
HGB BLD-MCNC: 13.8 G/DL — SIGNIFICANT CHANGE UP (ref 13–17)
IMM GRANULOCYTES NFR BLD AUTO: 0.3 % — SIGNIFICANT CHANGE UP (ref 0–0.9)
LYMPHOCYTES # BLD AUTO: 0.77 K/UL — LOW (ref 1–3.3)
LYMPHOCYTES # BLD AUTO: 9.7 % — LOW (ref 13–44)
MCHC RBC-ENTMCNC: 33.8 G/DL — SIGNIFICANT CHANGE UP (ref 32–36)
MCHC RBC-ENTMCNC: 34 PG — SIGNIFICANT CHANGE UP (ref 27–34)
MCV RBC AUTO: 100.5 FL — HIGH (ref 80–100)
MONOCYTES # BLD AUTO: 0.85 K/UL — SIGNIFICANT CHANGE UP (ref 0–0.9)
MONOCYTES NFR BLD AUTO: 10.7 % — SIGNIFICANT CHANGE UP (ref 2–14)
NEUTROPHILS # BLD AUTO: 6.3 K/UL — SIGNIFICANT CHANGE UP (ref 1.8–7.4)
NEUTROPHILS NFR BLD AUTO: 79.3 % — HIGH (ref 43–77)
NRBC # BLD: 0 /100 WBCS — SIGNIFICANT CHANGE UP (ref 0–0)
PLATELET # BLD AUTO: 146 K/UL — LOW (ref 150–400)
RBC # BLD: 4.06 M/UL — LOW (ref 4.2–5.8)
RBC # FLD: 11.5 % — SIGNIFICANT CHANGE UP (ref 10.3–14.5)
WBC # BLD: 7.94 K/UL — SIGNIFICANT CHANGE UP (ref 3.8–10.5)
WBC # FLD AUTO: 7.94 K/UL — SIGNIFICANT CHANGE UP (ref 3.8–10.5)

## 2023-08-11 ENCOUNTER — APPOINTMENT (OUTPATIENT)
Dept: CARDIOLOGY | Facility: CLINIC | Age: 58
End: 2023-08-11
Payer: COMMERCIAL

## 2023-08-11 PROCEDURE — 93306 TTE W/DOPPLER COMPLETE: CPT

## 2023-08-12 ENCOUNTER — MESSAGE (OUTPATIENT)
Age: 58
End: 2023-08-12

## 2023-08-14 ENCOUNTER — NON-APPOINTMENT (OUTPATIENT)
Age: 58
End: 2023-08-14

## 2023-08-14 ENCOUNTER — APPOINTMENT (OUTPATIENT)
Dept: CARDIOLOGY | Facility: CLINIC | Age: 58
End: 2023-08-14
Payer: COMMERCIAL

## 2023-08-14 VITALS
WEIGHT: 195 LBS | BODY MASS INDEX: 27.61 KG/M2 | OXYGEN SATURATION: 95 % | SYSTOLIC BLOOD PRESSURE: 112 MMHG | TEMPERATURE: 98 F | DIASTOLIC BLOOD PRESSURE: 77 MMHG | HEIGHT: 70.5 IN | HEART RATE: 59 BPM

## 2023-08-14 DIAGNOSIS — I50.22 CHRONIC SYSTOLIC (CONGESTIVE) HEART FAILURE: ICD-10-CM

## 2023-08-14 PROCEDURE — 93000 ELECTROCARDIOGRAM COMPLETE: CPT

## 2023-08-14 PROCEDURE — 99214 OFFICE O/P EST MOD 30 MIN: CPT

## 2023-08-15 PROBLEM — I50.22 CHRONIC SYSTOLIC HEART FAILURE: Status: ACTIVE | Noted: 2022-03-22

## 2023-08-15 NOTE — DISCUSSION/SUMMARY
[FreeTextEntry1] : HFimpEF EF 60%, NYHA Class I hx of MV/TV repair MM, in remission, on maintenance medications  -cont asa 81mg daily for presence of prosthetic material -IE ppx is indicated and was reviewed -cont metoprolol 100mg daily -intolerant to losartan. continue valsartan 20mg daily as part of GDMT -given ongoing immunotherapy (different from initial regimen) he will require routine surveillance TTE's likely annually, though sooner should sx develop [EKG obtained to assist in diagnosis and management of assessed problem(s)] : EKG obtained to assist in diagnosis and management of assessed problem(s)

## 2023-08-15 NOTE — HISTORY OF PRESENT ILLNESS
[FreeTextEntry1] : 58M hx of myeloma, MVP with ruptured chordae s/p MV/TV repair 11/30/2021, post-op Afib, HFimpEF EF 40%-->60% who presents for follow-up  MM 5/2019 in remission since 1/2021- previously on immunotherapy with ?cardiac complications. Currently on ninlaro and venetoclax  Feels well, swims regularly. Notes his resting HR is in the 60s No active cardiac sx tolerating his current GDMT  12/16/21: Chol 118/TG 87/HDL 37/LDL 81

## 2023-08-15 NOTE — CARDIOLOGY SUMMARY
[de-identified] : 8/14/23: SR, nonspecific t wave changes 9/16/22: NSR 3/22/2022: SR, nonspecific twave flattening [de-identified] : 3/4/20: SR, PVC 2.96% [de-identified] : 3/18/20: No ischemia. 12 minutes [de-identified] : 8/11/23: 1. Normal left ventricular cavity size. Left ventricular wall thickness is normal. Left ventricular systolic function is normal with an ejection fraction of 60 % by Guallpa's method of disks. There are no regional wall motion abnormalities seen. 2. There is normal left ventricular diastolic function, with normal filling pressure. 3. Normal right ventricular cavity size, normal right ventricular wall thickness and normal right ventricular systolic function. 4. No significant valvular disease. 5. Normal atria. 6. No pericardial effusion seen. 7. Mild pulmonic regurgitation. 3/11/2021:  EF 40%, s/p MVR,TVR  9/14/22: EF 60%

## 2023-08-20 LAB
ALBUMIN MFR SERPL ELPH: 68.7 %
ALBUMIN SERPL ELPH-MCNC: 4.5 G/DL
ALBUMIN SERPL-MCNC: 4.1 G/DL
ALBUMIN/GLOB SERPL: 2.2 RATIO
ALP BLD-CCNC: 88 U/L
ALPHA1 GLOB MFR SERPL ELPH: 4.1 %
ALPHA1 GLOB SERPL ELPH-MCNC: 0.2 G/DL
ALPHA2 GLOB MFR SERPL ELPH: 9.8 %
ALPHA2 GLOB SERPL ELPH-MCNC: 0.6 G/DL
ALT SERPL-CCNC: 22 U/L
ANION GAP SERPL CALC-SCNC: 15 MMOL/L
AST SERPL-CCNC: 32 U/L
B-GLOBULIN MFR SERPL ELPH: 10.4 %
B-GLOBULIN SERPL ELPH-MCNC: 0.6 G/DL
B2 MICROGLOB SERPL-MCNC: 1.5 MG/L
BILIRUB SERPL-MCNC: 2.2 MG/DL
BUN SERPL-MCNC: 16 MG/DL
CALCIUM SERPL-MCNC: 9.1 MG/DL
CHLORIDE SERPL-SCNC: 103 MMOL/L
CO2 SERPL-SCNC: 23 MMOL/L
CREAT SERPL-MCNC: 0.93 MG/DL
DEPRECATED KAPPA LC FREE/LAMBDA SER: 2.28 RATIO
EGFR: 95 ML/MIN/1.73M2
GAMMA GLOB FLD ELPH-MCNC: 0.4 G/DL
GAMMA GLOB MFR SERPL ELPH: 7 %
GLUCOSE SERPL-MCNC: 99 MG/DL
IGA SER QL IEP: 45 MG/DL
IGG SER QL IEP: 507 MG/DL
IGM SER QL IEP: 17 MG/DL
INTERPRETATION SERPL IEP-IMP: NORMAL
KAPPA LC CSF-MCNC: 0.25 MG/DL
KAPPA LC SERPL-MCNC: 0.57 MG/DL
M PROTEIN SPEC IFE-MCNC: NORMAL
POTASSIUM SERPL-SCNC: 4.5 MMOL/L
PROT SERPL-MCNC: 6 G/DL
SODIUM SERPL-SCNC: 141 MMOL/L

## 2023-08-24 NOTE — DISCHARGE NOTE NURSING/CASE MANAGEMENT/SOCIAL WORK - HAVE YOU HAD A FIRST COVID-19 BOOSTER?
[Normal Posterior Cervical Nodes] : no posterior cervical lymphadenopathy [Alert] : alert [Well Nourished] : well nourished [No Acute Distress] : no acute distress [Well Developed] : well developed [Normal Sclera/Conjunctiva] : normal sclera/conjunctiva [EOMI] : extra ocular movement intact [No Proptosis] : no proptosis [Normal Oropharynx] : the oropharynx was normal [Thyroid Not Enlarged] : the thyroid was not enlarged [No Thyroid Nodules] : no palpable thyroid nodules [No Respiratory Distress] : no respiratory distress [No Accessory Muscle Use] : no accessory muscle use [Clear to Auscultation] : lungs were clear to auscultation bilaterally [Normal S1, S2] : normal S1 and S2 [Normal Rate] : heart rate was normal [Regular Rhythm] : with a regular rhythm [No Edema] : no peripheral edema [Pedal Pulses Normal] : the pedal pulses are present Yes [Normal Bowel Sounds] : normal bowel sounds [Not Tender] : non-tender [Not Distended] : not distended [Soft] : abdomen soft [Normal Anterior Cervical Nodes] : no anterior cervical lymphadenopathy [No Spinal Tenderness] : no spinal tenderness [Spine Straight] : spine straight [No Stigmata of Cushings Syndrome] : no stigmata of Cushings Syndrome [Normal Gait] : normal gait [Normal Strength/Tone] : muscle strength and tone were normal [No Rash] : no rash [Acanthosis Nigricans] : no acanthosis nigricans [Normal Reflexes] : deep tendon reflexes were 2+ and symmetric [No Tremors] : no tremors [Oriented x3] : oriented to person, place, and time

## 2023-08-25 ENCOUNTER — TRANSCRIPTION ENCOUNTER (OUTPATIENT)
Age: 58
End: 2023-08-25

## 2023-08-28 ENCOUNTER — TRANSCRIPTION ENCOUNTER (OUTPATIENT)
Age: 58
End: 2023-08-28

## 2023-08-30 ENCOUNTER — OUTPATIENT (OUTPATIENT)
Dept: OUTPATIENT SERVICES | Facility: HOSPITAL | Age: 58
LOS: 1 days | Discharge: HOME | End: 2023-08-30

## 2023-08-30 DIAGNOSIS — C90.00 MULTIPLE MYELOMA NOT HAVING ACHIEVED REMISSION: ICD-10-CM

## 2023-09-11 ENCOUNTER — RESULT REVIEW (OUTPATIENT)
Age: 58
End: 2023-09-11

## 2023-09-11 ENCOUNTER — APPOINTMENT (OUTPATIENT)
Dept: HEMATOLOGY ONCOLOGY | Facility: CLINIC | Age: 58
End: 2023-09-11
Payer: COMMERCIAL

## 2023-09-11 VITALS
BODY MASS INDEX: 27.76 KG/M2 | DIASTOLIC BLOOD PRESSURE: 79 MMHG | SYSTOLIC BLOOD PRESSURE: 118 MMHG | TEMPERATURE: 97.7 F | OXYGEN SATURATION: 97 % | RESPIRATION RATE: 16 BRPM | WEIGHT: 196.21 LBS | HEART RATE: 56 BPM

## 2023-09-11 LAB
BASOPHILS # BLD AUTO: 0.01 K/UL — SIGNIFICANT CHANGE UP (ref 0–0.2)
BASOPHILS NFR BLD AUTO: 0.2 % — SIGNIFICANT CHANGE UP (ref 0–2)
EOSINOPHIL # BLD AUTO: 0.02 K/UL — SIGNIFICANT CHANGE UP (ref 0–0.5)
EOSINOPHIL NFR BLD AUTO: 0.4 % — SIGNIFICANT CHANGE UP (ref 0–6)
HCT VFR BLD CALC: 41.4 % — SIGNIFICANT CHANGE UP (ref 39–50)
HGB BLD-MCNC: 14.1 G/DL — SIGNIFICANT CHANGE UP (ref 13–17)
IMM GRANULOCYTES NFR BLD AUTO: 0.2 % — SIGNIFICANT CHANGE UP (ref 0–0.9)
LYMPHOCYTES # BLD AUTO: 0.69 K/UL — LOW (ref 1–3.3)
LYMPHOCYTES # BLD AUTO: 15 % — SIGNIFICANT CHANGE UP (ref 13–44)
MCHC RBC-ENTMCNC: 34.1 G/DL — SIGNIFICANT CHANGE UP (ref 32–36)
MCHC RBC-ENTMCNC: 34.1 PG — HIGH (ref 27–34)
MCV RBC AUTO: 100 FL — SIGNIFICANT CHANGE UP (ref 80–100)
MONOCYTES # BLD AUTO: 0.57 K/UL — SIGNIFICANT CHANGE UP (ref 0–0.9)
MONOCYTES NFR BLD AUTO: 12.4 % — SIGNIFICANT CHANGE UP (ref 2–14)
NEUTROPHILS # BLD AUTO: 3.31 K/UL — SIGNIFICANT CHANGE UP (ref 1.8–7.4)
NEUTROPHILS NFR BLD AUTO: 71.8 % — SIGNIFICANT CHANGE UP (ref 43–77)
NRBC # BLD: 0 /100 WBCS — SIGNIFICANT CHANGE UP (ref 0–0)
PLATELET # BLD AUTO: 157 K/UL — SIGNIFICANT CHANGE UP (ref 150–400)
RBC # BLD: 4.14 M/UL — LOW (ref 4.2–5.8)
RBC # FLD: 11.5 % — SIGNIFICANT CHANGE UP (ref 10.3–14.5)
WBC # BLD: 4.61 K/UL — SIGNIFICANT CHANGE UP (ref 3.8–10.5)
WBC # FLD AUTO: 4.61 K/UL — SIGNIFICANT CHANGE UP (ref 3.8–10.5)

## 2023-09-11 PROCEDURE — 99214 OFFICE O/P EST MOD 30 MIN: CPT

## 2023-09-12 ENCOUNTER — TRANSCRIPTION ENCOUNTER (OUTPATIENT)
Age: 58
End: 2023-09-12

## 2023-09-12 DIAGNOSIS — H00.019 HORDEOLUM EXTERNUM UNSPECIFIED EYE, UNSPECIFIED EYELID: ICD-10-CM

## 2023-09-19 LAB
ALBUMIN MFR SERPL ELPH: 68.9 %
ALBUMIN SERPL ELPH-MCNC: 4.6 G/DL
ALBUMIN SERPL-MCNC: 4.2 G/DL
ALBUMIN/GLOB SERPL: 2.2 RATIO
ALP BLD-CCNC: 91 U/L
ALPHA1 GLOB MFR SERPL ELPH: 3.9 %
ALPHA1 GLOB SERPL ELPH-MCNC: 0.2 G/DL
ALPHA2 GLOB MFR SERPL ELPH: 9.9 %
ALPHA2 GLOB SERPL ELPH-MCNC: 0.6 G/DL
ALT SERPL-CCNC: 21 U/L
ANION GAP SERPL CALC-SCNC: 13 MMOL/L
AST SERPL-CCNC: 26 U/L
B-GLOBULIN MFR SERPL ELPH: 9.9 %
B-GLOBULIN SERPL ELPH-MCNC: 0.6 G/DL
B2 MICROGLOB SERPL-MCNC: 1.5 MG/L
BILIRUB SERPL-MCNC: 2.3 MG/DL
BUN SERPL-MCNC: 13 MG/DL
CALCIUM SERPL-MCNC: 9.5 MG/DL
CHLORIDE SERPL-SCNC: 106 MMOL/L
CO2 SERPL-SCNC: 24 MMOL/L
CREAT SERPL-MCNC: 0.95 MG/DL
DEPRECATED KAPPA LC FREE/LAMBDA SER: 2.14 RATIO
EGFR: 92 ML/MIN/1.73M2
GAMMA GLOB FLD ELPH-MCNC: 0.5 G/DL
GAMMA GLOB MFR SERPL ELPH: 7.4 %
GLUCOSE SERPL-MCNC: 94 MG/DL
IGA SER QL IEP: 57 MG/DL
IGG SER QL IEP: 525 MG/DL
IGM SER QL IEP: 21 MG/DL
INTERPRETATION SERPL IEP-IMP: NORMAL
KAPPA LC CSF-MCNC: 0.22 MG/DL
KAPPA LC SERPL-MCNC: 0.47 MG/DL
M PROTEIN SPEC IFE-MCNC: NORMAL
POTASSIUM SERPL-SCNC: 4.5 MMOL/L
PROT SERPL-MCNC: 6.1 G/DL
SODIUM SERPL-SCNC: 143 MMOL/L

## 2023-09-21 ENCOUNTER — RX RENEWAL (OUTPATIENT)
Age: 58
End: 2023-09-21

## 2023-10-09 ENCOUNTER — RESULT REVIEW (OUTPATIENT)
Age: 58
End: 2023-10-09

## 2023-10-09 ENCOUNTER — APPOINTMENT (OUTPATIENT)
Dept: HEMATOLOGY ONCOLOGY | Facility: CLINIC | Age: 58
End: 2023-10-09

## 2023-10-09 LAB
BASOPHILS # BLD AUTO: 0.01 K/UL — SIGNIFICANT CHANGE UP (ref 0–0.2)
BASOPHILS NFR BLD AUTO: 0.2 % — SIGNIFICANT CHANGE UP (ref 0–2)
EOSINOPHIL # BLD AUTO: 0.01 K/UL — SIGNIFICANT CHANGE UP (ref 0–0.5)
EOSINOPHIL NFR BLD AUTO: 0.2 % — SIGNIFICANT CHANGE UP (ref 0–6)
HCT VFR BLD CALC: 38.7 % — LOW (ref 39–50)
HGB BLD-MCNC: 13.5 G/DL — SIGNIFICANT CHANGE UP (ref 13–17)
IMM GRANULOCYTES NFR BLD AUTO: 0.4 % — SIGNIFICANT CHANGE UP (ref 0–0.9)
LYMPHOCYTES # BLD AUTO: 0.93 K/UL — LOW (ref 1–3.3)
LYMPHOCYTES # BLD AUTO: 16.7 % — SIGNIFICANT CHANGE UP (ref 13–44)
MCHC RBC-ENTMCNC: 34.3 PG — HIGH (ref 27–34)
MCHC RBC-ENTMCNC: 34.9 G/DL — SIGNIFICANT CHANGE UP (ref 32–36)
MCV RBC AUTO: 98.2 FL — SIGNIFICANT CHANGE UP (ref 80–100)
MONOCYTES # BLD AUTO: 0.59 K/UL — SIGNIFICANT CHANGE UP (ref 0–0.9)
MONOCYTES NFR BLD AUTO: 10.6 % — SIGNIFICANT CHANGE UP (ref 2–14)
NEUTROPHILS # BLD AUTO: 4.02 K/UL — SIGNIFICANT CHANGE UP (ref 1.8–7.4)
NEUTROPHILS NFR BLD AUTO: 71.9 % — SIGNIFICANT CHANGE UP (ref 43–77)
NRBC # BLD: 0 /100 WBCS — SIGNIFICANT CHANGE UP (ref 0–0)
PLATELET # BLD AUTO: 150 K/UL — SIGNIFICANT CHANGE UP (ref 150–400)
RBC # BLD: 3.94 M/UL — LOW (ref 4.2–5.8)
RBC # FLD: 11.4 % — SIGNIFICANT CHANGE UP (ref 10.3–14.5)
WBC # BLD: 5.58 K/UL — SIGNIFICANT CHANGE UP (ref 3.8–10.5)
WBC # FLD AUTO: 5.58 K/UL — SIGNIFICANT CHANGE UP (ref 3.8–10.5)

## 2023-10-13 LAB
ALBUMIN MFR SERPL ELPH: 67 %
ALBUMIN SERPL ELPH-MCNC: 4.4 G/DL
ALBUMIN SERPL-MCNC: 4.1 G/DL
ALBUMIN/GLOB SERPL: 2 RATIO
ALP BLD-CCNC: 90 U/L
ALPHA1 GLOB MFR SERPL ELPH: 4.1 %
ALPHA1 GLOB SERPL ELPH-MCNC: 0.3 G/DL
ALPHA2 GLOB MFR SERPL ELPH: 10.2 %
ALPHA2 GLOB SERPL ELPH-MCNC: 0.6 G/DL
ALT SERPL-CCNC: 22 U/L
ANION GAP SERPL CALC-SCNC: 11 MMOL/L
AST SERPL-CCNC: 32 U/L
B-GLOBULIN MFR SERPL ELPH: 10.6 %
B-GLOBULIN SERPL ELPH-MCNC: 0.6 G/DL
B2 MICROGLOB SERPL-MCNC: 1.6 MG/L
BILIRUB SERPL-MCNC: 2.2 MG/DL
BUN SERPL-MCNC: 11 MG/DL
CALCIUM SERPL-MCNC: 9.4 MG/DL
CHLORIDE SERPL-SCNC: 106 MMOL/L
CO2 SERPL-SCNC: 26 MMOL/L
CREAT SERPL-MCNC: 0.98 MG/DL
DEPRECATED KAPPA LC FREE/LAMBDA SER: 2.19 RATIO
EGFR: 89 ML/MIN/1.73M2
GAMMA GLOB FLD ELPH-MCNC: 0.5 G/DL
GAMMA GLOB MFR SERPL ELPH: 8.1 %
GLUCOSE SERPL-MCNC: 94 MG/DL
IGA SER QL IEP: 55 MG/DL
IGG SER QL IEP: 520 MG/DL
IGM SER QL IEP: 25 MG/DL
INTERPRETATION SERPL IEP-IMP: NORMAL
KAPPA LC CSF-MCNC: 0.26 MG/DL
KAPPA LC SERPL-MCNC: 0.57 MG/DL
LDH SERPL-CCNC: 229 U/L
M PROTEIN SPEC IFE-MCNC: NORMAL
POTASSIUM SERPL-SCNC: 4.6 MMOL/L
PROT SERPL-MCNC: 6.1 G/DL
SODIUM SERPL-SCNC: 143 MMOL/L

## 2023-10-31 ENCOUNTER — OUTPATIENT (OUTPATIENT)
Dept: OUTPATIENT SERVICES | Facility: HOSPITAL | Age: 58
LOS: 1 days | Discharge: HOME | End: 2023-10-31

## 2023-10-31 DIAGNOSIS — C90.00 MULTIPLE MYELOMA NOT HAVING ACHIEVED REMISSION: ICD-10-CM

## 2023-11-01 ENCOUNTER — TRANSCRIPTION ENCOUNTER (OUTPATIENT)
Age: 58
End: 2023-11-01

## 2023-11-06 ENCOUNTER — RESULT REVIEW (OUTPATIENT)
Age: 58
End: 2023-11-06

## 2023-11-06 ENCOUNTER — APPOINTMENT (OUTPATIENT)
Dept: HEMATOLOGY ONCOLOGY | Facility: CLINIC | Age: 58
End: 2023-11-06

## 2023-11-06 LAB
BASOPHILS # BLD AUTO: 0.01 K/UL — SIGNIFICANT CHANGE UP (ref 0–0.2)
BASOPHILS # BLD AUTO: 0.01 K/UL — SIGNIFICANT CHANGE UP (ref 0–0.2)
BASOPHILS NFR BLD AUTO: 0.2 % — SIGNIFICANT CHANGE UP (ref 0–2)
BASOPHILS NFR BLD AUTO: 0.2 % — SIGNIFICANT CHANGE UP (ref 0–2)
EOSINOPHIL # BLD AUTO: 0.01 K/UL — SIGNIFICANT CHANGE UP (ref 0–0.5)
EOSINOPHIL # BLD AUTO: 0.01 K/UL — SIGNIFICANT CHANGE UP (ref 0–0.5)
EOSINOPHIL NFR BLD AUTO: 0.2 % — SIGNIFICANT CHANGE UP (ref 0–6)
EOSINOPHIL NFR BLD AUTO: 0.2 % — SIGNIFICANT CHANGE UP (ref 0–6)
HCT VFR BLD CALC: 40.5 % — SIGNIFICANT CHANGE UP (ref 39–50)
HCT VFR BLD CALC: 40.5 % — SIGNIFICANT CHANGE UP (ref 39–50)
HGB BLD-MCNC: 13.9 G/DL — SIGNIFICANT CHANGE UP (ref 13–17)
HGB BLD-MCNC: 13.9 G/DL — SIGNIFICANT CHANGE UP (ref 13–17)
IMM GRANULOCYTES NFR BLD AUTO: 0.8 % — SIGNIFICANT CHANGE UP (ref 0–0.9)
IMM GRANULOCYTES NFR BLD AUTO: 0.8 % — SIGNIFICANT CHANGE UP (ref 0–0.9)
LYMPHOCYTES # BLD AUTO: 0.84 K/UL — LOW (ref 1–3.3)
LYMPHOCYTES # BLD AUTO: 0.84 K/UL — LOW (ref 1–3.3)
LYMPHOCYTES # BLD AUTO: 15.9 % — SIGNIFICANT CHANGE UP (ref 13–44)
LYMPHOCYTES # BLD AUTO: 15.9 % — SIGNIFICANT CHANGE UP (ref 13–44)
MCHC RBC-ENTMCNC: 34.3 G/DL — SIGNIFICANT CHANGE UP (ref 32–36)
MCHC RBC-ENTMCNC: 34.3 G/DL — SIGNIFICANT CHANGE UP (ref 32–36)
MCHC RBC-ENTMCNC: 34.3 PG — HIGH (ref 27–34)
MCHC RBC-ENTMCNC: 34.3 PG — HIGH (ref 27–34)
MCV RBC AUTO: 100 FL — SIGNIFICANT CHANGE UP (ref 80–100)
MCV RBC AUTO: 100 FL — SIGNIFICANT CHANGE UP (ref 80–100)
MONOCYTES # BLD AUTO: 0.57 K/UL — SIGNIFICANT CHANGE UP (ref 0–0.9)
MONOCYTES # BLD AUTO: 0.57 K/UL — SIGNIFICANT CHANGE UP (ref 0–0.9)
MONOCYTES NFR BLD AUTO: 10.8 % — SIGNIFICANT CHANGE UP (ref 2–14)
MONOCYTES NFR BLD AUTO: 10.8 % — SIGNIFICANT CHANGE UP (ref 2–14)
NEUTROPHILS # BLD AUTO: 3.82 K/UL — SIGNIFICANT CHANGE UP (ref 1.8–7.4)
NEUTROPHILS # BLD AUTO: 3.82 K/UL — SIGNIFICANT CHANGE UP (ref 1.8–7.4)
NEUTROPHILS NFR BLD AUTO: 72.1 % — SIGNIFICANT CHANGE UP (ref 43–77)
NEUTROPHILS NFR BLD AUTO: 72.1 % — SIGNIFICANT CHANGE UP (ref 43–77)
NRBC # BLD: 0 /100 WBCS — SIGNIFICANT CHANGE UP (ref 0–0)
NRBC # BLD: 0 /100 WBCS — SIGNIFICANT CHANGE UP (ref 0–0)
PLATELET # BLD AUTO: 154 K/UL — SIGNIFICANT CHANGE UP (ref 150–400)
PLATELET # BLD AUTO: 154 K/UL — SIGNIFICANT CHANGE UP (ref 150–400)
RBC # BLD: 4.05 M/UL — LOW (ref 4.2–5.8)
RBC # BLD: 4.05 M/UL — LOW (ref 4.2–5.8)
RBC # FLD: 11.7 % — SIGNIFICANT CHANGE UP (ref 10.3–14.5)
RBC # FLD: 11.7 % — SIGNIFICANT CHANGE UP (ref 10.3–14.5)
WBC # BLD: 5.29 K/UL — SIGNIFICANT CHANGE UP (ref 3.8–10.5)
WBC # BLD: 5.29 K/UL — SIGNIFICANT CHANGE UP (ref 3.8–10.5)
WBC # FLD AUTO: 5.29 K/UL — SIGNIFICANT CHANGE UP (ref 3.8–10.5)
WBC # FLD AUTO: 5.29 K/UL — SIGNIFICANT CHANGE UP (ref 3.8–10.5)

## 2023-11-10 LAB
ALBUMIN MFR SERPL ELPH: 67 %
ALBUMIN SERPL ELPH-MCNC: 4.8 G/DL
ALBUMIN SERPL-MCNC: 4.4 G/DL
ALBUMIN/GLOB SERPL: 2 RATIO
ALP BLD-CCNC: 96 U/L
ALPHA1 GLOB MFR SERPL ELPH: 4.2 %
ALPHA1 GLOB SERPL ELPH-MCNC: 0.3 G/DL
ALPHA2 GLOB MFR SERPL ELPH: 10.2 %
ALPHA2 GLOB SERPL ELPH-MCNC: 0.7 G/DL
ALT SERPL-CCNC: 23 U/L
ANION GAP SERPL CALC-SCNC: 13 MMOL/L
AST SERPL-CCNC: 28 U/L
B-GLOBULIN MFR SERPL ELPH: 10.5 %
B-GLOBULIN SERPL ELPH-MCNC: 0.7 G/DL
B2 MICROGLOB SERPL-MCNC: 1.5 MG/L
BILIRUB SERPL-MCNC: 2 MG/DL
BUN SERPL-MCNC: 17 MG/DL
CALCIUM SERPL-MCNC: 9.3 MG/DL
CHLORIDE SERPL-SCNC: 102 MMOL/L
CO2 SERPL-SCNC: 25 MMOL/L
CREAT SERPL-MCNC: 0.94 MG/DL
DEPRECATED KAPPA LC FREE/LAMBDA SER: 1.93 RATIO
EGFR: 93 ML/MIN/1.73M2
GAMMA GLOB FLD ELPH-MCNC: 0.5 G/DL
GAMMA GLOB MFR SERPL ELPH: 8.1 %
GLUCOSE SERPL-MCNC: 94 MG/DL
IGA SER QL IEP: 54 MG/DL
IGG SER QL IEP: 573 MG/DL
IGM SER QL IEP: 23 MG/DL
INTERPRETATION SERPL IEP-IMP: NORMAL
KAPPA LC CSF-MCNC: 0.29 MG/DL
KAPPA LC SERPL-MCNC: 0.56 MG/DL
LDH SERPL-CCNC: 208 U/L
M PROTEIN SPEC IFE-MCNC: NORMAL
POTASSIUM SERPL-SCNC: 4.6 MMOL/L
PROT SERPL-MCNC: 6.6 G/DL
SODIUM SERPL-SCNC: 141 MMOL/L

## 2023-11-30 ENCOUNTER — RX RENEWAL (OUTPATIENT)
Age: 58
End: 2023-11-30

## 2023-12-04 ENCOUNTER — TRANSCRIPTION ENCOUNTER (OUTPATIENT)
Age: 58
End: 2023-12-04

## 2023-12-14 ENCOUNTER — RX RENEWAL (OUTPATIENT)
Age: 58
End: 2023-12-14

## 2023-12-18 ENCOUNTER — RESULT REVIEW (OUTPATIENT)
Age: 58
End: 2023-12-18

## 2023-12-18 ENCOUNTER — APPOINTMENT (OUTPATIENT)
Dept: HEMATOLOGY ONCOLOGY | Facility: CLINIC | Age: 58
End: 2023-12-18
Payer: COMMERCIAL

## 2023-12-18 VITALS
OXYGEN SATURATION: 97 % | HEART RATE: 65 BPM | DIASTOLIC BLOOD PRESSURE: 79 MMHG | BODY MASS INDEX: 27.82 KG/M2 | WEIGHT: 196.65 LBS | RESPIRATION RATE: 16 BRPM | SYSTOLIC BLOOD PRESSURE: 121 MMHG | TEMPERATURE: 98 F

## 2023-12-18 LAB
BASOPHILS # BLD AUTO: 0.01 K/UL — SIGNIFICANT CHANGE UP (ref 0–0.2)
BASOPHILS # BLD AUTO: 0.01 K/UL — SIGNIFICANT CHANGE UP (ref 0–0.2)
BASOPHILS NFR BLD AUTO: 0.3 % — SIGNIFICANT CHANGE UP (ref 0–2)
BASOPHILS NFR BLD AUTO: 0.3 % — SIGNIFICANT CHANGE UP (ref 0–2)
EOSINOPHIL # BLD AUTO: 0.01 K/UL — SIGNIFICANT CHANGE UP (ref 0–0.5)
EOSINOPHIL # BLD AUTO: 0.01 K/UL — SIGNIFICANT CHANGE UP (ref 0–0.5)
EOSINOPHIL NFR BLD AUTO: 0.3 % — SIGNIFICANT CHANGE UP (ref 0–6)
EOSINOPHIL NFR BLD AUTO: 0.3 % — SIGNIFICANT CHANGE UP (ref 0–6)
HCT VFR BLD CALC: 38.9 % — LOW (ref 39–50)
HCT VFR BLD CALC: 38.9 % — LOW (ref 39–50)
HGB BLD-MCNC: 13.3 G/DL — SIGNIFICANT CHANGE UP (ref 13–17)
HGB BLD-MCNC: 13.3 G/DL — SIGNIFICANT CHANGE UP (ref 13–17)
IMM GRANULOCYTES NFR BLD AUTO: 0.3 % — SIGNIFICANT CHANGE UP (ref 0–0.9)
IMM GRANULOCYTES NFR BLD AUTO: 0.3 % — SIGNIFICANT CHANGE UP (ref 0–0.9)
LYMPHOCYTES # BLD AUTO: 0.79 K/UL — LOW (ref 1–3.3)
LYMPHOCYTES # BLD AUTO: 0.79 K/UL — LOW (ref 1–3.3)
LYMPHOCYTES # BLD AUTO: 21.9 % — SIGNIFICANT CHANGE UP (ref 13–44)
LYMPHOCYTES # BLD AUTO: 21.9 % — SIGNIFICANT CHANGE UP (ref 13–44)
MCHC RBC-ENTMCNC: 34.2 G/DL — SIGNIFICANT CHANGE UP (ref 32–36)
MCHC RBC-ENTMCNC: 34.2 G/DL — SIGNIFICANT CHANGE UP (ref 32–36)
MCHC RBC-ENTMCNC: 34.3 PG — HIGH (ref 27–34)
MCHC RBC-ENTMCNC: 34.3 PG — HIGH (ref 27–34)
MCV RBC AUTO: 100.3 FL — HIGH (ref 80–100)
MCV RBC AUTO: 100.3 FL — HIGH (ref 80–100)
MONOCYTES # BLD AUTO: 0.58 K/UL — SIGNIFICANT CHANGE UP (ref 0–0.9)
MONOCYTES # BLD AUTO: 0.58 K/UL — SIGNIFICANT CHANGE UP (ref 0–0.9)
MONOCYTES NFR BLD AUTO: 16.1 % — HIGH (ref 2–14)
MONOCYTES NFR BLD AUTO: 16.1 % — HIGH (ref 2–14)
NEUTROPHILS # BLD AUTO: 2.21 K/UL — SIGNIFICANT CHANGE UP (ref 1.8–7.4)
NEUTROPHILS # BLD AUTO: 2.21 K/UL — SIGNIFICANT CHANGE UP (ref 1.8–7.4)
NEUTROPHILS NFR BLD AUTO: 61.1 % — SIGNIFICANT CHANGE UP (ref 43–77)
NEUTROPHILS NFR BLD AUTO: 61.1 % — SIGNIFICANT CHANGE UP (ref 43–77)
NRBC # BLD: 0 /100 WBCS — SIGNIFICANT CHANGE UP (ref 0–0)
NRBC # BLD: 0 /100 WBCS — SIGNIFICANT CHANGE UP (ref 0–0)
PLATELET # BLD AUTO: 141 K/UL — LOW (ref 150–400)
PLATELET # BLD AUTO: 141 K/UL — LOW (ref 150–400)
RBC # BLD: 3.88 M/UL — LOW (ref 4.2–5.8)
RBC # BLD: 3.88 M/UL — LOW (ref 4.2–5.8)
RBC # FLD: 11.4 % — SIGNIFICANT CHANGE UP (ref 10.3–14.5)
RBC # FLD: 11.4 % — SIGNIFICANT CHANGE UP (ref 10.3–14.5)
WBC # BLD: 3.61 K/UL — LOW (ref 3.8–10.5)
WBC # BLD: 3.61 K/UL — LOW (ref 3.8–10.5)
WBC # FLD AUTO: 3.61 K/UL — LOW (ref 3.8–10.5)
WBC # FLD AUTO: 3.61 K/UL — LOW (ref 3.8–10.5)

## 2023-12-18 PROCEDURE — 99214 OFFICE O/P EST MOD 30 MIN: CPT

## 2023-12-21 ENCOUNTER — APPOINTMENT (OUTPATIENT)
Dept: UROLOGY | Facility: CLINIC | Age: 58
End: 2023-12-21
Payer: COMMERCIAL

## 2023-12-21 VITALS
SYSTOLIC BLOOD PRESSURE: 124 MMHG | HEART RATE: 60 BPM | TEMPERATURE: 97.5 F | RESPIRATION RATE: 16 BRPM | DIASTOLIC BLOOD PRESSURE: 86 MMHG | OXYGEN SATURATION: 96 %

## 2023-12-21 DIAGNOSIS — C61 MALIGNANT NEOPLASM OF PROSTATE: ICD-10-CM

## 2023-12-21 PROCEDURE — 99214 OFFICE O/P EST MOD 30 MIN: CPT

## 2023-12-21 NOTE — CONSULT LETTER
[Dear  ___] : Dear ~ARELY, [Courtesy Letter:] : I had the pleasure of seeing your patient, [unfilled], in my office today. [Please see my note below.] : Please see my note below. [Referral Closing:] : Thank you very much for seeing this patient.  If you have any questions, please do not hesitate to contact me. [Sincerely,] : Sincerely, [FreeTextEntry3] : Pranav Hong DO, MSc\par  , [facility]\par  Attending Physician - Malignant Hematology\par  [facility]\par  [address] [address]\par  Tel: [phone]\par  Fax: [phone]

## 2023-12-21 NOTE — ASSESSMENT
[FreeTextEntry1] : 59 yo male with high risk IgA lambda Multiple Myeloma (Dx 2019) currently in a morphologic and serologic CR at last assessment here for further monitoring and management. He has been on 3 different treatment / maintenance regimens (including VRd, KRd, Venetoclax + Ninlaro) all in the front-line setting. 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 His last TTE in Feb 2022 showed heart failure with reduced ejection fraction at 39%. He had been off all maintenance therapy since 11/10/2021 but restarted again in June 2022.  During our first visit on 3/30/22 we ordered immunoelectrophoresis studies which did not detect a monoclonal protein or elevated FLC in either the serum or urine. A repeat PET-CT was performed on 4/30/22 which did not show any areas of disease. In addition we performed a BM biopsy and aspirate on 4/8/22 in which no morphologic, cytogenetic, or immunophenotypic features of plasma cell myeloma were identified thus showing continued CR. Clonoseq MRD testing was sent on the first 1.5 - 2 mL aspirate pull which showed the presence of a residual sequence below the limit of detection (">0-1 residual clonal cells per million nucleated cell"). Only 1 of the original 3 sequences was detected (IGH Sequence A). Sequences for IGK and IGL patient-specific mutations were not detected.  He continues to be co-managed by Dr Chapin at [facility]. In June 2022, he was started back on maintenance Ninlaro and Venetoclax with the total goal of 5 years of therapy. Repeat clonoseq testing in April 26, 2023 again showed MRD to the level of >0-<1 / 1,000,000 cells. In addition, he will continue to undergo annual PET-CT and clonoseq MRD testing with the next testing planned in April 2024. Dr Chapin will continue to be involved in Mr Marie care and plans to see him every 6 months via telemedicine.  Plan: - Repeat myeloma panel (Pre-V) once every 12 weeks. - CBCs at every 4 weeks due to maintenance therapy - Continue Venetoclax and Ninlaro therapy as above, discussed results of most recent testing. - Asymptomatic increase in t-bili on CMP related to Gilbert syndrome, no action required - Prefer serial BM monitoring (peripheral blood has reduced sensitivity) with clonoseq MRD testing every 12 months, PET-CT, next due in April 2023 - Healthcare Maintenance: COVID-19 vaccinated, continue with booster doses per current CDC guidelines, Pneumonia vaccinated with Prevnar 20. Completed shingles vaccination. Annual derm visit recommended. - Prostate Ca: Kay score 6, currently under observation. - Send note, BM reports and PET-CT reports to Dr Chapin's Office fax [phone] - Follow up in 12 weeks  ___ I personally have spent a total of 30 minutes of time on the date of this encounter reviewing test results, documenting findings, providing education, coordinating care and directly consulting with the patient.

## 2023-12-21 NOTE — HISTORY OF PRESENT ILLNESS
[Disease:__________________________] : Disease: [unfilled] [Treatment Protocol] : Treatment Protocol [Date: ____________] : Patient's last distress assessment performed on [unfilled]. 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 Fib / A. Flutter (post-surgical, s/p cardioversion, not on AC), lumbar radiculopathy (s/p lumbar microdiscectomy 3/15/22) here for further management of his high-risk (TP53 gene deletion, del13q, t(11;14)) IgA Lambda multiple myeloma initially diagnosed in 2019. He presents here as a transfer of care related to change in insurance from [facility] system where he was most recently followed by Dr Mian Chapin.  He was diagnosed with IgA lambda multiple myeloma with anemia (no bone lesions - on skeletal survey, renal issues or hypercalcemia) in May 2019. He was started on Revlimid, Velcade and Dexamethasone (RVd) in June 2019 under the care of Dr Sharif Arteaga (oncologist associated with [facility]). He reports he developed eye styes / chalazia from Velcade and subsequently discontinued the medication. He may have had a brief time on Kyprolis with Rev and Dex (he believes it was started by Dr Chapin), however he developed severe back spasms on the Revlimid and this led to its discontinuation. He also had insomnia while taking steroids. He switched his care over to [facility] in Oct 2019.  In January 2020 he had a repeat BM biopsy and was found to have a CR with undetectable M-spike and normalization of his FLC. During this time he was being seen by Dr Chapin at [facility], who advised against autologous stem cell transplantation in first CR. He then underwent stem cell collection (stored at a cryobank at [facility]) and he was subsequently started on therapy with Ninlaro and Venetoclax maintenance (still in the front-line setting). 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 His maintenance therapy was discontinued Nov 10, 2021 and has yet to be restarted. His LVEF while on RVd in Oct 2019 was estimated at 69% ([facility]). His most recent LVEF checked at [facility] in Feb 2022 was reduced down to 39%. He has been following cardiology for optimization of his heart failure medications, for which he is taking metoprolol and losartan.  Since achieving a CR in 2020, imaging with PET-CT and Bone marrow biopsies up to this point have been negative for residual disease and lesions. His last BM biopsy was in June 2021 at [facility]. He did have clonoseq testing for the ID sample, but has yet to have any MRD measurements. He was previously on Ninlaro 3 mg once weekly for 3/4 weeks every 28 day cycle and venetoclax was taken at 400 mg PO 5/7 days a week of every 28 day cycle.  He reports that he had back surgery in March 2022. He was having back pain which is when they discovered he has herniated disks L4 and L5 and underwent microdiscectomy on 3/15/22. He has had a change in insurance to [employer], which is no longer covered at [facility] and he came to [facility] to discuss further management.  Today he overall feels well. He has not experienced any changes in his health since his back surgery. He is currently not on any myeloma-directed therapy. He denies any new bone pain, fevers, chills, night sweats, neuropathic pain, urinary / kidney issues.  ================================================================== Treatment Hx:  VRd - unspecified number of cycles, started June 2019 KRd? - unclear Ninlaro + Venetoclax (last dose 11/10/2021)  ================================================================== Care Providers:  PMD: Dr Apolinar Ballard ([facility]) Cardiology: Dr Gurwinder Roblero ([facility]) Oncologist / Hematologist: Dr Mian Chapin ([facility]) Tel: [phone], Fax: [phone]    ================================================================== [de-identified] : ? [de-identified] : Outside pathology at [facility]:\par  BM biopsy and aspirate May 2019: \par  BM core - numerous plasma cells estimated up to 70%.\par  BM aspirate - 40% lambda restricted plasma cells\par  \par  FISH: t(11;14) CCND1-IGH gene in 45% of cells, deletion 13q14.3 region in 40% of cells and TP53 gene deletion in 28% of cells. [de-identified] : 3/30/22: Initial visit.  5/25/22: Follow-up. He has been feeling well overall. He has joined the gym 3 weeks ago and has been more active. He recently went on a cruise without any limitations in his activities. He feels he has more energy lately. His next echocardiogram is in September. No new lumps or bumps.  Weight is stable and appetite is good. He denies any constitutional complaints. He has a planned virtual appointment with Dr. Mian Chapin on 6/21/22.  8/15/22: Follow-up. He is feeling well overall. He has been swimming often at either [place] or town pools. No issues with exercise tolerance. No chest pain, palpitations or shortness of breath.  He is seeing the cardiologist at the end of September. He takes Ninlaro on Wednesdays once a week for 3 weeks on and 1 week off. No side effects to the medications besides belching. No bleeding complications or bruising. No blood in the stool, no bleeding from gums. No new bone pains. No weight loss.   10/24/22: Follow-up. No complaints. Has gained some weight. No side effects from medications. No recent illnesses.  12/19/22: Follow-up. He continues to have chronic back pain related to degenerative changes.Otherwise no significant changes in health. No medication changes. 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 He continues to tolerate treatment well without any significant adverse effects or cytopenias.  3/15/23: Follow-up. He reports continued chronic back pain. No new bone pains. He also has fatigue. He has not experienced any constitutional issues. He has gained weight. No recent illnesses. His blood counts remain stable on therapy.  6/12/23: Follow-up. Has been experiencing generalized fatigue. No other issues. He has not noticed any side effects from therapy. Blood counts are stable / normal today.   9/11/23: Follow-up. Overall he is feeling well without any major complaints. He has a hang-over like effect the following day and headache with each dose of Ninlaro. He denies cough, SOB at rest or on exertion, claudication, abdominal complaints, no n/v/d. Denies fever/chills, night sweats, unexpected weight loss.  He denies experiencing new / consistent bone pains and has not noticed any new lumps or masses on his body.  12/18/23: Follow-up. Back in November he went a cruise and developed laryngitis likely viral as his wife also developed it. Last week he developed taste loss for 1.5 days. He now is starting to feel better, but he still feels tired. His night-time wheezing is resolved. He feels congested now and has the feeling of swollen glands.  A comprehensive review of systems was performed including constitutional, eyes, ENT, cardiovascular, respiratory, gastrointestinal, genitourinary, musculoskeletal, integumentary, neurological, psychiatric and hematologic / lymphatic. All pertinent positives are included in the H&P under interval history above and the remaining review of systems listed are negative.   ========================================================== Treatment Hx:   Maintenance with Ninlaro (weekly day 1,8,15) + Venetoclax (M-F) on 28-day cycles - Started back March 2022  ========================================================== [FreeTextEntry1] : Ninlaro 3 weeks on and 1 week off + Venetoclax 400 mg M-F (Sat and Sun off) continuously

## 2023-12-21 NOTE — RESULTS/DATA
[FreeTextEntry1] : =========================================================================\par   EXAM: [id number] - PETCT WB ONC FDG SUBS  - ORDERED BY: ASIF HARTMANN\par  \par  PROCEDURE DATE:  04/30/2022  \par   \par  INTERPRETATION:  PROCEDURE:  PET/CT WHOLE BODY ([id number])\par  \par  CLINICAL INFORMATION: 57-year-old male diagnosed with multiple myeloma in \par  2019, status post therapy. PET/CT is done as part of subsequent treatment \par  strategy evaluation.\par  \par  RADIOPHARMACEUTICAL:  11.64 mCi F-18, FDG, I.V.\par  \par  TECHNIQUE:  Fasting blood sugar measured prior to injection of \par  radiopharmaceutical was 78 mg/dl. Following intravenous injection of the \par  radiopharmaceutical and an uptake period of approximately 60 minutes, \par  FDG-PET/CT imaging was performed on a Siemens Biograph mCT 64 PET-CT from \par  vertex of the skull to the toes. Oral contrast was given during the \par  uptake period. CT was performed during shallow respiration. The CT \par  protocol was optimized for PET attenuation correction and anatomic \par  localization. The CT protocol was not designed to produce and cannot \par  replace state-of-the-art diagnostic CT images with specific imaging \par  protocols for different body parts and indications. Images were reviewed \par  on a dedicated workstation using multiplanar reconstruction.\par  \par  The standardized uptake values (SUV) are normalized to patient body \par  weight and indicate the highest activity concentration (SUVmax) in a \par  given disease site. All image numbers refer to axial image number.\par  \par  COMPARISON:  No prior PET/CT is available for prior PET/CT..\par  \par  OTHER STUDIES USED FOR CORRELATION: MRI of lumbar spine from 2/7/2022 and \par  CT angiogram chest from 2/6/2022.\par  \par  FINDINGS:\par  \par  HEAD/NECK: Physiologic FDG activity in the brain, major salivary glands, \par  and neck muscles.\par  CHEST: No abnormal FDG activity. No lymphadenopathy.\par  LUNGS: No abnormal FDG activity. No nodule.\par  PLEURA/PERICARDIUM:Physiologic FDG activity. No pleural or pericardial \par  effusion.\par  \par  HEPATOBILIARY/PANCREAS: Physiologic FDG activity. Liver SUV mean is 2.7.\par  SPLEEN: Physiologic FDG activity. Normal in size.\par  ADRENAL GLANDS: No abnormal FDG activity. No pleural or pericardial \par  effusion.\par  KIDNEYS/URINARY BLADDER: Physiologic excreted FDG activity.\par  PELVIC ORGANS: No abnormal FDG activity.\par  ABDOMINOPELVIC NODES: No enlarged or FDG avid lymph nodes.\par  BOWEL/PERITONEUM/MESENTERY: No abnormal FDG activity.\par  BONES/SOFT TISSUES:Nonspecific heterogeneous FDG activity in the spinous \par  processes of L3 to L5 with no corresponding abnormalities on CT, and \par  associated overlying soft tissue stranding (SUV 9.7; image 251).\par  \par  IMPRESSION:  FDG-PET/CT scan demonstrates:\par  \par  Nonspecific heterogeneous FDG activity in the spinous processes of L3-L5 without CT correlate, with associated mildly FDG avid overlying soft tissue stranding. This may be secondary to post traumatic changes. Please correlate clinically.\par  \par  Direct comparison will be performed and an addendum issued when prior PET/CT is made available.\par  \par  --- End of Report ---\par  \par  BEN LOYA MD; Attending Nuclear Medicine \par  This document has been electronically signed. May  1 2022  2:57PM\par  \par  =========================================================================

## 2023-12-22 LAB
ALBUMIN MFR SERPL ELPH: 66.9 %
ALBUMIN SERPL ELPH-MCNC: 4.3 G/DL
ALBUMIN SERPL-MCNC: 3.9 G/DL
ALBUMIN/GLOB SERPL: 2 RATIO
ALP BLD-CCNC: 93 U/L
ALPHA1 GLOB MFR SERPL ELPH: 4.3 %
ALPHA1 GLOB SERPL ELPH-MCNC: 0.3 G/DL
ALPHA2 GLOB MFR SERPL ELPH: 10.5 %
ALPHA2 GLOB SERPL ELPH-MCNC: 0.6 G/DL
ALT SERPL-CCNC: 22 U/L
ANION GAP SERPL CALC-SCNC: 10 MMOL/L
AST SERPL-CCNC: 25 U/L
B-GLOBULIN MFR SERPL ELPH: 10.1 %
B-GLOBULIN SERPL ELPH-MCNC: 0.6 G/DL
B2 MICROGLOB SERPL-MCNC: 1.7 MG/L
BILIRUB SERPL-MCNC: 1.3 MG/DL
BUN SERPL-MCNC: 11 MG/DL
CALCIUM SERPL-MCNC: 9.4 MG/DL
CHLORIDE SERPL-SCNC: 103 MMOL/L
CO2 SERPL-SCNC: 27 MMOL/L
CREAT SERPL-MCNC: 0.9 MG/DL
DEPRECATED KAPPA LC FREE/LAMBDA SER: 1.93 RATIO
EGFR: 98 ML/MIN/1.73M2
GAMMA GLOB FLD ELPH-MCNC: 0.5 G/DL
GAMMA GLOB MFR SERPL ELPH: 8.2 %
GLUCOSE SERPL-MCNC: 97 MG/DL
IGA SER QL IEP: 57 MG/DL
IGG SER QL IEP: 533 MG/DL
IGM SER QL IEP: 28 MG/DL
INTERPRETATION SERPL IEP-IMP: NORMAL
KAPPA LC CSF-MCNC: 0.3 MG/DL
KAPPA LC SERPL-MCNC: 0.58 MG/DL
LDH SERPL-CCNC: 184 U/L
M PROTEIN SPEC IFE-MCNC: NORMAL
POTASSIUM SERPL-SCNC: 4.2 MMOL/L
PROT SERPL-MCNC: 5.9 G/DL
PSA FREE FLD-MCNC: 12 %
PSA FREE SERPL-MCNC: 0.7 NG/ML
PSA SERPL-MCNC: 5.84 NG/ML
SODIUM SERPL-SCNC: 140 MMOL/L

## 2023-12-22 NOTE — DISEASE MANAGEMENT
[2] : T2 [a] : a [X] : MX [0-10] : 0 -10 ng/mL [Biopsy with Fusion] : Patient had a biopsy with fusion on [6] : Fusion Biopsy Kay Score: 6 [Biopsy results sent to PCP/Referring Physician] : Biopsy results sent to PCP/Referring Physician [] : Patient had a Prostate MRI [4] : 4 [BiopsyDate] : 6/27/23 [TotalCores] : 13 [MeasuredProstateVolume] : 67 [TotalPositiveCores] : 2 [MaxCoreInvolvement] : 10 [I] : I [Active Surveillance] : Active Surveillance

## 2023-12-22 NOTE — HISTORY OF PRESENT ILLNESS
[FreeTextEntry1] : Samm Marie returns to the office today.  He is a 58-year-old man previously diagnosed with low-grade prostate cancer earlier this year and has been advised to consider active surveillance which he has decided to pursue.  He is previously a patient of Dr. Wells and is now transferring his care to me.  He has history of multiple myeloma and had been treated for this in the past and has been on ongoing therapy through his medical oncologist with the plan to discontinue that maintenance therapy after a total of 5 years which is coming up in about 6 months from now.  His prostate biopsy was performed in late June 2023.  This was done after MRI of the prostate showed a very small PI-RADS category 4 lesion measuring 5 x 6 mm in largest dimensions.  Biopsies from that target area demonstrated small volume Bronte 3+3 disease and there was 1 additional sample also showing small volume Kay 3+3 prostate cancer.  Based on the MRI measurements, his prostate measured 67 cm.  His PSA density calculated at the time of that MRI was 0.07 ng/mL/mL.  His PSA level had increased from 3.46 in May 2022 up to 4.40 in May 2023.

## 2024-01-02 NOTE — ED ADULT NURSE NOTE - IN THE PAST 12 MONTHS HAVE YOU USED DRUGS OTHER THAN THOSE REQUIRED FOR MEDICAL REASON?
Suzanna Maria (:  1989) is a 33 y.o. female,Established patient, here for evaluation of the following chief complaint(s):  Opioid dependence         ASSESSMENT/PLAN:  1. Uncomplicated opioid dependence (HCC)  On SUBOXONE 2-0.5 MG FILM SL film; Place 1/2 Film under the tongue in the morning for 14 days., Disp-14 Film, R-0, HAY Normal   Pt is here today for follow up in our addiction clinic.  No cravings and no withdrawals. She brought in meeting sheet with 15 signatures on it. No allergic reactions, no reactions and able to ADLs. OARRs is reviewed and consistent. UA is consistent .  Will continue current meds.Weaned off off to 1 mg a day. She could not come off this month. Discussed Vivitrol.         Subjective   SUBJECTIVE/OBJECTIVE:  History of Present Illness    Patient Identification  Suzanna Maria is a 33 y.o. female.    Patient information was obtained from patient.  History/Exam limitations: none.  Patient presented to the Emergency Department ambulatory.    Chief Complaint   No chief complaint on file.    Pt is here today for follow up in our addiction clinic. She is due refill of Suboxone 2 mg-0.5 mg sublingual film place 1/2  films every day by sublingual route as needed for 28 days. No cravings and no withdrawals. She brought in meeting sheet with 11 signatures on it. No allergic reactions, no reactions and able to ADLs.  OARRs is not reviewable today. UA in house  is consistent.    Past Medical History:  No date: Hepatitis C  History reviewed.  No pertinent family history.    Current Outpatient Medications:  SUBOXONE 2-0.5 MG FILM SL film, Place 1 Film under the tongue in the morning for 28 days., Disp: 28 Film, Rfl: 0  gabapentin (NEURONTIN) 300 MG capsule, Take 300 mg by mouth 3 times daily (Patient not taking: Reported on 7/15/2022), Disp: , Rfl:     No current facility-administered medications for this visit.    No Known Allergies  Social History    Socioeconomic History      Marital status:  No

## 2024-01-08 ENCOUNTER — NON-APPOINTMENT (OUTPATIENT)
Age: 59
End: 2024-01-08

## 2024-01-12 ENCOUNTER — OUTPATIENT (OUTPATIENT)
Dept: OUTPATIENT SERVICES | Facility: HOSPITAL | Age: 59
LOS: 1 days | Discharge: HOME | End: 2024-01-12

## 2024-01-12 DIAGNOSIS — C90.00 MULTIPLE MYELOMA NOT HAVING ACHIEVED REMISSION: ICD-10-CM

## 2024-01-16 ENCOUNTER — RESULT REVIEW (OUTPATIENT)
Age: 59
End: 2024-01-16

## 2024-01-16 ENCOUNTER — APPOINTMENT (OUTPATIENT)
Dept: HEMATOLOGY ONCOLOGY | Facility: CLINIC | Age: 59
End: 2024-01-16

## 2024-01-16 LAB
BASOPHILS # BLD AUTO: 0.01 K/UL — SIGNIFICANT CHANGE UP (ref 0–0.2)
BASOPHILS NFR BLD AUTO: 0.2 % — SIGNIFICANT CHANGE UP (ref 0–2)
EOSINOPHIL # BLD AUTO: 0 K/UL — SIGNIFICANT CHANGE UP (ref 0–0.5)
EOSINOPHIL NFR BLD AUTO: 0 % — SIGNIFICANT CHANGE UP (ref 0–6)
HCT VFR BLD CALC: 39.4 % — SIGNIFICANT CHANGE UP (ref 39–50)
HGB BLD-MCNC: 13.5 G/DL — SIGNIFICANT CHANGE UP (ref 13–17)
IMM GRANULOCYTES NFR BLD AUTO: 0.4 % — SIGNIFICANT CHANGE UP (ref 0–0.9)
LYMPHOCYTES # BLD AUTO: 0.95 K/UL — LOW (ref 1–3.3)
LYMPHOCYTES # BLD AUTO: 18.2 % — SIGNIFICANT CHANGE UP (ref 13–44)
MCHC RBC-ENTMCNC: 34.3 G/DL — SIGNIFICANT CHANGE UP (ref 32–36)
MCHC RBC-ENTMCNC: 34.3 PG — HIGH (ref 27–34)
MCV RBC AUTO: 100 FL — SIGNIFICANT CHANGE UP (ref 80–100)
MONOCYTES # BLD AUTO: 0.66 K/UL — SIGNIFICANT CHANGE UP (ref 0–0.9)
MONOCYTES NFR BLD AUTO: 12.6 % — SIGNIFICANT CHANGE UP (ref 2–14)
NEUTROPHILS # BLD AUTO: 3.59 K/UL — SIGNIFICANT CHANGE UP (ref 1.8–7.4)
NEUTROPHILS NFR BLD AUTO: 68.6 % — SIGNIFICANT CHANGE UP (ref 43–77)
NRBC # BLD: 0 /100 WBCS — SIGNIFICANT CHANGE UP (ref 0–0)
PLATELET # BLD AUTO: 154 K/UL — SIGNIFICANT CHANGE UP (ref 150–400)
RBC # BLD: 3.94 M/UL — LOW (ref 4.2–5.8)
RBC # FLD: 11.7 % — SIGNIFICANT CHANGE UP (ref 10.3–14.5)
WBC # BLD: 5.23 K/UL — SIGNIFICANT CHANGE UP (ref 3.8–10.5)
WBC # FLD AUTO: 5.23 K/UL — SIGNIFICANT CHANGE UP (ref 3.8–10.5)

## 2024-01-23 LAB
ALBUMIN MFR SERPL ELPH: 67.8 %
ALBUMIN SERPL ELPH-MCNC: 4.5 G/DL
ALBUMIN SERPL-MCNC: 4.1 G/DL
ALBUMIN/GLOB SERPL: 2.2 RATIO
ALP BLD-CCNC: 84 U/L
ALPHA1 GLOB MFR SERPL ELPH: 4.6 %
ALPHA1 GLOB SERPL ELPH-MCNC: 0.3 G/DL
ALPHA2 GLOB MFR SERPL ELPH: 9.6 %
ALPHA2 GLOB SERPL ELPH-MCNC: 0.6 G/DL
ALT SERPL-CCNC: 29 U/L
ANION GAP SERPL CALC-SCNC: 11 MMOL/L
AST SERPL-CCNC: 35 U/L
B-GLOBULIN MFR SERPL ELPH: 9.8 %
B-GLOBULIN SERPL ELPH-MCNC: 0.6 G/DL
B2 MICROGLOB SERPL-MCNC: 1.4 MG/L
BILIRUB SERPL-MCNC: 1.9 MG/DL
BUN SERPL-MCNC: 17 MG/DL
CALCIUM SERPL-MCNC: 9.2 MG/DL
CHLORIDE SERPL-SCNC: 104 MMOL/L
CO2 SERPL-SCNC: 25 MMOL/L
CREAT SERPL-MCNC: 0.91 MG/DL
DEPRECATED KAPPA LC FREE/LAMBDA SER: 1.67 RATIO
EGFR: 97 ML/MIN/1.73M2
GAMMA GLOB FLD ELPH-MCNC: 0.5 G/DL
GAMMA GLOB MFR SERPL ELPH: 8.2 %
GLUCOSE SERPL-MCNC: 109 MG/DL
IGA SER QL IEP: 51 MG/DL
IGG SER QL IEP: 559 MG/DL
IGM SER QL IEP: 15 MG/DL
INTERPRETATION SERPL IEP-IMP: NORMAL
KAPPA LC CSF-MCNC: 0.27 MG/DL
KAPPA LC SERPL-MCNC: 0.45 MG/DL
LDH SERPL-CCNC: 227 U/L
M PROTEIN SPEC IFE-MCNC: NORMAL
POTASSIUM SERPL-SCNC: 4.5 MMOL/L
PROT SERPL-MCNC: 6 G/DL
PROT SERPL-MCNC: 6 G/DL
PROT SERPL-MCNC: 6.1 G/DL
SODIUM SERPL-SCNC: 141 MMOL/L

## 2024-02-12 ENCOUNTER — RESULT REVIEW (OUTPATIENT)
Age: 59
End: 2024-02-12

## 2024-02-12 ENCOUNTER — APPOINTMENT (OUTPATIENT)
Dept: HEMATOLOGY ONCOLOGY | Facility: CLINIC | Age: 59
End: 2024-02-12

## 2024-02-12 LAB
BASOPHILS # BLD AUTO: 0 K/UL — SIGNIFICANT CHANGE UP (ref 0–0.2)
BASOPHILS NFR BLD AUTO: 0 % — SIGNIFICANT CHANGE UP (ref 0–2)
EOSINOPHIL # BLD AUTO: 0.01 K/UL — SIGNIFICANT CHANGE UP (ref 0–0.5)
EOSINOPHIL NFR BLD AUTO: 0.2 % — SIGNIFICANT CHANGE UP (ref 0–6)
HCT VFR BLD CALC: 41.6 % — SIGNIFICANT CHANGE UP (ref 39–50)
HGB BLD-MCNC: 14.1 G/DL — SIGNIFICANT CHANGE UP (ref 13–17)
IMM GRANULOCYTES NFR BLD AUTO: 0.4 % — SIGNIFICANT CHANGE UP (ref 0–0.9)
LYMPHOCYTES # BLD AUTO: 0.73 K/UL — LOW (ref 1–3.3)
LYMPHOCYTES # BLD AUTO: 14.4 % — SIGNIFICANT CHANGE UP (ref 13–44)
MCHC RBC-ENTMCNC: 33.9 G/DL — SIGNIFICANT CHANGE UP (ref 32–36)
MCHC RBC-ENTMCNC: 34.1 PG — HIGH (ref 27–34)
MCV RBC AUTO: 100.5 FL — HIGH (ref 80–100)
MONOCYTES # BLD AUTO: 0.84 K/UL — SIGNIFICANT CHANGE UP (ref 0–0.9)
MONOCYTES NFR BLD AUTO: 16.6 % — HIGH (ref 2–14)
NEUTROPHILS # BLD AUTO: 3.46 K/UL — SIGNIFICANT CHANGE UP (ref 1.8–7.4)
NEUTROPHILS NFR BLD AUTO: 68.4 % — SIGNIFICANT CHANGE UP (ref 43–77)
NRBC # BLD: 0 /100 WBCS — SIGNIFICANT CHANGE UP (ref 0–0)
PLATELET # BLD AUTO: 146 K/UL — LOW (ref 150–400)
RBC # BLD: 4.14 M/UL — LOW (ref 4.2–5.8)
RBC # FLD: 11.7 % — SIGNIFICANT CHANGE UP (ref 10.3–14.5)
WBC # BLD: 5.06 K/UL — SIGNIFICANT CHANGE UP (ref 3.8–10.5)
WBC # FLD AUTO: 5.06 K/UL — SIGNIFICANT CHANGE UP (ref 3.8–10.5)

## 2024-02-14 LAB
ALBUMIN MFR SERPL ELPH: 68.1 %
ALBUMIN SERPL ELPH-MCNC: 4.5 G/DL
ALBUMIN SERPL-MCNC: 4.2 G/DL
ALBUMIN/GLOB SERPL: 2.2 RATIO
ALP BLD-CCNC: 83 U/L
ALPHA1 GLOB MFR SERPL ELPH: 3.7 %
ALPHA1 GLOB SERPL ELPH-MCNC: 0.2 G/DL
ALPHA2 GLOB MFR SERPL ELPH: 9.9 %
ALPHA2 GLOB SERPL ELPH-MCNC: 0.6 G/DL
ALT SERPL-CCNC: 27 U/L
ANION GAP SERPL CALC-SCNC: 11 MMOL/L
AST SERPL-CCNC: 28 U/L
B-GLOBULIN MFR SERPL ELPH: 10.1 %
B-GLOBULIN SERPL ELPH-MCNC: 0.6 G/DL
B2 MICROGLOB SERPL-MCNC: 1.5 MG/L
BILIRUB SERPL-MCNC: 1.5 MG/DL
BUN SERPL-MCNC: 12 MG/DL
CALCIUM SERPL-MCNC: 9.5 MG/DL
CHLORIDE SERPL-SCNC: 104 MMOL/L
CO2 SERPL-SCNC: 27 MMOL/L
CREAT SERPL-MCNC: 0.98 MG/DL
DEPRECATED KAPPA LC FREE/LAMBDA SER: 1.72 RATIO
EGFR: 88 ML/MIN/1.73M2
GAMMA GLOB FLD ELPH-MCNC: 0.5 G/DL
GAMMA GLOB MFR SERPL ELPH: 8.2 %
GLUCOSE SERPL-MCNC: 92 MG/DL
IGA SER QL IEP: 44 MG/DL
IGG SER QL IEP: 500 MG/DL
IGM SER QL IEP: 14 MG/DL
INTERPRETATION SERPL IEP-IMP: NORMAL
KAPPA LC CSF-MCNC: 0.29 MG/DL
KAPPA LC SERPL-MCNC: 0.5 MG/DL
LDH SERPL-CCNC: 205 U/L
M PROTEIN SPEC IFE-MCNC: NORMAL
POTASSIUM SERPL-SCNC: 4.5 MMOL/L
PROT SERPL-MCNC: 6.1 G/DL
SODIUM SERPL-SCNC: 142 MMOL/L

## 2024-02-20 ENCOUNTER — RX RENEWAL (OUTPATIENT)
Age: 59
End: 2024-02-20

## 2024-02-20 ENCOUNTER — NON-APPOINTMENT (OUTPATIENT)
Age: 59
End: 2024-02-20

## 2024-02-20 RX ORDER — VALSARTAN 40 MG/1
40 TABLET, COATED ORAL
Qty: 45 | Refills: 2 | Status: ACTIVE | COMMUNITY
Start: 2022-07-05 | End: 1900-01-01

## 2024-02-21 ENCOUNTER — RX RENEWAL (OUTPATIENT)
Age: 59
End: 2024-02-21

## 2024-02-21 ENCOUNTER — APPOINTMENT (OUTPATIENT)
Dept: ORTHOPEDIC SURGERY | Facility: CLINIC | Age: 59
End: 2024-02-21
Payer: COMMERCIAL

## 2024-02-21 DIAGNOSIS — S93.491A SPRAIN OF OTHER LIGAMENT OF RIGHT ANKLE, INITIAL ENCOUNTER: ICD-10-CM

## 2024-02-21 DIAGNOSIS — M25.571 PAIN IN RIGHT ANKLE AND JOINTS OF RIGHT FOOT: ICD-10-CM

## 2024-02-21 DIAGNOSIS — S46.011A STRAIN OF MUSCLE(S) AND TENDON(S) OF THE ROTATOR CUFF OF RIGHT SHOULDER, INITIAL ENCOUNTER: ICD-10-CM

## 2024-02-21 PROCEDURE — 73610 X-RAY EXAM OF ANKLE: CPT | Mod: RT

## 2024-02-21 PROCEDURE — 99203 OFFICE O/P NEW LOW 30 MIN: CPT

## 2024-02-21 NOTE — PHYSICAL EXAM
[de-identified] : Constitutional: Well-nourished, well-developed, No acute distress Respiratory:  Good respiratory effort, no SOB Lymphatic: No regional lymphadenopathy, no lymphedema Psychiatric: Pleasant and normal affect, alert and oriented x3 Musculoskeletal: normal except where as noted in regional exam  Right ankle: APPEARANCE: no swelling, no marked deformities or malalignment POSITIVE TENDERNESS: Mild at ATFL at its attachment on the lateral malleolus NONTENDER: medial malleolus, lateral malleolus, tibialis posterior tendon, achilles tendon, no marked thickening of tendon, CFL, PTFL, anterior tibiofibular ligament (high ankle), sinus tarsus, deltoid ligaments, 5th metatarsal.  RANGE OF MOTION: full & painless.  RESISTIVE TESTING: painless resisted dorsiflexion, plantar flexion, inversion & eversion.  SPECIAL TESTS: neg anterior drawer. neg talar tilt. neg Warner's  [de-identified] :  The following radiographs were ordered and read by me during this patient's visit. I reviewed each radiograph in detail with the patient and discussed the findings as highlighted below.   3 views of the right ankle were obtained today that show no fracture, or dislocation. There are no degenerative changes seen. There is no malalignment. No obvious osseous abnormality. Otherwise unremarkable.

## 2024-02-21 NOTE — HISTORY OF PRESENT ILLNESS
[de-identified] : Patient is a 58 yo male who presents for 2 months of right ankle pain. He fell down the stairs 2 months ago, but was able to brace himself with his upper body, and did not note injury to the ankle at that time. He was able to bear weight and did not notice any swelling or bruising. Over the next few weeks, he developed intermittent lateral ankle pain. He notes this pain as a throbbing feeling at the end of the day. He also sometimes feels extremely sharp pain in the area which lasts for 2 seconds, when he is in bed and turns his foot outwards. He is not doing anything to treat this. Denies prior injuries or trauma.

## 2024-02-21 NOTE — DISCUSSION/SUMMARY
[de-identified] : Discussed findings of today's exam and possible causes of patient's pain.  Educated patient on their most probable diagnosis of mild grade 1 right ankle sprain of the ATFL.  Reviewed possible courses of treatment, and we collaboratively decided best course of treatment at this time will include conservative management.  Patient injured his ankle approximately 2 months ago, he is only having very mild intermittent pain.  Only abnormal finding on clinical exam is some slight tenderness at the ATFL attachment on the lateral malleolus.  Otherwise, patient has full range of motion and full strength.  He is advised that he needs to work on some basic proprioception and single-leg balance training, recommend standing on 1 foot while performing simple ADLs at home to work on neuromuscular reeducation.  Patient has no evidence of any high-grade tear or rupture, no surgical indications, no need for MRI at this time.  Patient already has full range of motion full-strength, no specific need for physical therapy.  Follow up as needed.  Patient appreciates and agrees with current plan.    This note was generated using dragon medical dictation software.  A reasonable effort has been made for proofreading its contents, but typos may still remain.  If there are any questions or points of clarification needed please notify my office.

## 2024-03-04 ENCOUNTER — TRANSCRIPTION ENCOUNTER (OUTPATIENT)
Age: 59
End: 2024-03-04

## 2024-03-11 ENCOUNTER — RESULT REVIEW (OUTPATIENT)
Age: 59
End: 2024-03-11

## 2024-03-11 ENCOUNTER — LABORATORY RESULT (OUTPATIENT)
Age: 59
End: 2024-03-11

## 2024-03-11 ENCOUNTER — APPOINTMENT (OUTPATIENT)
Dept: HEMATOLOGY ONCOLOGY | Facility: CLINIC | Age: 59
End: 2024-03-11
Payer: COMMERCIAL

## 2024-03-11 VITALS
HEIGHT: 70.16 IN | OXYGEN SATURATION: 96 % | SYSTOLIC BLOOD PRESSURE: 121 MMHG | TEMPERATURE: 98 F | HEART RATE: 68 BPM | DIASTOLIC BLOOD PRESSURE: 78 MMHG | RESPIRATION RATE: 16 BRPM | WEIGHT: 196.21 LBS | BODY MASS INDEX: 28.09 KG/M2

## 2024-03-11 DIAGNOSIS — I73.81 ERYTHROMELALGIA: ICD-10-CM

## 2024-03-11 DIAGNOSIS — Z51.11 ENCOUNTER FOR ANTINEOPLASTIC CHEMOTHERAPY: ICD-10-CM

## 2024-03-11 LAB
BASOPHILS # BLD AUTO: 0 K/UL — SIGNIFICANT CHANGE UP (ref 0–0.2)
BASOPHILS NFR BLD AUTO: 0 % — SIGNIFICANT CHANGE UP (ref 0–2)
EOSINOPHIL # BLD AUTO: 0 K/UL — SIGNIFICANT CHANGE UP (ref 0–0.5)
EOSINOPHIL NFR BLD AUTO: 0 % — SIGNIFICANT CHANGE UP (ref 0–6)
ERYTHROCYTE [SEDIMENTATION RATE] IN BLOOD: 2 MM/HR — SIGNIFICANT CHANGE UP (ref 0–20)
HCT VFR BLD CALC: 40.6 % — SIGNIFICANT CHANGE UP (ref 39–50)
HGB BLD-MCNC: 14 G/DL — SIGNIFICANT CHANGE UP (ref 13–17)
IMM GRANULOCYTES NFR BLD AUTO: 0.2 % — SIGNIFICANT CHANGE UP (ref 0–0.9)
LYMPHOCYTES # BLD AUTO: 0.85 K/UL — LOW (ref 1–3.3)
LYMPHOCYTES # BLD AUTO: 16.3 % — SIGNIFICANT CHANGE UP (ref 13–44)
MCHC RBC-ENTMCNC: 34.5 G/DL — SIGNIFICANT CHANGE UP (ref 32–36)
MCHC RBC-ENTMCNC: 34.7 PG — HIGH (ref 27–34)
MCV RBC AUTO: 100.7 FL — HIGH (ref 80–100)
MONOCYTES # BLD AUTO: 0.82 K/UL — SIGNIFICANT CHANGE UP (ref 0–0.9)
MONOCYTES NFR BLD AUTO: 15.7 % — HIGH (ref 2–14)
NEUTROPHILS # BLD AUTO: 3.54 K/UL — SIGNIFICANT CHANGE UP (ref 1.8–7.4)
NEUTROPHILS NFR BLD AUTO: 67.8 % — SIGNIFICANT CHANGE UP (ref 43–77)
NRBC # BLD: 0 /100 WBCS — SIGNIFICANT CHANGE UP (ref 0–0)
PLATELET # BLD AUTO: 144 K/UL — LOW (ref 150–400)
RBC # BLD: 4.03 M/UL — LOW (ref 4.2–5.8)
RBC # FLD: 11.6 % — SIGNIFICANT CHANGE UP (ref 10.3–14.5)
WBC # BLD: 5.22 K/UL — SIGNIFICANT CHANGE UP (ref 3.8–10.5)
WBC # FLD AUTO: 5.22 K/UL — SIGNIFICANT CHANGE UP (ref 3.8–10.5)

## 2024-03-11 PROCEDURE — G2211 COMPLEX E/M VISIT ADD ON: CPT

## 2024-03-11 PROCEDURE — 99214 OFFICE O/P EST MOD 30 MIN: CPT

## 2024-03-12 PROBLEM — Z51.11 ENCOUNTER FOR CHEMOTHERAPY MANAGEMENT: Status: ACTIVE | Noted: 2022-08-15

## 2024-03-12 PROBLEM — I73.81 ERYTHROMELALGIA: Status: ACTIVE | Noted: 2024-03-12

## 2024-03-12 NOTE — RESULTS/DATA
[FreeTextEntry1] : =========================================================================\par   EXAM: [id number] - PETCT WB ONC FDG SUBS  - ORDERED BY: ASIF HARTMANN\par  \par  PROCEDURE DATE:  04/30/2022  \par   \par  INTERPRETATION:  PROCEDURE:  PET/CT WHOLE BODY (19105)\par  \par  CLINICAL INFORMATION: 57-year-old male diagnosed with multiple myeloma in \par  2019, status post therapy. PET/CT is done as part of subsequent treatment \par  strategy evaluation.\par  \par  RADIOPHARMACEUTICAL:  11.64 mCi F-18, FDG, I.V.\par  \par  TECHNIQUE:  Fasting blood sugar measured prior to injection of \par  radiopharmaceutical was 78 mg/dl. Following intravenous injection of the \par  radiopharmaceutical and an uptake period of approximately 60 minutes, \par  FDG-PET/CT imaging was performed on a Siemens Biograph mCT 64 PET-CT from \par  vertex of the skull to the toes. Oral contrast was given during the \par  uptake period. CT was performed during shallow respiration. The CT \par  protocol was optimized for PET attenuation correction and anatomic \par  localization. The CT protocol was not designed to produce and cannot \par  replace state-of-the-art diagnostic CT images with specific imaging \par  protocols for different body parts and indications. Images were reviewed \par  on a dedicated workstation using multiplanar reconstruction.\par  \par  The standardized uptake values (SUV) are normalized to patient body \par  weight and indicate the highest activity concentration (SUVmax) in a \par  given disease site. All image numbers refer to axial image number.\par  \par  COMPARISON:  No prior PET/CT is available for prior PET/CT..\par  \par  OTHER STUDIES USED FOR CORRELATION: MRI of lumbar spine from 2/7/2022 and \par  CT angiogram chest from 2/6/2022.\par  \par  FINDINGS:\par  \par  HEAD/NECK: Physiologic FDG activity in the brain, major salivary glands, \par  and neck muscles.\par  CHEST: No abnormal FDG activity. No lymphadenopathy.\par  LUNGS: No abnormal FDG activity. No nodule.\par  PLEURA/PERICARDIUM:Physiologic FDG activity. No pleural or pericardial \par  effusion.\par  \par  HEPATOBILIARY/PANCREAS: Physiologic FDG activity. Liver SUV mean is 2.7.\par  SPLEEN: Physiologic FDG activity. Normal in size.\par  ADRENAL GLANDS: No abnormal FDG activity. No pleural or pericardial \par  effusion.\par  KIDNEYS/URINARY BLADDER: Physiologic excreted FDG activity.\par  PELVIC ORGANS: No abnormal FDG activity.\par  ABDOMINOPELVIC NODES: No enlarged or FDG avid lymph nodes.\par  BOWEL/PERITONEUM/MESENTERY: No abnormal FDG activity.\par  BONES/SOFT TISSUES:Nonspecific heterogeneous FDG activity in the spinous \par  processes of L3 to L5 with no corresponding abnormalities on CT, and \par  associated overlying soft tissue stranding (SUV 9.7; image 251).\par  \par  IMPRESSION:  FDG-PET/CT scan demonstrates:\par  \par  Nonspecific heterogeneous FDG activity in the spinous processes of L3-L5 without CT correlate, with associated mildly FDG avid overlying soft tissue stranding. This may be secondary to post traumatic changes. Please correlate clinically.\par  \par  Direct comparison will be performed and an addendum issued when prior PET/CT is made available.\par  \par  --- End of Report ---\par  \par  BEN LOYA MD; Attending Nuclear Medicine \par  This document has been electronically signed. May  1 2022  2:57PM\par  \par  =========================================================================

## 2024-03-12 NOTE — ASSESSMENT
[FreeTextEntry1] : 60 yo male with ?Gilbert syndrome, high-risk IgA lambda Multiple Myeloma (Dx 2019) currently in a morphologic and serologic CR at last assessment here for further monitoring and management. He has been on 3 different treatment / maintenance regimens (including VRd, KRd, Venetoclax + Ninlaro) all in the front-line setting. 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 He had been off all maintenance therapy since 11/10/2021 but restarted again in June 2022.  During our first visit on 3/30/22 we ordered immunoelectrophoresis studies which did not detect a monoclonal protein or elevated FLC in either the serum or urine. A repeat PET-CT was performed on 4/30/22 which did not show any areas of disease. In addition we performed a BM biopsy and aspirate on 4/8/22 in which no morphologic, cytogenetic, or immunophenotypic features of plasma cell myeloma were identified thus showing continued CR. Clonoseq MRD testing was sent on the first 1.5 - 2 mL aspirate pull which showed the presence of a residual sequence below the limit of detection (">0-1 residual clonal cells per million nucleated cell"). Only 1 of the original 3 sequences was detected (IGH Sequence A). Sequences for IGK and IGL patient-specific mutations were not detected.  He continues to be co-managed with Dr Chapin at [place]. In June 2022, he was started back on maintenance Ninlaro and Venetoclax with the total goal of 5 years of therapy. Repeat clonoseq testing in April 26, 2023 again showed MRD to the level of >0-<1 / 1,000,000 cells. In addition, he will continue to undergo annual PET-CT and clonoseq MRD testing on bone marrow aspirate with the next testing planned in April 2024. Dr Chapin will continue to be involved in Mr [facility] and will see him next on May 30, 2024.  Plan: - Repeat myeloma panel (Pre-V) once every 12 weeks. - CBCs at every 4 weeks due to maintenance therapy - Continue Venetoclax and Ninlaro therapy as above, discussed results of most recent testing. - PET-CT and BM biopsy with clonoseq testing next due in April 2024 - During his last visit with Dr Chapin he discussed serial BM monitoring (peripheral blood has reduced sensitivity) with clonoseq MRD annually and peripheral blood clonoseq testing every 3 months if treatment discontinued. - Left 2nd and 3rd toe ?erythromelalgia: May be related to Ninlaro, he will monitor to see if flares reduce during his 1 week off Ninlaro each cycle. If improvement during off week can consider holding Ninlaro longer for resolution and possible dose adjustments. - Healthcare Maintenance: COVID-19 vaccinated, continue with booster doses per current CDC guidelines, Pneumonia vaccinated with Prevnar 20. Completed shingles vaccination. Annual derm visit recommended. - Prostate Ca: Kay score 6, currently under observation. - Send note, BM reports and PET-CT reports to Dr Chapin's Office fax [phone] - Follow up in 12 weeks  ___ I personally have spent a total of 30 minutes of time on the date of this encounter reviewing test results, documenting findings, providing education, coordinating care and directly consulting with the patient.

## 2024-03-12 NOTE — HISTORY OF PRESENT ILLNESS
[Disease:__________________________] : Disease: [unfilled] [Treatment Protocol] : Treatment Protocol [Date: ____________] : Patient's last distress assessment performed on [unfilled]. 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 Fib / A. Flutter (post-surgical, s/p cardioversion, not on AC), lumbar radiculopathy (s/p lumbar microdiscectomy 3/15/22) here for further management of his high-risk (TP53 gene deletion, del13q, t(11;14)) IgA Lambda multiple myeloma initially diagnosed in 2019. He presents here as a transfer of care related to change in insurance from [facility] system where he was most recently followed by Dr Mian Chapin.  He was diagnosed with IgA lambda multiple myeloma with anemia (no bone lesions - on skeletal survey, renal issues or hypercalcemia) in May 2019. He was started on Revlimid, Velcade and Dexamethasone (RVd) in June 2019 under the care of Dr Sharif Arteaga (oncologist associated with [facility]). He reports he developed eye styes / chalazia from Velcade and subsequently discontinued the medication. He may have had a brief time on Kyprolis with Rev and Dex (he believes it was started by Dr Chapin), however he developed severe back spasms on the Revlimid and this led to its discontinuation. He also had insomnia while taking steroids. He switched his care over to [place] in Oct 2019.  In January 2020 he had a repeat BM biopsy and was found to have a CR with undetectable M-spike and normalization of his FLC. During this time he was being seen by Dr Chapin at [place], who advised against autologous stem cell transplantation in first CR. He then underwent stem cell collection (stored at a cryobank at [facility]) and he was subsequently started on therapy with Ninlaro and Venetoclax maintenance (still in the front-line setting). 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 His maintenance therapy was discontinued Nov 10, 2021 and has yet to be restarted. His LVEF while on RVd in Oct 2019 was estimated at 69% ([facility]). His most recent LVEF checked at [facility] in Feb 2022 was reduced down to 39%. He has been following cardiology for optimization of his heart failure medications, for which he is taking metoprolol and losartan.  Since achieving a CR in 2020, imaging with PET-CT and Bone marrow biopsies up to this point have been negative for residual disease and lesions. His last BM biopsy was in June 2021 at [facility]. He did have clonoseq testing for the ID sample, but has yet to have any MRD measurements. He was previously on Ninlaro 3 mg once weekly for 3/4 weeks every 28 day cycle and venetoclax was taken at 400 mg PO 5/7 days a week of every 28 day cycle.  He reports that he had back surgery in March 2022. He was having back pain which is when they discovered he has herniated disks L4 and L5 and underwent microdiscectomy on 3/15/22. He has had a change in insurance to [facility] [employer], which is no longer covered at [facility] and he came to [facility] to discuss further management.  Today he overall feels well. He has not experienced any changes in his health since his back surgery. He is currently not on any myeloma-directed therapy. He denies any new bone pain, fevers, chills, night sweats, neuropathic pain, urinary / kidney issues.  ================================================================== Care Providers:  PMD: Dr Apolinar Ballard ([facility]) Cardiology: Dr Gurwinder Roblero ([facility]) Oncologist / Hematologist: Dr Mian Chapin ([facility]) Tel: [phone], Fax: [phone]    ================================================================== [de-identified] : ? [de-identified] : Outside pathology at [facility]:\par  BM biopsy and aspirate May 2019: \par  BM core - numerous plasma cells estimated up to 70%.\par  BM aspirate - 40% lambda restricted plasma cells\par  \par  FISH: t(11;14) CCND1-IGH gene in 45% of cells, deletion 13q14.3 region in 40% of cells and TP53 gene deletion in 28% of cells. [FreeTextEntry1] : Ninlaro 3 weeks on and 1 week off + Venetoclax 400 mg M-F (Sat and Sun off) continuously [de-identified] : 3/30/22: Initial visit.  5/25/22: Follow-up. He has been feeling well overall. He has joined the gym 3 weeks ago and has been more active. He recently went on a cruise without any limitations in his activities. He feels he has more energy lately. His next echocardiogram is in September. No new lumps or bumps.  Weight is stable and appetite is good. He denies any constitutional complaints. He has a planned virtual appointment with Dr. Mian Chapin on 6/21/22.  8/15/22: Follow-up. He is feeling well overall. He has been swimming often at either [place] or town pools. No issues with exercise tolerance. No chest pain, palpitations or shortness of breath.  He is seeing the cardiologist at the end of September. He takes Ninlaro on Wednesdays once a week for 3 weeks on and 1 week off. No side effects to the medications besides belching. No bleeding complications or bruising. No blood in the stool, no bleeding from gums. No new bone pains. No weight loss.   10/24/22: Follow-up. No complaints. Has gained some weight. No side effects from medications. No recent illnesses.  12/19/22: Follow-up. He continues to have chronic back pain related to degenerative changes.Otherwise no significant changes in health. No medication changes. Will start [employer] work again as an  this tax season. He continues to tolerate treatment well without any significant adverse effects or cytopenias.  3/15/23: Follow-up. He reports continued chronic back pain. No new bone pains. He also has fatigue. He has not experienced any constitutional issues. He has gained weight. No recent illnesses. His blood counts remain stable on therapy.  6/12/23: Follow-up. Has been experiencing generalized fatigue. No other issues. He has not noticed any side effects from therapy. Blood counts are stable / normal today.   9/11/23: Follow-up. Overall he is feeling well without any major complaints. He has a hang-over like effect the following day and headache with each dose of Ninlaro. He denies cough, SOB at rest or on exertion, claudication, abdominal complaints, no n/v/d. Denies fever/chills, night sweats, unexpected weight loss.  He denies experiencing new / consistent bone pains and has not noticed any new lumps or masses on his body.  12/18/23: Follow-up. Back in November he went a cruise and developed laryngitis likely viral as his wife also developed it. Last week he developed taste loss for 1.5 days. He now is starting to feel better, but he still feels tired. His night-time wheezing is resolved. He feels congested now and has the feeling of swollen glands.  3/11/24: Follow-up. Over the last 2 months he has been having intermittent flares of redness and swelling in his left 2nd and 3rd toes. It was thought to be Erythromelalgia after he saw a podiatrist who believed it was not related to my immunotherapy maintenance therapy but felt it is related in some way to his Myeloma Hx. He remains in a remission for his myeloma and repeat assessment in April 2024 including a PET-CT, immunoelectrophoresis studies and bone marrow biopsy with NGS-based MRD testing will hopefully confirm his continued remission on venetoclax and ninlaro. He has no other rashes or areas of swelling. No new bone pain or body aches. He overall feels well and continues to exercise multiple times a week without restrictions. He is now planning his MCFP somewhere in [place] and is thinking he will be moving in 2025.  A comprehensive review of systems was performed including constitutional, eyes, ENT, cardiovascular, respiratory, gastrointestinal, genitourinary, musculoskeletal, integumentary, neurological, psychiatric and hematologic / lymphatic. All pertinent positives are included in the H&P under interval history above and the remaining review of systems listed are negative.   ========================================================== Treatment Hx:   VRd - unspecified number of cycles, started June 2019 KRd? - unclear duration Ninlaro + Venetoclax (last dose 11/10/2021; was off for ~6 months due to cardiac surgery)  Maintenance with Ninlaro (weekly day 1,8,15) + Venetoclax (M-F) on 28-day cycles - Restarted June 2022  ==========================================================

## 2024-03-12 NOTE — CONSULT LETTER
[Dear  ___] : Dear ~ARELY, [Courtesy Letter:] : I had the pleasure of seeing your patient, [unfilled], in my office today. [Please see my note below.] : Please see my note below. [Referral Closing:] : Thank you very much for seeing this patient.  If you have any questions, please do not hesitate to contact me. [Sincerely,] : Sincerely, [FreeTextEntry3] : Pranav Hong DO, MSc\par  , [facility]\par  Attending Physician - Malignant Hematology\par  [facility]\par  [address] [address]\par  Tel: [phone]\par  Fax: [phone]

## 2024-03-13 ENCOUNTER — APPOINTMENT (OUTPATIENT)
Dept: INTERNAL MEDICINE | Facility: CLINIC | Age: 59
End: 2024-03-13
Payer: COMMERCIAL

## 2024-03-13 VITALS
OXYGEN SATURATION: 96 % | DIASTOLIC BLOOD PRESSURE: 78 MMHG | TEMPERATURE: 98.2 F | HEART RATE: 64 BPM | WEIGHT: 196.21 LBS | SYSTOLIC BLOOD PRESSURE: 116 MMHG | BODY MASS INDEX: 28.09 KG/M2 | RESPIRATION RATE: 16 BRPM | HEIGHT: 70.16 IN

## 2024-03-13 DIAGNOSIS — F41.9 ANXIETY DISORDER, UNSPECIFIED: ICD-10-CM

## 2024-03-13 DIAGNOSIS — E78.5 HYPERLIPIDEMIA, UNSPECIFIED: ICD-10-CM

## 2024-03-13 DIAGNOSIS — R82.90 UNSPECIFIED ABNORMAL FINDINGS IN URINE: ICD-10-CM

## 2024-03-13 PROCEDURE — G2211 COMPLEX E/M VISIT ADD ON: CPT

## 2024-03-13 PROCEDURE — 99214 OFFICE O/P EST MOD 30 MIN: CPT

## 2024-03-13 RX ORDER — AMOXICILLIN 500 MG/1
500 CAPSULE ORAL
Qty: 40 | Refills: 0 | Status: DISCONTINUED | COMMUNITY
Start: 2023-12-12

## 2024-03-13 RX ORDER — DESONIDE 0.5 MG/G
0.05 CREAM TOPICAL
Qty: 45 | Refills: 0 | Status: DISCONTINUED | COMMUNITY
Start: 2023-12-18

## 2024-03-13 RX ORDER — ERYTHROMYCIN 5 MG/G
5 OINTMENT OPHTHALMIC
Qty: 1 | Refills: 0 | Status: DISCONTINUED | COMMUNITY
Start: 2023-09-12 | End: 2024-03-13

## 2024-03-13 RX ORDER — ALPRAZOLAM 0.25 MG/1
0.25 TABLET ORAL DAILY
Qty: 30 | Refills: 0 | Status: ACTIVE | COMMUNITY
Start: 2024-03-13 | End: 1900-01-01

## 2024-03-13 NOTE — HISTORY OF PRESENT ILLNESS
[de-identified] : 59 year old M with PMH MM in remission and HLD, presents for Xanax refill request and a few additional labs are due. Pt denies CP/SOB, fever/chills, n/v/d/c.

## 2024-03-13 NOTE — PLAN
[FreeTextEntry1] : Labs ordered. Rx sent. Pt advised to sign up for [facility] portal to review labs and communicate any questions or concerns directly. Yearly physical and return as needed for illness, medication refills, and new or existing complaints.

## 2024-03-15 LAB
ALBUMIN MFR SERPL ELPH: 69.6 %
ALBUMIN SERPL ELPH-MCNC: 4.7 G/DL
ALBUMIN SERPL-MCNC: 4.2 G/DL
ALBUMIN/GLOB SERPL: 2.2 RATIO
ALP BLD-CCNC: 85 U/L
ALPHA1 GLOB MFR SERPL ELPH: 3.6 %
ALPHA1 GLOB SERPL ELPH-MCNC: 0.2 G/DL
ALPHA2 GLOB MFR SERPL ELPH: 9.4 %
ALPHA2 GLOB SERPL ELPH-MCNC: 0.6 G/DL
ALT SERPL-CCNC: 27 U/L
ANION GAP SERPL CALC-SCNC: 12 MMOL/L
AST SERPL-CCNC: 32 U/L
B-GLOBULIN MFR SERPL ELPH: 9.9 %
B-GLOBULIN SERPL ELPH-MCNC: 0.6 G/DL
B2 MICROGLOB SERPL-MCNC: 1.5 MG/L
BILIRUB SERPL-MCNC: 1.8 MG/DL
BUN SERPL-MCNC: 17 MG/DL
CALCIUM SERPL-MCNC: 9.2 MG/DL
CHLORIDE SERPL-SCNC: 105 MMOL/L
CO2 SERPL-SCNC: 24 MMOL/L
CREAT SERPL-MCNC: 0.87 MG/DL
CRP SERPL-MCNC: <3 MG/L
DEPRECATED KAPPA LC FREE/LAMBDA SER: 1.32 RATIO
EGFR: 99 ML/MIN/1.73M2
GAMMA GLOB FLD ELPH-MCNC: 0.5 G/DL
GAMMA GLOB MFR SERPL ELPH: 7.5 %
GLUCOSE SERPL-MCNC: 104 MG/DL
IGA SER QL IEP: 53 MG/DL
IGG SER QL IEP: 551 MG/DL
IGM SER QL IEP: 15 MG/DL
INTERPRETATION SERPL IEP-IMP: NORMAL
KAPPA LC CSF-MCNC: 0.31 MG/DL
KAPPA LC SERPL-MCNC: 0.41 MG/DL
LDH SERPL-CCNC: 215 U/L
M PROTEIN SPEC IFE-MCNC: NORMAL
POTASSIUM SERPL-SCNC: 4.1 MMOL/L
PROT SERPL-MCNC: 6.1 G/DL
SODIUM SERPL-SCNC: 141 MMOL/L
URATE SERPL-MCNC: 4.2 MG/DL

## 2024-03-31 ENCOUNTER — TRANSCRIPTION ENCOUNTER (OUTPATIENT)
Age: 59
End: 2024-03-31

## 2024-04-03 ENCOUNTER — OUTPATIENT (OUTPATIENT)
Dept: OUTPATIENT SERVICES | Facility: HOSPITAL | Age: 59
LOS: 1 days | Discharge: HOME | End: 2024-04-03

## 2024-04-03 DIAGNOSIS — C90.00 MULTIPLE MYELOMA NOT HAVING ACHIEVED REMISSION: ICD-10-CM

## 2024-04-10 ENCOUNTER — RESULT REVIEW (OUTPATIENT)
Age: 59
End: 2024-04-10

## 2024-04-10 ENCOUNTER — LABORATORY RESULT (OUTPATIENT)
Age: 59
End: 2024-04-10

## 2024-04-10 ENCOUNTER — APPOINTMENT (OUTPATIENT)
Dept: HEMATOLOGY ONCOLOGY | Facility: CLINIC | Age: 59
End: 2024-04-10
Payer: COMMERCIAL

## 2024-04-10 VITALS
TEMPERATURE: 98 F | SYSTOLIC BLOOD PRESSURE: 113 MMHG | HEART RATE: 54 BPM | WEIGHT: 197.31 LBS | HEIGHT: 70.16 IN | BODY MASS INDEX: 28.25 KG/M2 | OXYGEN SATURATION: 98 % | RESPIRATION RATE: 16 BRPM | DIASTOLIC BLOOD PRESSURE: 76 MMHG

## 2024-04-10 LAB
BASOPHILS # BLD AUTO: 0.01 K/UL — SIGNIFICANT CHANGE UP (ref 0–0.2)
BASOPHILS NFR BLD AUTO: 0.2 % — SIGNIFICANT CHANGE UP (ref 0–2)
EOSINOPHIL # BLD AUTO: 0.01 K/UL — SIGNIFICANT CHANGE UP (ref 0–0.5)
EOSINOPHIL NFR BLD AUTO: 0.2 % — SIGNIFICANT CHANGE UP (ref 0–6)
HCT VFR BLD CALC: 40.5 % — SIGNIFICANT CHANGE UP (ref 39–50)
HGB BLD-MCNC: 14.2 G/DL — SIGNIFICANT CHANGE UP (ref 13–17)
IMM GRANULOCYTES NFR BLD AUTO: 0.5 % — SIGNIFICANT CHANGE UP (ref 0–0.9)
LYMPHOCYTES # BLD AUTO: 0.8 K/UL — LOW (ref 1–3.3)
LYMPHOCYTES # BLD AUTO: 13.8 % — SIGNIFICANT CHANGE UP (ref 13–44)
MCHC RBC-ENTMCNC: 35 PG — HIGH (ref 27–34)
MCHC RBC-ENTMCNC: 35.1 G/DL — SIGNIFICANT CHANGE UP (ref 32–36)
MCV RBC AUTO: 99.8 FL — SIGNIFICANT CHANGE UP (ref 80–100)
MONOCYTES # BLD AUTO: 0.66 K/UL — SIGNIFICANT CHANGE UP (ref 0–0.9)
MONOCYTES NFR BLD AUTO: 11.4 % — SIGNIFICANT CHANGE UP (ref 2–14)
NEUTROPHILS # BLD AUTO: 4.28 K/UL — SIGNIFICANT CHANGE UP (ref 1.8–7.4)
NEUTROPHILS NFR BLD AUTO: 73.9 % — SIGNIFICANT CHANGE UP (ref 43–77)
NRBC # BLD: 0 /100 WBCS — SIGNIFICANT CHANGE UP (ref 0–0)
PLATELET # BLD AUTO: 188 K/UL — SIGNIFICANT CHANGE UP (ref 150–400)
RBC # BLD: 4.06 M/UL — LOW (ref 4.2–5.8)
RBC # FLD: 11.4 % — SIGNIFICANT CHANGE UP (ref 10.3–14.5)
WBC # BLD: 5.79 K/UL — SIGNIFICANT CHANGE UP (ref 3.8–10.5)
WBC # FLD AUTO: 5.79 K/UL — SIGNIFICANT CHANGE UP (ref 3.8–10.5)

## 2024-04-10 PROCEDURE — 38221 DX BONE MARROW BIOPSIES: CPT | Mod: RT

## 2024-04-10 NOTE — PROCEDURE
[Bone Marrow Biopsy] : bone marrow biopsy [Bone Marrow Aspiration] : bone marrow aspiration  [Patient] : the patient [Verbal Consent Obtained] : verbal consent was obtained prior to the procedure [Patient identification verified] : patient identification verified [Procedure verified and consent obtained] : procedure verified and consent obtained [Laterality verified and correct site marked] : laterality verified and correct site marked [Right] : site: right [Correct positioning] : correct positioning [Prone] : prone [Superior iliac spine was identified] : the superior iliac spine was identified. [The right posterior iliac crest was prepped with betadine and draped, using sterile technique.] : The right posterior iliac crest was prepped with betadine and draped, using sterile technique. [Lidocaine was injected and into the periosteum overlying the site.] : Lidocaine was injected and into the periosteum overlying the site. [Aspirate] : aspirate [Cytogenetics] : cytogenetics [FISH] : FISH [Biopsy] : biopsy [Flow Cytometry] : flow cytometry [] : The patient was instructed to remove the bandage the following AM. The patient may bathe. Acetaminophen may be taken for discomfort, as per package directions.If there are any other problems, the patient was instructed to call the office. The patient verbalized understanding, and is aware of the office contact numbers. [FreeTextEntry1] : Multiple Myeloma s/p 5yrs of ninlaro and venetoclax. Re-assess disease [FreeTextEntry2] : 10cc of 1% lidocaine was injected into the R PIC site.  WBC: 5.79 Hgb: 14.2 Hct: 40.5 Plts: 188  Bone marrow aspiration and biopsy were done. Multiple myeloma panel requested. [employer] tracking #: [id number] On ASA, extra pressure applied, >20min, post-procedure.

## 2024-04-10 NOTE — REASON FOR VISIT
[Bone Marrow Biopsy] : bone marrow biopsy [Bone Marrow Aspiration] : bone marrow aspiration [FreeTextEntry2] : Multiple Myeloma s/p 5yrs of ninlaro and venetoclax. Re-assess disease

## 2024-04-12 ENCOUNTER — NON-APPOINTMENT (OUTPATIENT)
Age: 59
End: 2024-04-12

## 2024-04-15 ENCOUNTER — NON-APPOINTMENT (OUTPATIENT)
Age: 59
End: 2024-04-15

## 2024-04-29 ENCOUNTER — APPOINTMENT (OUTPATIENT)
Dept: NUCLEAR MEDICINE | Facility: IMAGING CENTER | Age: 59
End: 2024-04-29
Payer: COMMERCIAL

## 2024-04-29 ENCOUNTER — OUTPATIENT (OUTPATIENT)
Dept: OUTPATIENT SERVICES | Facility: HOSPITAL | Age: 59
LOS: 1 days | End: 2024-04-29
Payer: COMMERCIAL

## 2024-04-29 DIAGNOSIS — C90.01 MULTIPLE MYELOMA IN REMISSION: ICD-10-CM

## 2024-04-29 PROCEDURE — 78816 PET IMAGE W/CT FULL BODY: CPT

## 2024-04-29 PROCEDURE — 78816 PET IMAGE W/CT FULL BODY: CPT | Mod: 26,PS

## 2024-04-29 PROCEDURE — A9552: CPT

## 2024-05-14 ENCOUNTER — RESULT REVIEW (OUTPATIENT)
Age: 59
End: 2024-05-14

## 2024-05-14 ENCOUNTER — APPOINTMENT (OUTPATIENT)
Dept: HEMATOLOGY ONCOLOGY | Facility: CLINIC | Age: 59
End: 2024-05-14

## 2024-05-14 ENCOUNTER — TRANSCRIPTION ENCOUNTER (OUTPATIENT)
Age: 59
End: 2024-05-14

## 2024-05-14 LAB
BASOPHILS # BLD AUTO: 0.01 K/UL — SIGNIFICANT CHANGE UP (ref 0–0.2)
BASOPHILS NFR BLD AUTO: 0.2 % — SIGNIFICANT CHANGE UP (ref 0–2)
EOSINOPHIL # BLD AUTO: 0 K/UL — SIGNIFICANT CHANGE UP (ref 0–0.5)
EOSINOPHIL NFR BLD AUTO: 0 % — SIGNIFICANT CHANGE UP (ref 0–6)
HCT VFR BLD CALC: 42.5 % — SIGNIFICANT CHANGE UP (ref 39–50)
HGB BLD-MCNC: 14.4 G/DL — SIGNIFICANT CHANGE UP (ref 13–17)
IMM GRANULOCYTES NFR BLD AUTO: 0.4 % — SIGNIFICANT CHANGE UP (ref 0–0.9)
LYMPHOCYTES # BLD AUTO: 0.76 K/UL — LOW (ref 1–3.3)
LYMPHOCYTES # BLD AUTO: 16.8 % — SIGNIFICANT CHANGE UP (ref 13–44)
MCHC RBC-ENTMCNC: 33.9 G/DL — SIGNIFICANT CHANGE UP (ref 32–36)
MCHC RBC-ENTMCNC: 35 PG — HIGH (ref 27–34)
MCV RBC AUTO: 103.2 FL — HIGH (ref 80–100)
MONOCYTES # BLD AUTO: 0.53 K/UL — SIGNIFICANT CHANGE UP (ref 0–0.9)
MONOCYTES NFR BLD AUTO: 11.7 % — SIGNIFICANT CHANGE UP (ref 2–14)
NEUTROPHILS # BLD AUTO: 3.2 K/UL — SIGNIFICANT CHANGE UP (ref 1.8–7.4)
NEUTROPHILS NFR BLD AUTO: 70.9 % — SIGNIFICANT CHANGE UP (ref 43–77)
NRBC # BLD: 0 /100 WBCS — SIGNIFICANT CHANGE UP (ref 0–0)
PLATELET # BLD AUTO: 157 K/UL — SIGNIFICANT CHANGE UP (ref 150–400)
RBC # BLD: 4.12 M/UL — LOW (ref 4.2–5.8)
RBC # FLD: 11.3 % — SIGNIFICANT CHANGE UP (ref 10.3–14.5)
WBC # BLD: 4.52 K/UL — SIGNIFICANT CHANGE UP (ref 3.8–10.5)
WBC # FLD AUTO: 4.52 K/UL — SIGNIFICANT CHANGE UP (ref 3.8–10.5)

## 2024-05-15 ENCOUNTER — RX RENEWAL (OUTPATIENT)
Age: 59
End: 2024-05-15

## 2024-05-15 RX ORDER — ATORVASTATIN CALCIUM 40 MG/1
40 TABLET, FILM COATED ORAL
Qty: 90 | Refills: 1 | Status: ACTIVE | COMMUNITY
Start: 2022-12-12 | End: 1900-01-01

## 2024-05-16 LAB
ALBUMIN SERPL ELPH-MCNC: 4.9 G/DL
ALP BLD-CCNC: 99 U/L
ALT SERPL-CCNC: 31 U/L
ANION GAP SERPL CALC-SCNC: 9 MMOL/L
AST SERPL-CCNC: 32 U/L
B2 MICROGLOB SERPL-MCNC: 1.4 MG/L
BILIRUB SERPL-MCNC: 2.5 MG/DL
BUN SERPL-MCNC: 15 MG/DL
CALCIUM SERPL-MCNC: 9.7 MG/DL
CHLORIDE SERPL-SCNC: 103 MMOL/L
CO2 SERPL-SCNC: 28 MMOL/L
CREAT SERPL-MCNC: 1.12 MG/DL
EGFR: 75 ML/MIN/1.73M2
GLUCOSE SERPL-MCNC: 95 MG/DL
POTASSIUM SERPL-SCNC: 5 MMOL/L
PROT SERPL-MCNC: 6.7 G/DL
SODIUM SERPL-SCNC: 139 MMOL/L

## 2024-05-17 LAB
ALBUMIN MFR SERPL ELPH: 68.6 %
ALBUMIN SERPL-MCNC: 4.6 G/DL
ALBUMIN/GLOB SERPL: 2.2 RATIO
ALPHA1 GLOB MFR SERPL ELPH: 3.7 %
ALPHA1 GLOB SERPL ELPH-MCNC: 0.2 G/DL
ALPHA2 GLOB MFR SERPL ELPH: 9.5 %
ALPHA2 GLOB SERPL ELPH-MCNC: 0.6 G/DL
B-GLOBULIN MFR SERPL ELPH: 10.3 %
B-GLOBULIN SERPL ELPH-MCNC: 0.7 G/DL
DEPRECATED KAPPA LC FREE/LAMBDA SER: 1.39 RATIO
GAMMA GLOB FLD ELPH-MCNC: 0.5 G/DL
GAMMA GLOB MFR SERPL ELPH: 7.9 %
IGA SER QL IEP: 53 MG/DL
IGG SER QL IEP: 562 MG/DL
IGM SER QL IEP: 18 MG/DL
INTERPRETATION SERPL IEP-IMP: NORMAL
KAPPA LC CSF-MCNC: 0.28 MG/DL
KAPPA LC SERPL-MCNC: 0.39 MG/DL
M PROTEIN SPEC IFE-MCNC: NORMAL
PROT SERPL-MCNC: 6.7 G/DL
PROT SERPL-MCNC: 6.7 G/DL

## 2024-05-19 ENCOUNTER — RX RENEWAL (OUTPATIENT)
Age: 59
End: 2024-05-19

## 2024-05-24 ENCOUNTER — TRANSCRIPTION ENCOUNTER (OUTPATIENT)
Age: 59
End: 2024-05-24

## 2024-05-24 LAB
APPEARANCE: CLEAR
BACTERIA: NEGATIVE /HPF
BILIRUBIN URINE: NEGATIVE
BLOOD URINE: NEGATIVE
CAST: 0 /LPF
CHOLEST SERPL-MCNC: 156 MG/DL
COLOR: YELLOW
EPITHELIAL CELLS: 0 /HPF
GLUCOSE QUALITATIVE U: NEGATIVE MG/DL
HDLC SERPL-MCNC: 52 MG/DL
KETONES URINE: NEGATIVE MG/DL
LDLC SERPL CALC-MCNC: 85 MG/DL
LEUKOCYTE ESTERASE URINE: NEGATIVE
MICROSCOPIC-UA: NORMAL
NITRITE URINE: NEGATIVE
NONHDLC SERPL-MCNC: 104 MG/DL
PH URINE: 6.5
PROTEIN URINE: NEGATIVE MG/DL
RED BLOOD CELLS URINE: 1 /HPF
SPECIFIC GRAVITY URINE: 1.02
TRIGL SERPL-MCNC: 106 MG/DL
UROBILINOGEN URINE: 0.2 MG/DL
WHITE BLOOD CELLS URINE: 0 /HPF

## 2024-05-29 RX ORDER — METOPROLOL SUCCINATE 100 MG/1
100 TABLET, EXTENDED RELEASE ORAL DAILY
Qty: 45 | Refills: 1 | Status: ACTIVE | COMMUNITY
Start: 2023-01-03

## 2024-06-19 ENCOUNTER — TRANSCRIPTION ENCOUNTER (OUTPATIENT)
Age: 59
End: 2024-06-19

## 2024-06-20 ENCOUNTER — APPOINTMENT (OUTPATIENT)
Dept: INTERNAL MEDICINE | Facility: CLINIC | Age: 59
End: 2024-06-20
Payer: COMMERCIAL

## 2024-06-20 VITALS
DIASTOLIC BLOOD PRESSURE: 85 MMHG | SYSTOLIC BLOOD PRESSURE: 122 MMHG | WEIGHT: 194 LBS | HEIGHT: 70.16 IN | HEART RATE: 61 BPM | OXYGEN SATURATION: 98 % | BODY MASS INDEX: 27.77 KG/M2 | TEMPERATURE: 97.6 F

## 2024-06-20 DIAGNOSIS — R42 DIZZINESS AND GIDDINESS: ICD-10-CM

## 2024-06-20 DIAGNOSIS — C90.01 MULTIPLE MYELOMA IN REMISSION: ICD-10-CM

## 2024-06-20 DIAGNOSIS — D75.89 OTHER SPECIFIED DISEASES OF BLOOD AND BLOOD-FORMING ORGANS: ICD-10-CM

## 2024-06-20 PROCEDURE — G2211 COMPLEX E/M VISIT ADD ON: CPT

## 2024-06-20 PROCEDURE — 93000 ELECTROCARDIOGRAM COMPLETE: CPT

## 2024-06-20 PROCEDURE — 99214 OFFICE O/P EST MOD 30 MIN: CPT

## 2024-06-20 PROCEDURE — 36415 COLL VENOUS BLD VENIPUNCTURE: CPT

## 2024-06-20 RX ORDER — ONDANSETRON 8 MG/1
8 TABLET ORAL
Qty: 14 | Refills: 2 | Status: DISCONTINUED | COMMUNITY
Start: 2022-08-22 | End: 2024-06-20

## 2024-06-20 RX ORDER — IXAZOMIB 3 MG/1
3 CAPSULE ORAL
Qty: 3 | Refills: 2 | Status: DISCONTINUED | COMMUNITY
Start: 2022-06-22 | End: 2024-06-20

## 2024-06-20 RX ORDER — VENETOCLAX 100 MG/1
100 TABLET, FILM COATED ORAL
Qty: 104 | Refills: 2 | Status: DISCONTINUED | COMMUNITY
Start: 2022-07-06 | End: 2024-06-20

## 2024-06-20 RX ORDER — ACYCLOVIR 400 MG/1
400 TABLET ORAL TWICE DAILY
Qty: 60 | Refills: 3 | Status: DISCONTINUED | COMMUNITY
End: 2024-06-20

## 2024-06-20 RX ORDER — MECLIZINE HYDROCHLORIDE 25 MG/1
25 TABLET ORAL 3 TIMES DAILY
Qty: 21 | Refills: 0 | Status: ACTIVE | COMMUNITY
Start: 2024-06-20 | End: 1900-01-01

## 2024-06-20 NOTE — REVIEW OF SYSTEMS
[Headache] : no headache [Dizziness] : dizziness [Fainting] : no fainting [Confusion] : no confusion [Unsteady Walk] : ataxia [Memory Loss] : no memory loss [Negative] : Heme/Lymph

## 2024-06-20 NOTE — PHYSICAL EXAM
[No Acute Distress] : no acute distress [Well-Appearing] : well-appearing [Normal Outer Ear/Nose] : the outer ears and nose were normal in appearance [Normal TMs] : both tympanic membranes were normal [No Respiratory Distress] : no respiratory distress  [No Accessory Muscle Use] : no accessory muscle use [Clear to Auscultation] : lungs were clear to auscultation bilaterally [Normal Rate] : normal rate  [Regular Rhythm] : with a regular rhythm [Normal S1, S2] : normal S1 and S2 [No Murmur] : no murmur heard [Coordination Grossly Intact] : coordination grossly intact [No Focal Deficits] : no focal deficits [Normal Gait] : normal gait [Normal Affect] : the affect was normal [Normal Insight/Judgement] : insight and judgment were intact

## 2024-06-20 NOTE — HISTORY OF PRESENT ILLNESS
[FreeTextEntry8] : 59 year old M with PMH MM in remission and HLD, presents for dizziness. Pt denies CP/SOB, fever/chills, n/v/d/c.

## 2024-06-20 NOTE — PLAN
[FreeTextEntry1] : Plan as above. Rx sent. Check blood work today in office draw. ECG today. Refer to ENT. f/u with cardiology. Pt advised to sign up for [facility] portal to review labs and communicate any questions or concerns directly. Yearly physical and return as needed for illness, medication refills, and new or existing complaints.

## 2024-06-21 ENCOUNTER — TRANSCRIPTION ENCOUNTER (OUTPATIENT)
Age: 59
End: 2024-06-21

## 2024-06-21 LAB
ALBUMIN SERPL ELPH-MCNC: 4.8 G/DL
ALP BLD-CCNC: 94 U/L
ALT SERPL-CCNC: 30 U/L
ANION GAP SERPL CALC-SCNC: 12 MMOL/L
APPEARANCE: CLEAR
AST SERPL-CCNC: 31 U/L
BACTERIA: NEGATIVE /HPF
BASOPHILS # BLD AUTO: 0.01 K/UL
BASOPHILS NFR BLD AUTO: 0.2 %
BILIRUB SERPL-MCNC: 2 MG/DL
BILIRUBIN URINE: NEGATIVE
BLOOD URINE: NEGATIVE
BUN SERPL-MCNC: 12 MG/DL
CALCIUM SERPL-MCNC: 9.7 MG/DL
CAST: 1 /LPF
CHLORIDE SERPL-SCNC: 102 MMOL/L
CO2 SERPL-SCNC: 26 MMOL/L
COLOR: YELLOW
CREAT SERPL-MCNC: 0.97 MG/DL
EGFR: 89 ML/MIN/1.73M2
EOSINOPHIL # BLD AUTO: 0.07 K/UL
EOSINOPHIL NFR BLD AUTO: 1.1 %
EPITHELIAL CELLS: 0 /HPF
FOLATE SERPL-MCNC: 10.5 NG/ML
GLUCOSE QUALITATIVE U: NEGATIVE MG/DL
GLUCOSE SERPL-MCNC: 89 MG/DL
HCT VFR BLD CALC: 43.1 %
HGB BLD-MCNC: 14.9 G/DL
IMM GRANULOCYTES NFR BLD AUTO: 0.3 %
KETONES URINE: NEGATIVE MG/DL
LEUKOCYTE ESTERASE URINE: NEGATIVE
LYMPHOCYTES # BLD AUTO: 1.09 K/UL
LYMPHOCYTES NFR BLD AUTO: 17 %
MAN DIFF?: NORMAL
MCHC RBC-ENTMCNC: 34.6 GM/DL
MCHC RBC-ENTMCNC: 34.9 PG
MCV RBC AUTO: 100.9 FL
MICROSCOPIC-UA: NORMAL
MONOCYTES # BLD AUTO: 0.63 K/UL
MONOCYTES NFR BLD AUTO: 9.8 %
NEUTROPHILS # BLD AUTO: 4.6 K/UL
NEUTROPHILS NFR BLD AUTO: 71.6 %
NITRITE URINE: NEGATIVE
PH URINE: 6
PLATELET # BLD AUTO: 219 K/UL
POTASSIUM SERPL-SCNC: 4.7 MMOL/L
PROT SERPL-MCNC: 6.6 G/DL
PROTEIN URINE: NEGATIVE MG/DL
RBC # BLD: 4.27 M/UL
RBC # FLD: 11.9 %
RED BLOOD CELLS URINE: 1 /HPF
REVIEW: NORMAL
SODIUM SERPL-SCNC: 140 MMOL/L
SPECIFIC GRAVITY URINE: 1.01
SPERM-LIKE CELLS: PRESENT
T4 FREE SERPL-MCNC: 1.6 NG/DL
TSH SERPL-ACNC: 2.1 UIU/ML
UROBILINOGEN URINE: 0.2 MG/DL
VIT B12 SERPL-MCNC: 453 PG/ML
WBC # FLD AUTO: 6.42 K/UL
WHITE BLOOD CELLS URINE: 2 /HPF

## 2024-06-27 ENCOUNTER — TRANSCRIPTION ENCOUNTER (OUTPATIENT)
Age: 59
End: 2024-06-27

## 2024-07-12 ENCOUNTER — OUTPATIENT (OUTPATIENT)
Dept: OUTPATIENT SERVICES | Facility: HOSPITAL | Age: 59
LOS: 1 days | Discharge: HOME | End: 2024-07-12

## 2024-07-12 DIAGNOSIS — C90.00 MULTIPLE MYELOMA NOT HAVING ACHIEVED REMISSION: ICD-10-CM

## 2024-07-15 ENCOUNTER — RESULT REVIEW (OUTPATIENT)
Age: 59
End: 2024-07-15

## 2024-07-15 ENCOUNTER — TRANSCRIPTION ENCOUNTER (OUTPATIENT)
Age: 59
End: 2024-07-15

## 2024-07-15 ENCOUNTER — APPOINTMENT (OUTPATIENT)
Dept: OTOLARYNGOLOGY | Facility: CLINIC | Age: 59
End: 2024-07-15
Payer: COMMERCIAL

## 2024-07-15 ENCOUNTER — APPOINTMENT (OUTPATIENT)
Dept: HEMATOLOGY ONCOLOGY | Facility: CLINIC | Age: 59
End: 2024-07-15
Payer: COMMERCIAL

## 2024-07-15 ENCOUNTER — NON-APPOINTMENT (OUTPATIENT)
Age: 59
End: 2024-07-15

## 2024-07-15 VITALS
BODY MASS INDEX: 27.27 KG/M2 | SYSTOLIC BLOOD PRESSURE: 120 MMHG | RESPIRATION RATE: 16 BRPM | WEIGHT: 190.92 LBS | DIASTOLIC BLOOD PRESSURE: 83 MMHG | TEMPERATURE: 98 F | HEART RATE: 57 BPM | OXYGEN SATURATION: 97 %

## 2024-07-15 VITALS
HEIGHT: 70 IN | HEART RATE: 53 BPM | SYSTOLIC BLOOD PRESSURE: 124 MMHG | DIASTOLIC BLOOD PRESSURE: 83 MMHG | WEIGHT: 192.13 LBS | BODY MASS INDEX: 27.51 KG/M2

## 2024-07-15 DIAGNOSIS — H93.12 TINNITUS, LEFT EAR: ICD-10-CM

## 2024-07-15 DIAGNOSIS — J31.0 CHRONIC RHINITIS: ICD-10-CM

## 2024-07-15 DIAGNOSIS — H90.3 SENSORINEURAL HEARING LOSS, BILATERAL: ICD-10-CM

## 2024-07-15 DIAGNOSIS — J34.2 DEVIATED NASAL SEPTUM: ICD-10-CM

## 2024-07-15 DIAGNOSIS — R42 DIZZINESS AND GIDDINESS: ICD-10-CM

## 2024-07-15 PROBLEM — R55 SYNCOPE, NEAR: Status: ACTIVE | Noted: 2024-07-15

## 2024-07-15 LAB
BASOPHILS # BLD AUTO: 0.01 K/UL — SIGNIFICANT CHANGE UP (ref 0–0.2)
BASOPHILS NFR BLD AUTO: 0.2 % — SIGNIFICANT CHANGE UP (ref 0–2)
EOSINOPHIL # BLD AUTO: 0.02 K/UL — SIGNIFICANT CHANGE UP (ref 0–0.5)
EOSINOPHIL NFR BLD AUTO: 0.3 % — SIGNIFICANT CHANGE UP (ref 0–6)
HCT VFR BLD CALC: 41.2 % — SIGNIFICANT CHANGE UP (ref 39–50)
HGB BLD-MCNC: 14.2 G/DL — SIGNIFICANT CHANGE UP (ref 13–17)
IMM GRANULOCYTES NFR BLD AUTO: 0.2 % — SIGNIFICANT CHANGE UP (ref 0–0.9)
LYMPHOCYTES # BLD AUTO: 0.92 K/UL — LOW (ref 1–3.3)
LYMPHOCYTES # BLD AUTO: 14 % — SIGNIFICANT CHANGE UP (ref 13–44)
MCHC RBC-ENTMCNC: 34.5 G/DL — SIGNIFICANT CHANGE UP (ref 32–36)
MCHC RBC-ENTMCNC: 35.1 PG — HIGH (ref 27–34)
MCV RBC AUTO: 102 FL — HIGH (ref 80–100)
MONOCYTES # BLD AUTO: 0.61 K/UL — SIGNIFICANT CHANGE UP (ref 0–0.9)
MONOCYTES NFR BLD AUTO: 9.3 % — SIGNIFICANT CHANGE UP (ref 2–14)
NEUTROPHILS # BLD AUTO: 4.98 K/UL — SIGNIFICANT CHANGE UP (ref 1.8–7.4)
NEUTROPHILS NFR BLD AUTO: 76 % — SIGNIFICANT CHANGE UP (ref 43–77)
NRBC # BLD: 0 /100 WBCS — SIGNIFICANT CHANGE UP (ref 0–0)
PLATELET # BLD AUTO: 160 K/UL — SIGNIFICANT CHANGE UP (ref 150–400)
RBC # BLD: 4.04 M/UL — LOW (ref 4.2–5.8)
RBC # FLD: 11.5 % — SIGNIFICANT CHANGE UP (ref 10.3–14.5)
WBC # BLD: 6.55 K/UL — SIGNIFICANT CHANGE UP (ref 3.8–10.5)
WBC # FLD AUTO: 6.55 K/UL — SIGNIFICANT CHANGE UP (ref 3.8–10.5)

## 2024-07-15 PROCEDURE — 92570 ACOUSTIC IMMITANCE TESTING: CPT

## 2024-07-15 PROCEDURE — G2211 COMPLEX E/M VISIT ADD ON: CPT

## 2024-07-15 PROCEDURE — 92557 COMPREHENSIVE HEARING TEST: CPT

## 2024-07-15 PROCEDURE — 99215 OFFICE O/P EST HI 40 MIN: CPT

## 2024-07-15 PROCEDURE — 93660 TILT TABLE EVALUATION: CPT

## 2024-07-15 PROCEDURE — 99244 OFF/OP CNSLTJ NEW/EST MOD 40: CPT | Mod: 25

## 2024-07-16 ENCOUNTER — APPOINTMENT (OUTPATIENT)
Dept: UROLOGY | Facility: CLINIC | Age: 59
End: 2024-07-16

## 2024-07-16 ENCOUNTER — APPOINTMENT (OUTPATIENT)
Dept: CARDIOLOGY | Facility: CLINIC | Age: 59
End: 2024-07-16
Payer: COMMERCIAL

## 2024-07-16 LAB
ALBUMIN SERPL ELPH-MCNC: 4.7 G/DL
ALP BLD-CCNC: 93 U/L
ALT SERPL-CCNC: 27 U/L
ANION GAP SERPL CALC-SCNC: 11 MMOL/L
AST SERPL-CCNC: 35 U/L
B2 MICROGLOB SERPL-MCNC: 1.3 MG/L
BILIRUB SERPL-MCNC: 2.1 MG/DL
BUN SERPL-MCNC: 16 MG/DL
CALCIUM SERPL-MCNC: 9.5 MG/DL
CHLORIDE SERPL-SCNC: 105 MMOL/L
CO2 SERPL-SCNC: 23 MMOL/L
EGFR: 90 ML/MIN/1.73M2
LDH SERPL-CCNC: 226 U/L
POTASSIUM SERPL-SCNC: 4.5 MMOL/L
PROT SERPL-MCNC: 6.5 G/DL
SODIUM SERPL-SCNC: 139 MMOL/L

## 2024-07-16 PROCEDURE — 93306 TTE W/DOPPLER COMPLETE: CPT

## 2024-07-22 ENCOUNTER — APPOINTMENT (OUTPATIENT)
Dept: ULTRASOUND IMAGING | Facility: IMAGING CENTER | Age: 59
End: 2024-07-22
Payer: COMMERCIAL

## 2024-07-22 ENCOUNTER — NON-APPOINTMENT (OUTPATIENT)
Age: 59
End: 2024-07-22

## 2024-07-22 ENCOUNTER — APPOINTMENT (OUTPATIENT)
Dept: UROLOGY | Facility: CLINIC | Age: 59
End: 2024-07-22
Payer: COMMERCIAL

## 2024-07-22 VITALS — HEART RATE: 58 BPM | DIASTOLIC BLOOD PRESSURE: 69 MMHG | SYSTOLIC BLOOD PRESSURE: 108 MMHG

## 2024-07-22 DIAGNOSIS — N50.819 TESTICULAR PAIN, UNSPECIFIED: ICD-10-CM

## 2024-07-22 DIAGNOSIS — N45.1 EPIDIDYMITIS: ICD-10-CM

## 2024-07-22 DIAGNOSIS — C61 MALIGNANT NEOPLASM OF PROSTATE: ICD-10-CM

## 2024-07-22 PROCEDURE — 99213 OFFICE O/P EST LOW 20 MIN: CPT

## 2024-07-22 PROCEDURE — 93975 VASCULAR STUDY: CPT | Mod: 26

## 2024-07-22 PROCEDURE — G2211 COMPLEX E/M VISIT ADD ON: CPT

## 2024-07-22 RX ORDER — CIPROFLOXACIN HYDROCHLORIDE 500 MG/1
500 TABLET, FILM COATED ORAL
Qty: 14 | Refills: 0 | Status: ACTIVE | COMMUNITY
Start: 2024-07-22 | End: 1900-01-01

## 2024-07-22 NOTE — PHYSICAL EXAM
[Normal] : oriented to person, place and time, the affect was normal, the mood was normal and not anxious [de-identified] : pain upon palpation of right epidymitis

## 2024-07-22 NOTE — HISTORY OF PRESENT ILLNESS
[FreeTextEntry1] : aSmm Marie returns to the office today.  He is a 59-year-old man previously diagnosed with low-grade prostate cancer last year and has been advised to consider active surveillance which he has decided to pursue.  He is previously a patient of Dr. Wells and is now sees Dr. Chapin.  He has history of multiple myeloma.  His prostate biopsy was performed in late June 2023.  This was done after MRI of the prostate showed a very small PI-RADS category 4 lesion measuring 5 x 6 mm in largest dimensions.  Biopsies from that target area demonstrated small volume Kay 3+3 disease and there was 1 additional sample also showing small volume Bennett 3+3 prostate cancer.  Based on the MRI measurements, his prostate measured 67 cm.   His PSA level was 3.64ng/mL in March down from 5.84ng/mL in December.   He called the office today with 2-3 weeks of right scrotal pain. Denies any swelling. Denies any trauma. Denies any bothersome urinary symptoms.

## 2024-07-22 NOTE — DISEASE MANAGEMENT
[2] : T2 [a] : a [X] : MX [0-10] : 0 -10 ng/mL [Biopsy with Fusion] : Patient had a biopsy with fusion on [6] : Fusion Biopsy Kay Score: 6 [Biopsy results sent to PCP/Referring Physician] : Biopsy results sent to PCP/Referring Physician [] : Patient had a Prostate MRI [4] : 4 [I] : I [Active Surveillance] : Active Surveillance [BiopsyDate] : 6/27/23 [MeasuredProstateVolume] : 67 [TotalCores] : 13 [TotalPositiveCores] : 2 [MaxCoreInvolvement] : 10

## 2024-07-23 ENCOUNTER — APPOINTMENT (OUTPATIENT)
Dept: CARDIOLOGY | Facility: CLINIC | Age: 59
End: 2024-07-23
Payer: COMMERCIAL

## 2024-07-23 VITALS
TEMPERATURE: 97.7 F | DIASTOLIC BLOOD PRESSURE: 58 MMHG | OXYGEN SATURATION: 96 % | WEIGHT: 189 LBS | HEART RATE: 58 BPM | RESPIRATION RATE: 16 BRPM | BODY MASS INDEX: 27.06 KG/M2 | SYSTOLIC BLOOD PRESSURE: 97 MMHG | HEIGHT: 70 IN

## 2024-07-23 DIAGNOSIS — C90.01 MULTIPLE MYELOMA IN REMISSION: ICD-10-CM

## 2024-07-23 DIAGNOSIS — I50.22 CHRONIC SYSTOLIC (CONGESTIVE) HEART FAILURE: ICD-10-CM

## 2024-07-23 DIAGNOSIS — R55 SYNCOPE AND COLLAPSE: ICD-10-CM

## 2024-07-23 DIAGNOSIS — E78.5 HYPERLIPIDEMIA, UNSPECIFIED: ICD-10-CM

## 2024-07-23 LAB
ALBUMIN MFR SERPL ELPH: 68.3 %
ALBUMIN SERPL-MCNC: 4.4 G/DL
ALBUMIN/GLOB SERPL: 2.2 RATIO
ALPHA1 GLOB MFR SERPL ELPH: 3.8 %
ALPHA2 GLOB MFR SERPL ELPH: 9.6 %
ALPHA2 GLOB SERPL ELPH-MCNC: 0.6 G/DL
APPEARANCE: CLEAR
B-GLOBULIN MFR SERPL ELPH: 10.4 %
B-GLOBULIN SERPL ELPH-MCNC: 0.7 G/DL
BACTERIA UR CULT: NORMAL
BACTERIA: NEGATIVE /HPF
BILIRUBIN URINE: NEGATIVE
BLOOD URINE: NEGATIVE
CAST: 0 /LPF
COLOR: YELLOW
DEPRECATED KAPPA LC FREE/LAMBDA SER: 1.16 RATIO
EPITHELIAL CELLS: 0 /HPF
GAMMA GLOB FLD ELPH-MCNC: 0.5 G/DL
GAMMA GLOB MFR SERPL ELPH: 7.9 %
GLUCOSE QUALITATIVE U: NEGATIVE MG/DL
IGA SER QL IEP: 63 MG/DL
IGG SER QL IEP: 563 MG/DL
IGM SER QL IEP: 24 MG/DL
INTERPRETATION SERPL IEP-IMP: NORMAL
KAPPA LC CSF-MCNC: 0.31 MG/DL
KAPPA LC SERPL-MCNC: 0.36 MG/DL
KETONES URINE: NEGATIVE MG/DL
LEUKOCYTE ESTERASE URINE: NEGATIVE
M PROTEIN SPEC IFE-MCNC: NORMAL
MICROSCOPIC-UA: NORMAL
NITRITE URINE: NEGATIVE
PH URINE: 6
PROT SERPL-MCNC: 6.4 G/DL
PROT SERPL-MCNC: 6.4 G/DL
PROTEIN URINE: NEGATIVE MG/DL
PSA FREE FLD-MCNC: 14 %
PSA FREE SERPL-MCNC: 0.79 NG/ML
PSA SERPL-MCNC: 5.64 NG/ML
RED BLOOD CELLS URINE: 0 /HPF
SPECIFIC GRAVITY URINE: 1.02
UROBILINOGEN URINE: 0.2 MG/DL
WHITE BLOOD CELLS URINE: 0 /HPF

## 2024-07-23 PROCEDURE — G2211 COMPLEX E/M VISIT ADD ON: CPT

## 2024-07-23 PROCEDURE — 99214 OFFICE O/P EST MOD 30 MIN: CPT

## 2024-07-28 PROBLEM — R55 SYNCOPE, NEAR: Status: RESOLVED | Noted: 2024-07-15 | Resolved: 2024-07-28

## 2024-07-28 NOTE — CARDIOLOGY SUMMARY
[de-identified] : 8/14/23: SR, nonspecific t wave changes 9/16/22: NSR 3/22/2022: SR, nonspecific twave flattening [de-identified] : 3/4/20: SR, PVC 2.96% [de-identified] : 3/18/20: No ischemia. 12 minutes [de-identified] : 7/16/24: 1. Left ventricular systolic function is normal with an ejection fraction of 60 % by Guallpa's method of disks. 2. There is normal LV mass and concentric remodeling. 3. Normal left ventricular diastolic function. 4. Normal right ventricular cavity size, with normal wall thickness, and normal right ventricular systolic function. 5. The left atrium is normal in size. 6. An annuloplasty ring is noted in the mitral position. There is trace intravalvular mitral regurgitation. 7. Tricuspid valve repair. Trace tricuspid regurgitation. 8. Trileaflet aortic valve with normal systolic excursion.  8/11/23: 1. Normal left ventricular cavity size. Left ventricular wall thickness is normal. Left ventricular systolic function is normal with an ejection fraction of 60 % by Guallpa's method of disks. There are no regional wall motion abnormalities seen. 2. There is normal left ventricular diastolic function, with normal filling pressure. 3. Normal right ventricular cavity size, normal right ventricular wall thickness and normal right ventricular systolic function. 4. No significant valvular disease. 5. Normal atria. 6. No pericardial effusion seen. 7. Mild pulmonic regurgitation. 3/11/2021:  EF 40%, s/p MVR,TVR  9/14/22: EF 60%

## 2024-07-28 NOTE — CARDIOLOGY SUMMARY
[de-identified] : 8/14/23: SR, nonspecific t wave changes 9/16/22: NSR 3/22/2022: SR, nonspecific twave flattening [de-identified] : 3/4/20: SR, PVC 2.96% [de-identified] : 3/18/20: No ischemia. 12 minutes [de-identified] : 7/16/24: 1. Left ventricular systolic function is normal with an ejection fraction of 60 % by Guallpa's method of disks. 2. There is normal LV mass and concentric remodeling. 3. Normal left ventricular diastolic function. 4. Normal right ventricular cavity size, with normal wall thickness, and normal right ventricular systolic function. 5. The left atrium is normal in size. 6. An annuloplasty ring is noted in the mitral position. There is trace intravalvular mitral regurgitation. 7. Tricuspid valve repair. Trace tricuspid regurgitation. 8. Trileaflet aortic valve with normal systolic excursion.  8/11/23: 1. Normal left ventricular cavity size. Left ventricular wall thickness is normal. Left ventricular systolic function is normal with an ejection fraction of 60 % by Guallpa's method of disks. There are no regional wall motion abnormalities seen. 2. There is normal left ventricular diastolic function, with normal filling pressure. 3. Normal right ventricular cavity size, normal right ventricular wall thickness and normal right ventricular systolic function. 4. No significant valvular disease. 5. Normal atria. 6. No pericardial effusion seen. 7. Mild pulmonic regurgitation. 3/11/2021:  EF 40%, s/p MVR,TVR  9/14/22: EF 60%

## 2024-07-28 NOTE — DISCUSSION/SUMMARY
[FreeTextEntry1] : HFimpEF EF 60%, NYHA Class I hx of MV/TV repair MM, in remission, no longer on immunotherapy  -cont asa 81mg daily for presence of prosthetic material -IE ppx is indicated and was reviewed -cont metoprolol 50mg daily -intolerant to losartan in the past. continue valsartan 20mg daily as part of GDMT -no longer on immunotherapy. no need for surveillance TTE's in the absence of change in clinical status

## 2024-07-28 NOTE — HISTORY OF PRESENT ILLNESS
[FreeTextEntry1] : 59M hx of myeloma, MVP with ruptured chordae s/p MV/TV repair 11/30/2021, post-op Afib, HFimpEF EF 40%-->60% who presents for follow-up  MM 5/2019 in remission since 1/2021- previously on immunotherapy with ?cardiac complications. since then was previously on ninlaro and venetoclax  Feels well, swims regularly. Notes his resting HR is in the 60s No active cardiac sx tolerating his current GDMT have an episode of LOC 5/2024 in the setting of stopping ninlaro, subsequently decreased metoprolol succinate to 50mg daily  no recurrent sx no further dizziness which the pt was experiencing previously  5/24/24: Chol 156//HDL 52/LDL 85

## 2024-08-22 ENCOUNTER — APPOINTMENT (OUTPATIENT)
Dept: MRI IMAGING | Facility: CLINIC | Age: 59
End: 2024-08-22

## 2024-08-22 PROCEDURE — 72158 MRI LUMBAR SPINE W/O & W/DYE: CPT

## 2024-08-22 PROCEDURE — A9585: CPT | Mod: JW

## 2024-09-17 ENCOUNTER — APPOINTMENT (OUTPATIENT)
Dept: UROLOGY | Facility: CLINIC | Age: 59
End: 2024-09-17

## 2024-10-11 ENCOUNTER — APPOINTMENT (OUTPATIENT)
Dept: OTOLARYNGOLOGY | Facility: CLINIC | Age: 59
End: 2024-10-11

## 2024-10-16 ENCOUNTER — OUTPATIENT (OUTPATIENT)
Dept: OUTPATIENT SERVICES | Facility: HOSPITAL | Age: 59
LOS: 1 days | Discharge: HOME | End: 2024-10-16

## 2024-10-16 DIAGNOSIS — C90.00 MULTIPLE MYELOMA NOT HAVING ACHIEVED REMISSION: ICD-10-CM

## 2024-10-23 ENCOUNTER — RESULT REVIEW (OUTPATIENT)
Age: 59
End: 2024-10-23

## 2024-10-23 ENCOUNTER — APPOINTMENT (OUTPATIENT)
Dept: HEMATOLOGY ONCOLOGY | Facility: CLINIC | Age: 59
End: 2024-10-23
Payer: COMMERCIAL

## 2024-10-23 ENCOUNTER — NON-APPOINTMENT (OUTPATIENT)
Age: 59
End: 2024-10-23

## 2024-10-23 VITALS
DIASTOLIC BLOOD PRESSURE: 88 MMHG | OXYGEN SATURATION: 98 % | HEART RATE: 59 BPM | BODY MASS INDEX: 27.49 KG/M2 | RESPIRATION RATE: 16 BRPM | WEIGHT: 191.58 LBS | SYSTOLIC BLOOD PRESSURE: 131 MMHG | TEMPERATURE: 97.9 F

## 2024-10-23 DIAGNOSIS — C90.01 MULTIPLE MYELOMA IN REMISSION: ICD-10-CM

## 2024-10-23 LAB
BASOPHILS # BLD AUTO: 0.01 K/UL — SIGNIFICANT CHANGE UP (ref 0–0.2)
BASOPHILS NFR BLD AUTO: 0.2 % — SIGNIFICANT CHANGE UP (ref 0–2)
EOSINOPHIL # BLD AUTO: 0.04 K/UL — SIGNIFICANT CHANGE UP (ref 0–0.5)
EOSINOPHIL NFR BLD AUTO: 0.7 % — SIGNIFICANT CHANGE UP (ref 0–6)
HCT VFR BLD CALC: 42.1 % — SIGNIFICANT CHANGE UP (ref 39–50)
HGB BLD-MCNC: 14.3 G/DL — SIGNIFICANT CHANGE UP (ref 13–17)
IMM GRANULOCYTES NFR BLD AUTO: 0.4 % — SIGNIFICANT CHANGE UP (ref 0–0.9)
LYMPHOCYTES # BLD AUTO: 1.09 K/UL — SIGNIFICANT CHANGE UP (ref 1–3.3)
LYMPHOCYTES # BLD AUTO: 19.6 % — SIGNIFICANT CHANGE UP (ref 13–44)
MCHC RBC-ENTMCNC: 34 G/DL — SIGNIFICANT CHANGE UP (ref 32–36)
MCHC RBC-ENTMCNC: 34 PG — SIGNIFICANT CHANGE UP (ref 27–34)
MCV RBC AUTO: 100 FL — SIGNIFICANT CHANGE UP (ref 80–100)
MONOCYTES # BLD AUTO: 0.52 K/UL — SIGNIFICANT CHANGE UP (ref 0–0.9)
MONOCYTES NFR BLD AUTO: 9.3 % — SIGNIFICANT CHANGE UP (ref 2–14)
NEUTROPHILS # BLD AUTO: 3.89 K/UL — SIGNIFICANT CHANGE UP (ref 1.8–7.4)
NEUTROPHILS NFR BLD AUTO: 69.8 % — SIGNIFICANT CHANGE UP (ref 43–77)
NRBC # BLD: 0 /100 WBCS — SIGNIFICANT CHANGE UP (ref 0–0)
PLATELET # BLD AUTO: 180 K/UL — SIGNIFICANT CHANGE UP (ref 150–400)
RBC # BLD: 4.21 M/UL — SIGNIFICANT CHANGE UP (ref 4.2–5.8)
RBC # FLD: 11.6 % — SIGNIFICANT CHANGE UP (ref 10.3–14.5)
WBC # BLD: 5.57 K/UL — SIGNIFICANT CHANGE UP (ref 3.8–10.5)
WBC # FLD AUTO: 5.57 K/UL — SIGNIFICANT CHANGE UP (ref 3.8–10.5)

## 2024-10-23 PROCEDURE — G2211 COMPLEX E/M VISIT ADD ON: CPT

## 2024-10-23 PROCEDURE — 99213 OFFICE O/P EST LOW 20 MIN: CPT

## 2024-10-25 LAB
ALBUMIN SERPL ELPH-MCNC: 4.5 G/DL
ALP BLD-CCNC: 114 U/L
ALT SERPL-CCNC: 34 U/L
ANION GAP SERPL CALC-SCNC: 18 MMOL/L
AST SERPL-CCNC: 47 U/L
B2 MICROGLOB SERPL-MCNC: 1.5 MG/L
BILIRUB SERPL-MCNC: 2.6 MG/DL
BUN SERPL-MCNC: 13 MG/DL
CALCIUM SERPL-MCNC: 9.5 MG/DL
CHLORIDE SERPL-SCNC: 102 MMOL/L
CO2 SERPL-SCNC: 23 MMOL/L
CREAT SERPL-MCNC: 1.01 MG/DL
EGFR: 85 ML/MIN/1.73M2
GLUCOSE SERPL-MCNC: 77 MG/DL
POTASSIUM SERPL-SCNC: 5 MMOL/L
PROT SERPL-MCNC: 6.3 G/DL
SODIUM SERPL-SCNC: 143 MMOL/L

## 2024-10-31 LAB
ALBUMIN MFR SERPL ELPH: 67.8 %
ALBUMIN SERPL-MCNC: 4.3 G/DL
ALBUMIN/GLOB SERPL: 2.2 RATIO
ALPHA1 GLOB MFR SERPL ELPH: 4 %
ALPHA1 GLOB SERPL ELPH-MCNC: 0.3 G/DL
ALPHA2 GLOB MFR SERPL ELPH: 9.5 %
ALPHA2 GLOB SERPL ELPH-MCNC: 0.6 G/DL
B-GLOBULIN MFR SERPL ELPH: 10.4 %
B-GLOBULIN SERPL ELPH-MCNC: 0.7 G/DL
DEPRECATED KAPPA LC FREE/LAMBDA SER: 1.59 RATIO
GAMMA GLOB FLD ELPH-MCNC: 0.5 G/DL
GAMMA GLOB MFR SERPL ELPH: 8.3 %
IGA SER QL IEP: 77 MG/DL
IGG SER QL IEP: 568 MG/DL
IGM SER QL IEP: 29 MG/DL
INTERPRETATION SERPL IEP-IMP: NORMAL
KAPPA LC CSF-MCNC: 0.34 MG/DL
KAPPA LC SERPL-MCNC: 0.54 MG/DL
M PROTEIN SPEC IFE-MCNC: NORMAL
PROT SERPL-MCNC: 6.3 G/DL
PROT SERPL-MCNC: 6.3 G/DL

## 2025-01-06 ENCOUNTER — RESULT REVIEW (OUTPATIENT)
Age: 60
End: 2025-01-06

## 2025-01-06 ENCOUNTER — APPOINTMENT (OUTPATIENT)
Dept: MRI IMAGING | Facility: CLINIC | Age: 60
End: 2025-01-06
Payer: COMMERCIAL

## 2025-01-06 PROCEDURE — 76498P: CUSTOM

## 2025-01-06 PROCEDURE — 72197 MRI PELVIS W/O & W/DYE: CPT

## 2025-01-06 PROCEDURE — A9585: CPT | Mod: JZ

## 2025-01-10 DIAGNOSIS — C90.01 MULTIPLE MYELOMA IN REMISSION: ICD-10-CM

## 2025-01-11 PROBLEM — N40.1 BENIGN LOCALIZED HYPERPLASIA OF PROSTATE WITH URINARY OBSTRUCTION: Status: ACTIVE | Noted: 2025-01-11

## 2025-01-16 ENCOUNTER — NON-APPOINTMENT (OUTPATIENT)
Age: 60
End: 2025-01-16

## 2025-01-16 ENCOUNTER — APPOINTMENT (OUTPATIENT)
Dept: UROLOGY | Facility: CLINIC | Age: 60
End: 2025-01-16
Payer: COMMERCIAL

## 2025-01-16 VITALS
DIASTOLIC BLOOD PRESSURE: 76 MMHG | WEIGHT: 194 LBS | SYSTOLIC BLOOD PRESSURE: 113 MMHG | BODY MASS INDEX: 27.77 KG/M2 | HEART RATE: 64 BPM | OXYGEN SATURATION: 94 % | HEIGHT: 70 IN

## 2025-01-16 DIAGNOSIS — N40.1 BENIGN PROSTATIC HYPERPLASIA WITH LOWER URINARY TRACT SYMPMS: ICD-10-CM

## 2025-01-16 DIAGNOSIS — N13.8 BENIGN PROSTATIC HYPERPLASIA WITH LOWER URINARY TRACT SYMPMS: ICD-10-CM

## 2025-01-16 DIAGNOSIS — C61 MALIGNANT NEOPLASM OF PROSTATE: ICD-10-CM

## 2025-01-16 PROCEDURE — G2211 COMPLEX E/M VISIT ADD ON: CPT

## 2025-01-16 PROCEDURE — 99214 OFFICE O/P EST MOD 30 MIN: CPT

## 2025-01-16 RX ORDER — TAMSULOSIN HYDROCHLORIDE 0.4 MG/1
0.4 CAPSULE ORAL
Qty: 90 | Refills: 3 | Status: ACTIVE | COMMUNITY
Start: 2025-01-16 | End: 1900-01-01

## 2025-01-18 ENCOUNTER — NON-APPOINTMENT (OUTPATIENT)
Age: 60
End: 2025-01-18

## 2025-01-18 LAB
PSA FREE FLD-MCNC: 15 %
PSA FREE SERPL-MCNC: 0.72 NG/ML
PSA SERPL-MCNC: 4.63 NG/ML

## 2025-01-22 ENCOUNTER — NON-APPOINTMENT (OUTPATIENT)
Age: 60
End: 2025-01-22

## 2025-01-22 ENCOUNTER — APPOINTMENT (OUTPATIENT)
Dept: HEMATOLOGY ONCOLOGY | Facility: CLINIC | Age: 60
End: 2025-01-22
Payer: COMMERCIAL

## 2025-01-22 ENCOUNTER — RESULT REVIEW (OUTPATIENT)
Age: 60
End: 2025-01-22

## 2025-01-22 VITALS
HEART RATE: 61 BPM | BODY MASS INDEX: 28.19 KG/M2 | DIASTOLIC BLOOD PRESSURE: 86 MMHG | RESPIRATION RATE: 16 BRPM | TEMPERATURE: 99 F | OXYGEN SATURATION: 96 % | WEIGHT: 196.43 LBS | SYSTOLIC BLOOD PRESSURE: 137 MMHG

## 2025-01-22 DIAGNOSIS — C90.01 MULTIPLE MYELOMA IN REMISSION: ICD-10-CM

## 2025-01-22 LAB
ALBUMIN SERPL ELPH-MCNC: 4.8 G/DL
ALP BLD-CCNC: 114 U/L
ALT SERPL-CCNC: 24 U/L
ANION GAP SERPL CALC-SCNC: 10 MMOL/L
AST SERPL-CCNC: 30 U/L
B2 MICROGLOB SERPL-MCNC: 1.5 MG/L
BILIRUB SERPL-MCNC: 2.1 MG/DL
BUN SERPL-MCNC: 13 MG/DL
CALCIUM SERPL-MCNC: 9.7 MG/DL
CHLORIDE SERPL-SCNC: 103 MMOL/L
CO2 SERPL-SCNC: 29 MMOL/L
CREAT SERPL-MCNC: 1.01 MG/DL
EGFR: 85 ML/MIN/1.73M2
GLUCOSE SERPL-MCNC: 97 MG/DL
LDH SERPL-CCNC: 212 U/L
POTASSIUM SERPL-SCNC: 4.7 MMOL/L
PROT SERPL-MCNC: 6.6 G/DL
SODIUM SERPL-SCNC: 142 MMOL/L

## 2025-01-22 PROCEDURE — 99213 OFFICE O/P EST LOW 20 MIN: CPT

## 2025-01-22 PROCEDURE — G2211 COMPLEX E/M VISIT ADD ON: CPT

## 2025-01-22 RX ORDER — ALPRAZOLAM 0.25 MG/1
0.25 TABLET ORAL
Refills: 0 | Status: ACTIVE | COMMUNITY
Start: 2025-01-22

## 2025-01-22 RX ORDER — CHLORHEXIDINE GLUCONATE 4 %
LIQUID (ML) TOPICAL DAILY
Refills: 0 | Status: ACTIVE | COMMUNITY
Start: 2025-01-22

## 2025-01-23 ENCOUNTER — RX RENEWAL (OUTPATIENT)
Age: 60
End: 2025-01-23

## 2025-01-24 LAB
ALBUMIN MFR SERPL ELPH: 68.2 %
ALBUMIN SERPL-MCNC: 4.5 G/DL
ALBUMIN/GLOB SERPL: 2.1 RATIO
ALPHA1 GLOB MFR SERPL ELPH: 3.7 %
ALPHA1 GLOB SERPL ELPH-MCNC: 0.2 G/DL
ALPHA2 GLOB MFR SERPL ELPH: 8.8 %
ALPHA2 GLOB SERPL ELPH-MCNC: 0.6 G/DL
B-GLOBULIN MFR SERPL ELPH: 10.5 %
B-GLOBULIN SERPL ELPH-MCNC: 0.7 G/DL
DEPRECATED KAPPA LC FREE/LAMBDA SER: 1.43 RATIO
GAMMA GLOB FLD ELPH-MCNC: 0.6 G/DL
GAMMA GLOB MFR SERPL ELPH: 8.8 %
IGA SER QL IEP: 86 MG/DL
IGG SER QL IEP: 633 MG/DL
IGM SER QL IEP: 35 MG/DL
INTERPRETATION SERPL IEP-IMP: NORMAL
KAPPA LC CSF-MCNC: 0.37 MG/DL
KAPPA LC SERPL-MCNC: 0.53 MG/DL
M PROTEIN SPEC IFE-MCNC: NORMAL
PROT SERPL-MCNC: 6.6 G/DL
PROT SERPL-MCNC: 6.6 G/DL

## 2025-01-30 ENCOUNTER — APPOINTMENT (OUTPATIENT)
Dept: CARDIOLOGY | Facility: CLINIC | Age: 60
End: 2025-01-30
Payer: COMMERCIAL

## 2025-01-30 VITALS
HEART RATE: 62 BPM | TEMPERATURE: 97.9 F | HEIGHT: 70 IN | BODY MASS INDEX: 27.63 KG/M2 | OXYGEN SATURATION: 96 % | DIASTOLIC BLOOD PRESSURE: 84 MMHG | WEIGHT: 193 LBS | SYSTOLIC BLOOD PRESSURE: 125 MMHG

## 2025-01-30 DIAGNOSIS — R07.89 OTHER CHEST PAIN: ICD-10-CM

## 2025-01-30 DIAGNOSIS — E78.5 HYPERLIPIDEMIA, UNSPECIFIED: ICD-10-CM

## 2025-01-30 DIAGNOSIS — I50.22 CHRONIC SYSTOLIC (CONGESTIVE) HEART FAILURE: ICD-10-CM

## 2025-01-30 PROCEDURE — G2211 COMPLEX E/M VISIT ADD ON: CPT

## 2025-01-30 PROCEDURE — 99214 OFFICE O/P EST MOD 30 MIN: CPT

## 2025-01-30 RX ORDER — AMOXICILLIN 500 MG/1
500 TABLET, FILM COATED ORAL
Qty: 4 | Refills: 2 | Status: ACTIVE | COMMUNITY
Start: 2025-01-30 | End: 1900-01-01

## 2025-02-19 ENCOUNTER — RX RENEWAL (OUTPATIENT)
Age: 60
End: 2025-02-19